# Patient Record
Sex: FEMALE | Race: WHITE | NOT HISPANIC OR LATINO | Employment: OTHER | URBAN - METROPOLITAN AREA
[De-identification: names, ages, dates, MRNs, and addresses within clinical notes are randomized per-mention and may not be internally consistent; named-entity substitution may affect disease eponyms.]

---

## 2020-03-18 ENCOUNTER — TELEPHONE (OUTPATIENT)
Dept: GASTROENTEROLOGY | Facility: CLINIC | Age: 70
End: 2020-03-18

## 2020-03-18 NOTE — TELEPHONE ENCOUNTER
Spoke with pt  She has decided to postpone her colonoscopy  She asks for a call to reschedule when we are able to do so

## 2020-04-29 ENCOUNTER — TELEPHONE (OUTPATIENT)
Dept: GASTROENTEROLOGY | Facility: CLINIC | Age: 70
End: 2020-04-29

## 2020-05-06 ENCOUNTER — TELEPHONE (OUTPATIENT)
Dept: GASTROENTEROLOGY | Facility: CLINIC | Age: 70
End: 2020-05-06

## 2020-05-06 ENCOUNTER — OFFICE VISIT (OUTPATIENT)
Dept: GASTROENTEROLOGY | Facility: CLINIC | Age: 70
End: 2020-05-06
Payer: COMMERCIAL

## 2020-05-06 VITALS
HEART RATE: 92 BPM | SYSTOLIC BLOOD PRESSURE: 120 MMHG | DIASTOLIC BLOOD PRESSURE: 82 MMHG | HEIGHT: 64 IN | WEIGHT: 151 LBS | BODY MASS INDEX: 25.78 KG/M2

## 2020-05-06 DIAGNOSIS — K21.9 GASTROESOPHAGEAL REFLUX DISEASE WITHOUT ESOPHAGITIS: ICD-10-CM

## 2020-05-06 DIAGNOSIS — R19.4 CHANGE IN BOWEL HABITS: Primary | ICD-10-CM

## 2020-05-06 DIAGNOSIS — Z12.11 COLON CANCER SCREENING: ICD-10-CM

## 2020-05-06 PROCEDURE — 99204 OFFICE O/P NEW MOD 45 MIN: CPT | Performed by: INTERNAL MEDICINE

## 2020-05-06 RX ORDER — ASCORBIC ACID 500 MG
500 TABLET ORAL DAILY
COMMUNITY

## 2020-05-06 RX ORDER — ALBUTEROL SULFATE 90 UG/1
2 AEROSOL, METERED RESPIRATORY (INHALATION) 4 TIMES DAILY
COMMUNITY

## 2020-05-06 RX ORDER — LOSARTAN POTASSIUM 50 MG/1
25 TABLET ORAL DAILY
COMMUNITY
Start: 2020-04-15 | End: 2022-03-09

## 2020-05-06 RX ORDER — IPRATROPIUM BROMIDE AND ALBUTEROL SULFATE .5; 3 MG/3ML; MG/3ML
3 SOLUTION RESPIRATORY (INHALATION) AS NEEDED
COMMUNITY

## 2020-05-06 RX ORDER — BUDESONIDE 0.5 MG/2ML
0.5 INHALANT ORAL DAILY PRN
COMMUNITY

## 2020-05-06 RX ORDER — MELATONIN
1000 DAILY
COMMUNITY

## 2020-05-06 RX ORDER — HYDROCHLOROTHIAZIDE 25 MG/1
25 TABLET ORAL DAILY
COMMUNITY
End: 2022-04-21 | Stop reason: SDUPTHER

## 2020-05-06 RX ORDER — VENLAFAXINE HYDROCHLORIDE 75 MG/1
75 CAPSULE, EXTENDED RELEASE ORAL DAILY
COMMUNITY
Start: 2020-04-07

## 2020-05-06 RX ORDER — LEVOTHYROXINE SODIUM 75 MCG
75 TABLET ORAL
COMMUNITY
Start: 2020-04-30

## 2020-05-06 RX ORDER — ROSUVASTATIN CALCIUM 20 MG/1
20 TABLET, COATED ORAL DAILY
COMMUNITY
Start: 2020-03-22

## 2020-05-06 RX ORDER — ALPRAZOLAM 0.25 MG/1
TABLET ORAL 4 TIMES DAILY PRN
COMMUNITY
End: 2022-03-09

## 2020-05-06 RX ORDER — SEMAGLUTIDE 1.34 MG/ML
INJECTION, SOLUTION SUBCUTANEOUS
COMMUNITY
Start: 2020-02-20

## 2020-05-06 RX ORDER — PHENOL 1.4 %
600 AEROSOL, SPRAY (ML) MUCOUS MEMBRANE 2 TIMES DAILY WITH MEALS
COMMUNITY

## 2020-05-06 RX ORDER — FLUTICASONE PROPIONATE 50 MCG
1 SPRAY, SUSPENSION (ML) NASAL DAILY
COMMUNITY
End: 2021-03-04

## 2020-05-06 RX ORDER — POTASSIUM CHLORIDE 20 MEQ/1
20 TABLET, EXTENDED RELEASE ORAL DAILY
COMMUNITY
Start: 2020-05-01

## 2020-05-06 RX ORDER — ASPIRIN 81 MG/1
81 TABLET ORAL DAILY
COMMUNITY
End: 2021-03-04 | Stop reason: ALTCHOICE

## 2020-05-06 RX ORDER — METFORMIN HYDROCHLORIDE 500 MG/1
1000 TABLET, EXTENDED RELEASE ORAL DAILY
COMMUNITY
Start: 2020-04-19

## 2020-05-06 RX ORDER — BENZONATATE 100 MG/1
100 CAPSULE ORAL 3 TIMES DAILY PRN
COMMUNITY

## 2020-05-06 RX ORDER — NITROGLYCERIN 0.4 MG/1
TABLET SUBLINGUAL
COMMUNITY
Start: 2020-03-29

## 2020-05-06 RX ORDER — AMLODIPINE BESYLATE 5 MG/1
5 TABLET ORAL DAILY
COMMUNITY
Start: 2020-04-07

## 2020-05-13 LAB
C DIFF TOX GENS STL QL NAA+PROBE: NEGATIVE
CALPROTECTIN STL-MCNT: 103 UG/G (ref 0–120)
FAT STL QL: NORMAL
G LAMBLIA AG STL QL IA: NEGATIVE
Lab: NORMAL
NEUTRAL FAT STL QL: NORMAL
WBC SPEC QL GRAM STN: NORMAL

## 2020-05-15 DIAGNOSIS — Z20.822 ENCOUNTER FOR LABORATORY TESTING FOR COVID-19 VIRUS: ICD-10-CM

## 2020-05-15 PROCEDURE — U0003 INFECTIOUS AGENT DETECTION BY NUCLEIC ACID (DNA OR RNA); SEVERE ACUTE RESPIRATORY SYNDROME CORONAVIRUS 2 (SARS-COV-2) (CORONAVIRUS DISEASE [COVID-19]), AMPLIFIED PROBE TECHNIQUE, MAKING USE OF HIGH THROUGHPUT TECHNOLOGIES AS DESCRIBED BY CMS-2020-01-R: HCPCS

## 2020-05-16 LAB — SARS-COV-2 RNA SPEC QL NAA+PROBE: NOT DETECTED

## 2020-05-21 ENCOUNTER — OFFICE VISIT (OUTPATIENT)
Dept: URGENT CARE | Facility: CLINIC | Age: 70
End: 2020-05-21
Payer: COMMERCIAL

## 2020-05-21 VITALS
RESPIRATION RATE: 16 BRPM | DIASTOLIC BLOOD PRESSURE: 80 MMHG | TEMPERATURE: 98.2 F | BODY MASS INDEX: 26.87 KG/M2 | HEIGHT: 64 IN | SYSTOLIC BLOOD PRESSURE: 138 MMHG | WEIGHT: 157.4 LBS | OXYGEN SATURATION: 95 % | HEART RATE: 93 BPM

## 2020-05-21 DIAGNOSIS — H66.002 NON-RECURRENT ACUTE SUPPURATIVE OTITIS MEDIA OF LEFT EAR WITHOUT SPONTANEOUS RUPTURE OF TYMPANIC MEMBRANE: Primary | ICD-10-CM

## 2020-05-21 PROCEDURE — 99213 OFFICE O/P EST LOW 20 MIN: CPT | Performed by: FAMILY MEDICINE

## 2020-05-21 RX ORDER — AMOXICILLIN 500 MG/1
500 CAPSULE ORAL EVERY 8 HOURS SCHEDULED
Qty: 30 CAPSULE | Refills: 0 | Status: SHIPPED | OUTPATIENT
Start: 2020-05-21 | End: 2020-05-31

## 2020-05-22 ENCOUNTER — ANESTHESIA EVENT (OUTPATIENT)
Dept: GASTROENTEROLOGY | Facility: AMBULATORY SURGERY CENTER | Age: 70
End: 2020-05-22

## 2020-05-22 ENCOUNTER — ANESTHESIA (OUTPATIENT)
Dept: GASTROENTEROLOGY | Facility: AMBULATORY SURGERY CENTER | Age: 70
End: 2020-05-22

## 2020-05-22 ENCOUNTER — TELEPHONE (OUTPATIENT)
Dept: GASTROENTEROLOGY | Facility: CLINIC | Age: 70
End: 2020-05-22

## 2020-05-22 ENCOUNTER — HOSPITAL ENCOUNTER (OUTPATIENT)
Dept: GASTROENTEROLOGY | Facility: AMBULATORY SURGERY CENTER | Age: 70
Discharge: HOME | End: 2020-05-22
Payer: COMMERCIAL

## 2020-05-22 VITALS
SYSTOLIC BLOOD PRESSURE: 151 MMHG | TEMPERATURE: 97.7 F | RESPIRATION RATE: 14 BRPM | HEART RATE: 75 BPM | OXYGEN SATURATION: 97 % | DIASTOLIC BLOOD PRESSURE: 76 MMHG | HEIGHT: 64 IN | BODY MASS INDEX: 25.95 KG/M2 | WEIGHT: 152 LBS

## 2020-05-22 DIAGNOSIS — K21.9 GASTROESOPHAGEAL REFLUX DISEASE WITHOUT ESOPHAGITIS: Primary | ICD-10-CM

## 2020-05-22 DIAGNOSIS — K21.9 GASTROESOPHAGEAL REFLUX DISEASE WITHOUT ESOPHAGITIS: ICD-10-CM

## 2020-05-22 PROCEDURE — 43450 DILATE ESOPHAGUS 1/MULT PASS: CPT | Performed by: INTERNAL MEDICINE

## 2020-05-22 PROCEDURE — 43239 EGD BIOPSY SINGLE/MULTIPLE: CPT | Performed by: INTERNAL MEDICINE

## 2020-05-22 RX ORDER — SODIUM CHLORIDE 9 MG/ML
50 INJECTION, SOLUTION INTRAVENOUS CONTINUOUS
Status: DISCONTINUED | OUTPATIENT
Start: 2020-05-22 | End: 2020-05-26 | Stop reason: HOSPADM

## 2020-05-22 RX ORDER — PROPOFOL 10 MG/ML
INJECTION, EMULSION INTRAVENOUS AS NEEDED
Status: DISCONTINUED | OUTPATIENT
Start: 2020-05-22 | End: 2020-05-22 | Stop reason: SURG

## 2020-05-22 RX ADMIN — SODIUM CHLORIDE: 9 INJECTION, SOLUTION INTRAVENOUS at 09:40

## 2020-05-22 RX ADMIN — SODIUM CHLORIDE 50 ML/HR: 9 INJECTION, SOLUTION INTRAVENOUS at 09:31

## 2020-05-22 RX ADMIN — PROPOFOL 300 MG: 10 INJECTION, EMULSION INTRAVENOUS at 09:54

## 2020-05-26 ENCOUNTER — TELEPHONE (OUTPATIENT)
Dept: GASTROENTEROLOGY | Facility: CLINIC | Age: 70
End: 2020-05-26

## 2020-07-01 ENCOUNTER — OFFICE VISIT (OUTPATIENT)
Dept: GASTROENTEROLOGY | Facility: CLINIC | Age: 70
End: 2020-07-01
Payer: COMMERCIAL

## 2020-07-01 VITALS
BODY MASS INDEX: 26.12 KG/M2 | TEMPERATURE: 98.1 F | SYSTOLIC BLOOD PRESSURE: 122 MMHG | WEIGHT: 153 LBS | HEIGHT: 64 IN | DIASTOLIC BLOOD PRESSURE: 88 MMHG

## 2020-07-01 DIAGNOSIS — Z12.11 COLON CANCER SCREENING: ICD-10-CM

## 2020-07-01 DIAGNOSIS — R19.4 CHANGE IN BOWEL HABITS: Primary | ICD-10-CM

## 2020-07-01 DIAGNOSIS — K62.5 RECTAL BLEEDING: ICD-10-CM

## 2020-07-01 DIAGNOSIS — K21.9 GASTROESOPHAGEAL REFLUX DISEASE WITHOUT ESOPHAGITIS: ICD-10-CM

## 2020-07-01 PROCEDURE — 99214 OFFICE O/P EST MOD 30 MIN: CPT | Performed by: INTERNAL MEDICINE

## 2020-07-01 NOTE — LETTER
July 1, 2020     Sohail Araya MD  [address]  [address]  [address] [place] [address]    Patient: Yola Rasheed   YOB: 1950   Date of Visit: 7/1/2020       Dear Dr Massey Scales:    Thank you for referring Yola Rasheed to me for evaluation  Below are my notes for this consultation  If you have questions, please do not hesitate to call me  I look forward to following your patient along with you  Sincerely,        Jerry Ponce MD        CC: No Recipients  Jerry Ponce MD  7/1/2020  2:11 PM  Sign at close encounter  [facility] - Outpatient Follow-up Note  Yola Rasheed 78 y o  female MRN: [id number]  Encounter: [id number]    ASSESSMENT AND PLAN:      1  Change in bowel habits  2  Rectal bleeding  Suspect irritable bowel syndrome exacerbated by change in diet with bleeding related to hemorrhoids  Stool studies negative  No improvement with fiber  Having 3 loose/soft bowel movements in the morning   - continue fiber supplementation  -  Colonoscopy at [facility] [place]  - T/C trial of cholestyramine    3  Gastroesophageal reflux disease without esophagitis  Much improved with Dexilant after EGD and Sharp dilatation  - continue Dexilant    4  Colon cancer screening  Last colonoscopy September 2011      Followup Appointment:  After colonoscopy  ______________________________________________________________________    Chief Complaint   Patient presents with    Follow-up     HPI:  The patient returns today for follow-up  She was evaluated in the spring related to a change in bowel habit which was probably related to alterations in her diet when she moved in with her son and daughter in law  She was started on fiber supplementation  Stool studies were negative  She reports she still moves her bowels about 2 or 3 times a day in the morning  They are loose    She is seeing significant amount of rectal bleeding about once every other month which she attributes to her hemorrhoids  She denies any abdominal pain  She denies any anal pain or burning  Her weight is stable  From a GERD standpoint she continues take Dexilant once a day  She had an EGD with biopsies and a Sharp dilatation which improved her symptoms  Historical Information   Past Medical History:   Diagnosis Date    Colon cancer screening     Last colonoscopy September 2011    Due for follow-up 2021    Diabetes mellitus ([address] )     Disease of thyroid gland     Fatty liver disease, nonalcoholic     GERD (gastroesophageal reflux disease)     Last EGD 2016    History of pulmonary aspergillosis 2012    Hx of migraine headaches     Hypertension     Reactive airway disease     Restless leg syndrome      Past Surgical History:   Procedure Laterality Date    CORNEA LACERATION REPAIR      TONSILLECTOMY      WISDOM TOOTH EXTRACTION       Social History     Substance and Sexual Activity   Alcohol Use Not on file     Social History     Substance and Sexual Activity   Drug Use Not Currently     Social History     Tobacco Use   Smoking Status Former Smoker   Smokeless Tobacco Never Used     Family History   Problem Relation Age of Onset    Heart disease Mother     Heart disease Father     Colon polyps Neg Hx     Colon cancer Neg Hx          Current Outpatient Medications:     albuterol (Proventil HFA) 90 mcg/act inhaler    ALPRAZolam (XANAX) 0 25 mg tablet    amLODIPine (NORVASC) 5 mg tablet    ascorbic acid (VITAMIN C) 500 mg tablet    aspirin (ECOTRIN LOW STRENGTH) 81 mg EC tablet    beclomethasone (QVAR) 40 MCG/ACT inhaler    benzonatate (TESSALON PERLES) 100 mg capsule    budesonide (PULMICORT) 0 5 mg/2 mL nebulizer solution    calcium carbonate (OS-BRITTA) 600 MG tablet    cholecalciferol (VITAMIN D3) 1,000 units tablet    DEXILANT 60 MG capsule    fluticasone (FLONASE) 50 mcg/act nasal spray    GUAIFENESIN 1200 PO    hydrochlorothiazide (HYDRODIURIL) 25 mg tablet   ipratropium-albuterol, FOR EMS ONLY, (DUO-NEB) 0 5-2 5 mg/3 mL nebulizer solution    losartan (COZAAR) 50 mg tablet    metFORMIN (GLUCOPHAGE-XR) 500 mg 24 hr tablet    nitroglycerin (NITROSTAT) 0 4 mg SL tablet    OZEMPIC, 0 25 OR 0 5 MG/DOSE, 2 MG/1 5ML SOPN    potassium chloride (K-DUR,KLOR-CON) 20 mEq tablet    rosuvastatin (CRESTOR) 20 MG tablet    SYNTHROID 75 MCG tablet    venlafaxine (EFFEXOR-XR) 75 mg 24 hr capsule  Allergies   Allergen Reactions    Epinephrine Shortness Of Breath and Palpitations    Irbesartan-Hydrochlorothiazide      Possible lichen planus    Lisinopril      Lichen planus    Tizanidine      Altered mental status     Reviewed medications and allergies and updated as indicated    PHYSICAL EXAM:    Blood pressure 122/88, temperature 98 1 °F (36 7 °C), height 5' 4" (1 626 m), weight 69 4 kg (153 lb)  Body mass index is 26 26 kg/m²  General Appearance: NAD, cooperative, alert  Eyes: Anicteric, PERRLA, EOMI  ENT:  Normocephalic, atraumatic, normal mucosa  Neck:  Supple, symmetrical, trachea midline  Resp:  Clear to auscultation bilaterally; no rales, rhonchi or wheezing; respirations unlabored   CV:  S1 S2, Regular rate and rhythm; no murmur, rub, or gallop  GI:  Soft, non-tender, non-distended; normal bowel sounds; no masses, no organomegaly   Rectal: Deferred  Musculoskeletal: No cyanosis, clubbing or edema  Normal ROM  Skin:  No jaundice, rashes, or lesions   Heme/Lymph: No palpable cervical lymphadenopathy  Psych: Normal affect, good eye contact  Neuro: No gross deficits, AAOx3    Lab Results:   None recently    Radiology Results:   No results found

## 2020-07-01 NOTE — PROGRESS NOTES
[address] [facility] - Outpatient Follow-up Note  Hanna Me 78 y o  female MRN: [id number]  Encounter: [id number]    ASSESSMENT AND PLAN:      1  Change in bowel habits  2  Rectal bleeding  Suspect irritable bowel syndrome exacerbated by change in diet with bleeding related to hemorrhoids  Stool studies negative  No improvement with fiber  Having 3 loose/soft bowel movements in the morning   - continue fiber supplementation  -  Colonoscopy at [facility]  - T/C trial of cholestyramine    3  Gastroesophageal reflux disease without esophagitis  Much improved with Dexilant after EGD and Sharp dilatation  - continue Dexilant    4  Colon cancer screening  Last colonoscopy September 2011      Followup Appointment:  After colonoscopy  ______________________________________________________________________    Chief Complaint   Patient presents with    Follow-up     HPI:  The patient returns today for follow-up  She was evaluated in the spring related to a change in bowel habit which was probably related to alterations in her diet when she moved in with her son and daughter in law  She was started on fiber supplementation  Stool studies were negative  She reports she still moves her bowels about 2 or 3 times a day in the morning  They are loose  She is seeing significant amount of rectal bleeding about once every other month which she attributes to her hemorrhoids  She denies any abdominal pain  She denies any anal pain or burning  Her weight is stable  From a GERD standpoint she continues take Dexilant once a day  She had an EGD with biopsies and a Sharp dilatation which improved her symptoms  Historical Information   Past Medical History:   Diagnosis Date    Colon cancer screening     Last colonoscopy September 2011    Due for follow-up 2021    Diabetes mellitus ([address] )     Disease of thyroid gland     Fatty liver disease, nonalcoholic     GERD (gastroesophageal reflux disease)     Last EGD 2016    History of pulmonary aspergillosis 2012    Hx of migraine headaches     Hypertension     Reactive airway disease     Restless leg syndrome      Past Surgical History:   Procedure Laterality Date    CORNEA LACERATION REPAIR      TONSILLECTOMY      WISDOM TOOTH EXTRACTION       Social History     Substance and Sexual Activity   Alcohol Use Not on file     Social History     Substance and Sexual Activity   Drug Use Not Currently     Social History     Tobacco Use   Smoking Status Former Smoker   Smokeless Tobacco Never Used     Family History   Problem Relation Age of Onset    Heart disease Mother     Heart disease Father     Colon polyps Neg Hx     Colon cancer Neg Hx          Current Outpatient Medications:     albuterol (Proventil HFA) 90 mcg/act inhaler    ALPRAZolam (XANAX) 0 25 mg tablet    amLODIPine (NORVASC) 5 mg tablet    ascorbic acid (VITAMIN C) 500 mg tablet    aspirin (ECOTRIN LOW STRENGTH) 81 mg EC tablet    beclomethasone (QVAR) 40 MCG/ACT inhaler    benzonatate (TESSALON PERLES) 100 mg capsule    budesonide (PULMICORT) 0 5 mg/2 mL nebulizer solution    calcium carbonate (OS-BRITTA) 600 MG tablet    cholecalciferol (VITAMIN D3) 1,000 units tablet    DEXILANT 60 MG capsule    fluticasone (FLONASE) 50 mcg/act nasal spray    GUAIFENESIN 1200 PO    hydrochlorothiazide (HYDRODIURIL) 25 mg tablet    ipratropium-albuterol, FOR EMS ONLY, (DUO-NEB) 0 5-2 5 mg/3 mL nebulizer solution    losartan (COZAAR) 50 mg tablet    metFORMIN (GLUCOPHAGE-XR) 500 mg 24 hr tablet    nitroglycerin (NITROSTAT) 0 4 mg SL tablet    OZEMPIC, 0 25 OR 0 5 MG/DOSE, 2 MG/1 5ML SOPN    potassium chloride (K-DUR,KLOR-CON) 20 mEq tablet    rosuvastatin (CRESTOR) 20 MG tablet    SYNTHROID 75 MCG tablet    venlafaxine (EFFEXOR-XR) 75 mg 24 hr capsule  Allergies   Allergen Reactions    Epinephrine Shortness Of Breath and Palpitations    Irbesartan-Hydrochlorothiazide Possible lichen planus    Lisinopril      Lichen planus    Tizanidine      Altered mental status     Reviewed medications and allergies and updated as indicated    PHYSICAL EXAM:    Blood pressure 122/88, temperature 98 1 °F (36 7 °C), height 5' 4" (1 626 m), weight 69 4 kg (153 lb)  Body mass index is 26 26 kg/m²  General Appearance: NAD, cooperative, alert  Eyes: Anicteric, PERRLA, EOMI  ENT:  Normocephalic, atraumatic, normal mucosa  Neck:  Supple, symmetrical, trachea midline  Resp:  Clear to auscultation bilaterally; no rales, rhonchi or wheezing; respirations unlabored   CV:  S1 S2, Regular rate and rhythm; no murmur, rub, or gallop  GI:  Soft, non-tender, non-distended; normal bowel sounds; no masses, no organomegaly   Rectal: Deferred  Musculoskeletal: No cyanosis, clubbing or edema  Normal ROM  Skin:  No jaundice, rashes, or lesions   Heme/Lymph: No palpable cervical lymphadenopathy  Psych: Normal affect, good eye contact  Neuro: No gross deficits, AAOx3    Lab Results:   None recently    Radiology Results:   No results found

## 2020-07-21 DIAGNOSIS — Z11.59 SCREENING FOR VIRAL DISEASE: ICD-10-CM

## 2020-07-21 DIAGNOSIS — Z12.11 SCREENING FOR COLON CANCER: ICD-10-CM

## 2020-07-21 PROCEDURE — U0003 INFECTIOUS AGENT DETECTION BY NUCLEIC ACID (DNA OR RNA); SEVERE ACUTE RESPIRATORY SYNDROME CORONAVIRUS 2 (SARS-COV-2) (CORONAVIRUS DISEASE [COVID-19]), AMPLIFIED PROBE TECHNIQUE, MAKING USE OF HIGH THROUGHPUT TECHNOLOGIES AS DESCRIBED BY CMS-2020-01-R: HCPCS

## 2020-07-22 LAB — SARS-COV-2 RNA SPEC QL NAA+PROBE: NOT DETECTED

## 2020-07-24 ENCOUNTER — CLINICAL SUPPORT (OUTPATIENT)
Dept: GASTROENTEROLOGY | Facility: CLINIC | Age: 70
End: 2020-07-24

## 2020-07-24 VITALS — WEIGHT: 153 LBS | BODY MASS INDEX: 26.12 KG/M2 | HEIGHT: 64 IN

## 2020-07-24 DIAGNOSIS — R19.4 CHANGE IN BOWEL HABIT: Primary | ICD-10-CM

## 2020-07-24 NOTE — PROGRESS NOTES
Colon phone prep completed; meds/allergires reviewed; suprep instructions reviewed and emailed to pt; rx forwarded to provider for approval; covid neg; c diff neg

## 2020-07-31 ENCOUNTER — ANESTHESIA EVENT (OUTPATIENT)
Dept: GASTROENTEROLOGY | Facility: AMBULATORY SURGERY CENTER | Age: 70
End: 2020-07-31

## 2020-07-31 ENCOUNTER — ANESTHESIA (OUTPATIENT)
Dept: GASTROENTEROLOGY | Facility: AMBULATORY SURGERY CENTER | Age: 70
End: 2020-07-31

## 2020-07-31 ENCOUNTER — HOSPITAL ENCOUNTER (OUTPATIENT)
Dept: GASTROENTEROLOGY | Facility: AMBULATORY SURGERY CENTER | Age: 70
Discharge: HOME | End: 2020-07-31
Payer: COMMERCIAL

## 2020-07-31 VITALS
RESPIRATION RATE: 21 BRPM | DIASTOLIC BLOOD PRESSURE: 63 MMHG | HEART RATE: 75 BPM | TEMPERATURE: 97.1 F | SYSTOLIC BLOOD PRESSURE: 145 MMHG | OXYGEN SATURATION: 97 %

## 2020-07-31 DIAGNOSIS — R19.4 CHANGE IN BOWEL HABITS: Primary | ICD-10-CM

## 2020-07-31 DIAGNOSIS — Z12.11 SCREENING FOR COLON CANCER: ICD-10-CM

## 2020-07-31 PROCEDURE — 88305 TISSUE EXAM BY PATHOLOGIST: CPT | Performed by: PATHOLOGY

## 2020-07-31 PROCEDURE — 45380 COLONOSCOPY AND BIOPSY: CPT | Performed by: INTERNAL MEDICINE

## 2020-07-31 RX ORDER — SODIUM CHLORIDE 9 MG/ML
75 INJECTION, SOLUTION INTRAVENOUS CONTINUOUS
Status: DISCONTINUED | OUTPATIENT
Start: 2020-07-31 | End: 2020-08-04 | Stop reason: HOSPADM

## 2020-07-31 RX ORDER — PROPOFOL 10 MG/ML
INJECTION, EMULSION INTRAVENOUS AS NEEDED
Status: DISCONTINUED | OUTPATIENT
Start: 2020-07-31 | End: 2020-07-31 | Stop reason: SURG

## 2020-07-31 RX ADMIN — SODIUM CHLORIDE 75 ML/HR: 9 INJECTION, SOLUTION INTRAVENOUS at 09:52

## 2020-07-31 RX ADMIN — PROPOFOL 60 MG: 10 INJECTION, EMULSION INTRAVENOUS at 10:05

## 2020-07-31 RX ADMIN — PROPOFOL 50 MG: 10 INJECTION, EMULSION INTRAVENOUS at 10:08

## 2020-07-31 RX ADMIN — PROPOFOL 20 MG: 10 INJECTION, EMULSION INTRAVENOUS at 10:11

## 2020-07-31 RX ADMIN — PROPOFOL 70 MG: 10 INJECTION, EMULSION INTRAVENOUS at 10:01

## 2020-07-31 NOTE — ANESTHESIA PREPROCEDURE EVALUATION
Review of Systems/Medical History  Patient summary reviewed  Chart reviewed      Cardiovascular  Hypertension ,    Pulmonary       GI/Hepatic    GERD , Liver disease ,             Endo/Other  Diabetes , History of thyroid disease ,      GYN       Hematology   Musculoskeletal       Neurology   Psychology           Physical Exam    Airway    Mallampati score: II         Dental   No notable dental hx     Cardiovascular  Rhythm: regular, Rate: normal,     Pulmonary      Other Findings        Anesthesia Plan  ASA Score- 3     Anesthesia Type- IV sedation with anesthesia with ASA Monitors  Additional Monitors:   Airway Plan:         Plan Factors-    Induction- intravenous  Postoperative Plan-     Informed Consent- Anesthetic plan and risks discussed with patient

## 2020-07-31 NOTE — ANESTHESIA POSTPROCEDURE EVALUATION
Post-Op Assessment Note    CV Status:  Stable  Pain Score: 0    Pain management: adequate     Mental Status:  Alert and awake   Hydration Status:  Euvolemic and stable   PONV Controlled:  None   Airway Patency:  Patent   Post Op Vitals Reviewed: Yes      Staff: Anesthesiologist           BP      Temp     Pulse     Resp      SpO2

## 2020-07-31 NOTE — H&P
History and Physical - [facility]  Lidia Freeman 69 y o  female MRN: [id number]    HPI: Lidia Freeman is a 69y o  year old female who presents for change in bowel habits    REVIEW OF SYSTEMS: Per the HPI, and otherwise unremarkable  Historical Information   Past Medical History:   Diagnosis Date    Asthma     Colon cancer screening     Last colonoscopy September 2011    Due for follow-up 2021    Diabetes mellitus ([address] )     Disease of thyroid gland     Fatty liver disease, nonalcoholic     GERD (gastroesophageal reflux disease)     Last EGD 2016    History of pulmonary aspergillosis 2012    Hx of migraine headaches     pt does not have    Hyperlipidemia     Hypertension     Reactive airway disease     Restless leg syndrome      Past Surgical History:   Procedure Laterality Date    COLONOSCOPY      CORNEA LACERATION REPAIR      TONSILLECTOMY      WISDOM TOOTH EXTRACTION       Social History   Social History     Substance and Sexual Activity   Alcohol Use Yes    Frequency: 4 or more times a week    Drinks per session: 1 or 2     Social History     Substance and Sexual Activity   Drug Use Not Currently     Social History     Tobacco Use   Smoking Status Former Smoker   Smokeless Tobacco Never Used     Family History   Problem Relation Age of Onset    Heart disease Mother     Heart disease Father     Colon polyps Neg Hx     Colon cancer Neg Hx        Meds/Allergies       Current Outpatient Medications:     albuterol (Proventil HFA) 90 mcg/act inhaler    amLODIPine (NORVASC) 5 mg tablet    ascorbic acid (VITAMIN C) 500 mg tablet    beclomethasone (QVAR) 40 MCG/ACT inhaler    calcium carbonate (OS-BRITTA) 600 MG tablet    cholecalciferol (VITAMIN D3) 1,000 units tablet    DEXILANT 60 MG capsule    fluticasone (FLONASE) 50 mcg/act nasal spray    GUAIFENESIN 1200 PO    losartan (COZAAR) 50 mg tablet    metFORMIN (GLUCOPHAGE-XR) 500 mg 24 hr tablet    Na Sulfate-K Sulfate-Mg Sulf 17 5-3 13-1 6 GM/177ML SOLN    OZEMPIC, 0 25 OR 0 5 MG/DOSE, 2 MG/1 5ML SOPN    potassium chloride (K-DUR,KLOR-CON) 20 mEq tablet    rosuvastatin (CRESTOR) 20 MG tablet    SYNTHROID 75 MCG tablet    venlafaxine (EFFEXOR-XR) 75 mg 24 hr capsule    ALPRAZolam (XANAX) 0 25 mg tablet    aspirin (ECOTRIN LOW STRENGTH) 81 mg EC tablet    benzonatate (TESSALON PERLES) 100 mg capsule    budesonide (PULMICORT) 0 5 mg/2 mL nebulizer solution    hydrochlorothiazide (HYDRODIURIL) 25 mg tablet    ipratropium-albuterol, FOR EMS ONLY, (DUO-NEB) 0 5-2 5 mg/3 mL nebulizer solution    nitroglycerin (NITROSTAT) 0 4 mg SL tablet    Current Facility-Administered Medications:     sodium chloride 0 9 % infusion, 75 mL/hr, Intravenous, Continuous    Allergies   Allergen Reactions    Epinephrine Shortness Of Breath and Palpitations    Irbesartan-Hydrochlorothiazide      Possible lichen planus    Lisinopril      Lichen planus    Tizanidine      Altered mental status       Objective     /78   Pulse 81   Temp (!) 97 1 °F (36 2 °C) (Temporal)   Resp 18   LMP  (LMP Unknown)   SpO2 98%     PHYSICAL EXAM    Gen: NAD AAOx3  CV: S1S2 RRR no m/r/g  CHEST: Clear b/l no c/r/w  ABD: soft, +BS NT/ND  EXT: no edema    ASSESSMENT/PLAN:  This is a 69y o  year old female here for colonoscopy, and she is stable and optimized for her procedure

## 2020-07-31 NOTE — DISCHARGE INSTRUCTIONS
Colorectal Polyps   WHAT YOU NEED TO KNOW:   What are colorectal polyps? Colorectal polyps are small growths of tissue in the lining of the colon and rectum  Most polyps are hyperplastic polyps and are usually benign (noncancerous)  Certain types of polyps, called adenomatous polyps, may turn into cancer  What increases my risk of colorectal polyps? The exact cause of colorectal polyps is unknown  The following may increase your risk:  · Older age    · A diet of foods high in fat and low in fiber     · Family history of polyps    · Intestinal diseases, such as Crohn's disease or ulcerative colitis    · An unhealthy lifestyle, such as physical inactivity, smoking, or drinking alcohol    · Obesity  What are the signs and symptoms of colorectal polyps? · Blood in your bowel movement or bleeding from the rectum    · Change in bowel movement habits, such as diarrhea and constipation    · Abdominal pain  How are colorectal polyps diagnosed? You should have fecal blood screening once a year for colorectal disease if you are over 48years old  You should be screened earlier if you have an intestinal disease or a family history of polyps or colorectal cancer  During this screening, a sample of your bowel movement is checked for blood, which may be an early sign of colorectal polyps or cancer  You may also need any of the following tests:  · Digital rectal exam:  Your healthcare provider will examine your anus and use a finger to check your rectum for polyps  · Barium enema: A barium enema is an x-ray of the colon  A tube is put into your anus, and a liquid called barium is put through the tube  Barium is used so that caregivers can see your colon better on the x-ray film  · Virtual colonoscopy: This is a CT scan that takes pictures of the inside of your colon and rectum  A small, flexible tube is put into your rectum and air or carbon dioxide (gas) is used to expand your colon   This lets healthcare providers clearly see your colon and any polyps on a monitor  · Colonoscopy or sigmoidoscopy: These procedures help your healthcare provider see the inside of your colon using a flexible tube with a small light and camera on the end  During a sigmoidoscopy, your healthcare provider will only look at rectum and lower colon  During a colonoscopy, healthcare providers will look at the full length of your colon  Healthcare providers may remove a small amount of tissue from the colon for a biopsy  How are colorectal polyps treated? · Polyp removal:  Polyps may be removed during your sigmoidoscopy or colonoscopy  · Polypectomy: This is surgery to remove your polyps  You may need laparoscopic or open surgery, depending on the type, size, and number of polyps that you have  Laparoscopy is done by inserting a small, flexible scope into incisions made on your abdomen  Open surgery is done by making a larger incision on your abdomen   What are the risks of colorectal polyps? You may bleed during a colonoscopy procedure  Your bowel may be perforated (torn) when polyps are removed  This may lead to an open abdominal surgery  During surgery, you may bleed too much or get an infection  Adenomatous polyps that are not removed may turn into cancer and become more difficult to treat  Where can I find support and more information? · Lesly Snyder ([facility])  [address], [place] [address]  Phone: [phone]  Web Address: Star Nelson  [facility] gov  When should I contact my healthcare provider? · You have a fever  · You have chills, a cough, or feel weak and achy  · You have abdominal pain that does not go away or gets worse after you take medicine  · Your abdomen is swollen  · You are losing weight without trying  · You have questions or concerns about your condition or care  When should I seek immediate care or call 911? · You have sudden shortness of breath  · You have a fast heart rate, fast breathing, or are too dizzy to stand up  · You have severe abdominal pain  · You see blood in your bowel movement  CARE AGREEMENT:   You have the right to help plan your care  Learn about your health condition and how it may be treated  Discuss treatment options with your caregivers to decide what care you want to receive  You always have the right to refuse treatment  The above information is an  only  It is not intended as medical advice for individual conditions or treatments  Talk to your doctor, nurse or pharmacist before following any medical regimen to see if it is safe and effective for you  © 2017 [facility] [facility] Information is for End User's use only and may not be sold, redistributed or otherwise used for commercial purposes  All illustrations and images included in CareNotes® are the copyrighted property of [employer]  or Qaamr Wheeler   WHAT YOU NEED TO KNOW:   What is diverticulosis? Diverticulosis is a condition that causes small pockets called diverticula to form in your intestine  These pockets make it difficult for bowel movements to pass through your digestive system  What causes diverticulosis? Diverticula form when muscles have to work hard to move bowel movements through the intestine  The force causes bulges to form at weak areas in the intestine  This may happen if you eat foods that are low in fiber  Fiber helps give your bowel movements more bulk so they are larger and easier to move through your colon  The following may increase your risk of diverticulosis:  · A history of constipation    · Age 36 or older    · Obesity    · Lack of exercise  What are the signs and symptoms of diverticulosis? Diverticulosis usually does not cause any signs or symptoms   It may cause any of the following in some people:  · Pain or discomfort in your lower abdomen    · Abdominal bloating    · Constipation or diarrhea  How is diverticulosis diagnosed? Your healthcare provider will examine you and ask about your bowel movements, diet, and symptoms  He or she will also ask about any medical conditions you have or medicines you take  You may need any of the following:  · Blood tests  may be done to check for signs of inflammation  · A barium enema  is an x-ray of your colon that may show diverticula  A tube is put into your anus, and a liquid called barium is put through the tube  Barium is used so that healthcare providers can see your colon more clearly  · Flexible sigmoidoscopy  is a test to look for any changes in your lower intestines and rectum  It may also show the cause of any bleeding or pain  A soft, bendable tube with a light on the end will be put into your anus  It will then be moved forward into your intestine  · A colonoscopy  is used to look at your whole colon  A scope (long bendable tube with a light on the end) is used to take pictures  This test may show diverticula  · A CT scan , or CAT scan, may show diverticula  You may be given contrast liquid before the scan  Tell the healthcare provider if you have ever had an allergic reaction to contrast liquid  How is diverticulosis managed? The goal of treatment is to manage any symptoms you have and prevent other problems such as diverticulitis  Diverticulitis is swelling or infection of the diverticula  Your healthcare provider may recommend any of the following:  · Eat a variety of high-fiber foods  High-fiber foods help you have regular bowel movements  High-fiber foods include cooked beans, fruits, vegetables, and some cereals  Most adults need 25 to 35 grams of fiber each day  Your healthcare provider may recommend that you have more  Ask your healthcare provider how much fiber you need  Increase fiber slowly   You may have abdominal discomfort, bloating, and gas if you add fiber to your diet too quickly  You may need to take a fiber supplement if you are not getting enough fiber from food  · Medicines  to soften your bowel movements may be given  You may also need medicines to treat symptoms such as bloating and pain  · Drink liquids as directed  You may need to drink 2 to 3 liters (8 to 12 cups) of liquids every day  Ask your healthcare provider how much liquid to drink each day and which liquids are best for you  · Apply heat  on your abdomen for 20 to 30 minutes every 2 hours for as many days as directed  Heat helps decrease pain and muscle spasms  How can I help prevent diverticulitis or other symptoms? The following may help decrease your risk for diverticulitis or symptoms, such as bleeding  Talk to your provider about these or other things you can do to prevent problems that may occur with diverticulosis  · Exercise regularly  Ask your healthcare provider about the best exercise plan for you  Exercise can help you have regular bowel movements  Get 30 minutes of exercise on most days of the week  · Maintain a healthy weight  Ask your healthcare provider how much you should weigh  Ask him or her to help you create a weight loss plan if you are overweight  · Do not smoke  Nicotine and other chemicals in cigarettes increase your risk for diverticulitis  Ask your healthcare provider for information if you currently smoke and need help to quit  E-cigarettes or smokeless tobacco still contain nicotine  Talk to your healthcare provider before you use these products  · Ask your healthcare provider if it is safe to take NSAIDs  NSAIDs may increase your risk of diverticulitis  When should I seek immediate care? · You have severe pain on the left side of your lower abdomen  · Your bowel movements are bright or dark red  When should I contact my healthcare provider? · You have a fever and chills  · You feel dizzy or lightheaded       · You have nausea, or you are vomiting  · You have a change in your bowel movements  · You have questions or concerns about your condition or care  CARE AGREEMENT:   You have the right to help plan your care  Learn about your health condition and how it may be treated  Discuss treatment options with your caregivers to decide what care you want to receive  You always have the right to refuse treatment  The above information is an  only  It is not intended as medical advice for individual conditions or treatments  Talk to your doctor, nurse or pharmacist before following any medical regimen to see if it is safe and effective for you  © 2017 [employer]  Information is for End User's use only and may not be sold, redistributed or otherwise used for commercial purposes  All illustrations and images included in CareNotes® are the copyrighted property of A D A M , Inc  or Qamar Gould  Hemorrhoids   WHAT YOU NEED TO KNOW:   What are hemorrhoids? Hemorrhoids are swollen blood vessels inside your rectum (internal hemorrhoids) or on your anus (external hemorrhoids)  Sometimes a hemorrhoid may prolapse  This means it extends out of your anus  What increases my risk for hemorrhoids? · Pregnancy or obesity    · Straining or sitting for a long time during bowel movements    · Liver disease    · Weak muscles around the anus caused by older age, rectal surgery, or anal intercourse    · A lack of physical activity    · Chronic diarrhea or constipation    · A low-fiber diet  What are the signs and symptoms of hemorrhoids? · Pain or itching around your anus or inside your rectum    · Swelling or bumps around your anus    · Bright red blood in your bowel movement, on the toilet paper, or in the toilet bowl    · Tissue bulging out of your anus (prolapsed hemorrhoids)    · Incontinence (poor control over urine or bowel movements)  How are hemorrhoids diagnosed?   Your healthcare provider will ask about your symptoms, the foods you eat, and your bowel movements  He will examine your anus for external hemorrhoids  You may need the following:  · A digital rectal exam  is a test to check for hemorrhoids  Your healthcare provider will put a gloved finger inside your anus to feel for the hemorrhoids  · An anoscopy  is a test that uses a scope (small tube with a light and camera on the end) to look at your hemorrhoids  How are hemorrhoids treated? Treatment will depend on your symptoms  You may need any of the following:  · Medicines  can help decrease pain and swelling, and soften your bowel movement  The medicine may be a pill, pad, cream, or ointment  · Procedures  may be used to shrink or remove your hemorrhoid  Examples include rubber-band ligation, sclerotherapy, and photocoagulation  These procedures may be done in your healthcare provider's office  Ask your healthcare provider for more information about these procedures  · Surgery  may be needed to shrink or remove your hemorrhoids  How can I manage my symptoms? · Apply ice on your anus for 15 to 20 minutes every hour or as directed  Use an ice pack, or put crushed ice in a plastic bag  Cover it with a towel before you apply it to your anus  Ice helps prevent tissue damage and decreases swelling and pain  · Take a sitz bath  Fill a bathtub with 4 to 6 inches of warm water  You may also use a sitz bath pan that fits inside a toilet bowl  Sit in the sitz bath for 15 minutes  Do this 3 times a day, and after each bowel movement  The warm water can help decrease pain and swelling  · Keep your anal area clean  Gently wash the area with warm water daily  Soap may irritate the area  After a bowel movement, wipe with moist towelettes or wet toilet paper  Dry toilet paper can irritate the area  How can I help prevent hemorrhoids? · Do not strain to have a bowel movement  Do not sit on the toilet too long   These actions can increase pressure on the tissues in your rectum and anus  · Drink plenty of liquids  Liquids can help prevent constipation  Ask how much liquid to drink each day and which liquids are best for you  · Eat a variety of high-fiber foods  Examples include fruits, vegetables, and whole grains  Ask your healthcare provider how much fiber you need each day  You may need to take a fiber supplement  · Exercise as directed  Exercise, such as walking, may make it easier to have a bowel movement  Ask your healthcare provider to help you create an exercise plan  · Do not have anal sex  Anal sex can weaken the skin around your rectum and anus  · Avoid heavy lifting  This can cause straining and increase your risk for another hemorrhoid  When should I seek immediate care? · You have severe pain in your rectum or around your anus  · You have severe pain in your abdomen and you are vomiting  · You have bleeding from your anus that soaks through your underwear  When should I contact my healthcare provider? · You have frequent and painful bowel movements  · Your hemorrhoid looks or feels more swollen than usual      · You do not have a bowel movement for 2 days or more  · You see or feel tissue coming through your anus  · You have questions or concerns about your condition or care  CARE AGREEMENT:   You have the right to help plan your care  Learn about your health condition and how it may be treated  Discuss treatment options with your caregivers to decide what care you want to receive  You always have the right to refuse treatment  The above information is an  only  It is not intended as medical advice for individual conditions or treatments  Talk to your doctor, nurse or pharmacist before following any medical regimen to see if it is safe and effective for you    © 2017 [employer] Information is for End User's use only and may not be sold, redistributed or otherwise used for commercial purposes  All illustrations and images included in CareNotes® are the copyrighted property of A D A M , Inc  or Qamar Gould

## 2020-08-04 RX ORDER — CHOLESTYRAMINE 4 G/9G
1 POWDER, FOR SUSPENSION ORAL
Qty: 30 PACKET | Refills: 2 | Status: SHIPPED | OUTPATIENT
Start: 2020-08-04 | End: 2020-09-22 | Stop reason: ALTCHOICE

## 2020-08-04 NOTE — RESULT ENCOUNTER NOTE
Discussed with patient, 5 year colonoscopy recall, electronically prescribed Questran, keep upcoming office visit

## 2020-09-22 ENCOUNTER — OFFICE VISIT (OUTPATIENT)
Dept: GASTROENTEROLOGY | Facility: CLINIC | Age: 70
End: 2020-09-22
Payer: COMMERCIAL

## 2020-09-22 VITALS
DIASTOLIC BLOOD PRESSURE: 86 MMHG | HEART RATE: 95 BPM | BODY MASS INDEX: 26.46 KG/M2 | HEIGHT: 64 IN | WEIGHT: 155 LBS | SYSTOLIC BLOOD PRESSURE: 138 MMHG | TEMPERATURE: 96.8 F

## 2020-09-22 DIAGNOSIS — R19.4 CHANGE IN BOWEL HABITS: Primary | ICD-10-CM

## 2020-09-22 DIAGNOSIS — K21.9 GASTROESOPHAGEAL REFLUX DISEASE WITHOUT ESOPHAGITIS: ICD-10-CM

## 2020-09-22 DIAGNOSIS — Z86.010 PERSONAL HISTORY OF COLONIC POLYPS: ICD-10-CM

## 2020-09-22 PROCEDURE — 99213 OFFICE O/P EST LOW 20 MIN: CPT | Performed by: INTERNAL MEDICINE

## 2020-09-22 NOTE — PROGRESS NOTES
[id number] [facility] - Outpatient Follow-up Note  Charmaine Myers 69 y o  female MRN: [id number]  Encounter: [id number]    ASSESSMENT AND PLAN:      1  Change in bowel habits  Present for least 6 months  No etiology on stool studies or colonoscopy including biopsies for microscopic colitis  Symptoms persist despite Questran  Remaining differential diagnoses include medication induced (ppi or metformin), thyroid disease or IBS-D  I recommended decreasing the Dexilant 30 mg daily  Will obtain a thyroid panel  If labs normal and symptoms persist, will treat with Bentyl and if she remains symptomatic will discuss an alternate to metformin    - TSH, 3rd generation with Free T4 reflex  - C-reactive protein    2  Gastroesophageal reflux disease without esophagitis  Well controlled since EGD/dilatation, decreased Dexilant 30 mg daily and taper further as tolerated  - dexlansoprazole (Dexilant) 30 MG capsule; Take 1 capsule (30 mg total) by mouth daily  Dispense: 30 capsule; Refill: 0    3  Personal history of colonic polyps  Adenoma July 2020, 5 year recall  Followup Appointment:  2 months  ______________________________________________________________________    Chief Complaint   Patient presents with    Follow-up     Post Colonoscopy    Diarrhea     Sx continue     HPI:  The patient returns for follow-up on diarrhea  She was last seen at the time of a colonoscopy July 31st   This was negative for inflammatory bowel disease and biopsies were negative for microscopic colitis  I prescribed cholestyramine but she remains symptomatic with multiple loose, somewhat urgent stools each morning  She denies any postprandial or nocturnal symptoms  There is no rectal bleeding or other alarm symptoms  She becomes more symptomatic with dairy but symptoms occur even without dairy intake      Historical Information   Past Medical History:   Diagnosis Date    Asthma     Colon cancer screening Last colonoscopy September 2011    Due for follow-up 2021    Diabetes mellitus ([address] )     Disease of thyroid gland     Fatty liver disease, nonalcoholic     GERD (gastroesophageal reflux disease)     Last EGD 2016    History of pulmonary aspergillosis 2012    Hx of migraine headaches     pt does not have    Hyperlipidemia     Hypertension     Reactive airway disease     Restless leg syndrome      Past Surgical History:   Procedure Laterality Date    COLONOSCOPY      CORNEA LACERATION REPAIR      TONSILLECTOMY      WISDOM TOOTH EXTRACTION       Social History     Substance and Sexual Activity   Alcohol Use Yes    Frequency: 4 or more times a week    Drinks per session: 1 or 2     Social History     Substance and Sexual Activity   Drug Use Not Currently     Social History     Tobacco Use   Smoking Status Former Smoker   Smokeless Tobacco Never Used     Family History   Problem Relation Age of Onset    Heart disease Mother     Heart disease Father     Colon polyps Neg Hx     Colon cancer Neg Hx          Current Outpatient Medications:     albuterol (Proventil HFA) 90 mcg/act inhaler    ALPRAZolam (XANAX) 0 25 mg tablet    amLODIPine (NORVASC) 5 mg tablet    ascorbic acid (VITAMIN C) 500 mg tablet    beclomethasone (QVAR) 40 MCG/ACT inhaler    benzonatate (TESSALON PERLES) 100 mg capsule    budesonide (PULMICORT) 0 5 mg/2 mL nebulizer solution    calcium carbonate (OS-BRITTA) 600 MG tablet    cholecalciferol (VITAMIN D3) 1,000 units tablet    fluticasone (FLONASE) 50 mcg/act nasal spray    GUAIFENESIN 1200 PO    hydrochlorothiazide (HYDRODIURIL) 25 mg tablet    ipratropium-albuterol, FOR EMS ONLY, (DUO-NEB) 0 5-2 5 mg/3 mL nebulizer solution    losartan (COZAAR) 50 mg tablet    metFORMIN (GLUCOPHAGE-XR) 500 mg 24 hr tablet    nitroglycerin (NITROSTAT) 0 4 mg SL tablet    OZEMPIC, 0 25 OR 0 5 MG/DOSE, 2 MG/1 5ML SOPN    potassium chloride (K-DUR,KLOR-CON) 20 mEq tablet    rosuvastatin (CRESTOR) 20 MG tablet    SYNTHROID 75 MCG tablet    venlafaxine (EFFEXOR-XR) 75 mg 24 hr capsule    aspirin (ECOTRIN LOW STRENGTH) 81 mg EC tablet    dexlansoprazole (Dexilant) 30 MG capsule  Allergies   Allergen Reactions    Epinephrine Shortness Of Breath and Palpitations    Irbesartan-Hydrochlorothiazide      Possible lichen planus    Lisinopril      Lichen planus    Tizanidine      Altered mental status     Reviewed medications and allergies and updated as indicated    PHYSICAL EXAM:    Blood pressure 138/86, pulse 95, temperature (!) 96 8 °F (36 °C), height 5' 4" (1 626 m), weight 70 3 kg (155 lb)  Body mass index is 26 61 kg/m²  General Appearance: NAD, cooperative, alert  Eyes: Anicteric, PERRLA, EOMI  ENT:  Normocephalic, atraumatic, normal mucosa  Neck:  Supple, symmetrical, trachea midline  Resp:  Clear to auscultation bilaterally; no rales, rhonchi or wheezing; respirations unlabored   CV:  S1 S2, Regular rate and rhythm; no murmur, rub, or gallop  GI:  Soft, non-tender, non-distended; normal bowel sounds; no masses, no organomegaly   Rectal: Deferred  Musculoskeletal: No cyanosis, clubbing or edema  Normal ROM  Skin:  No jaundice, rashes, or lesions   Heme/Lymph: No palpable cervical lymphadenopathy  Psych: Normal affect, good eye contact  Neuro: No gross deficits, AAOx3    Lab Results:   No results found for: WBC, HGB, HCT, MCV, PLT  No results found for: NA, K, CL, CO2, ANIONGAP, BUN, CREATININE, GLUCOSE, GLUF, CALCIUM, CORRECTEDCA, AST, ALT, ALKPHOS, PROT, BILITOT, EGFR  No results found for: IRON, TIBC, FERRITIN  No results found for: LIPASE    Radiology Results:   No results found

## 2020-10-14 DIAGNOSIS — K21.9 GASTROESOPHAGEAL REFLUX DISEASE WITHOUT ESOPHAGITIS: ICD-10-CM

## 2020-10-15 ENCOUNTER — APPOINTMENT (OUTPATIENT)
Dept: RADIOLOGY | Facility: CLINIC | Age: 70
End: 2020-10-15
Payer: COMMERCIAL

## 2020-10-15 ENCOUNTER — OFFICE VISIT (OUTPATIENT)
Dept: URGENT CARE | Facility: CLINIC | Age: 70
End: 2020-10-15
Payer: COMMERCIAL

## 2020-10-15 VITALS — OXYGEN SATURATION: 94 % | TEMPERATURE: 98 F | HEART RATE: 73 BPM | RESPIRATION RATE: 20 BRPM

## 2020-10-15 DIAGNOSIS — Z11.59 SCREENING EXAMINATION FOR ARTHROPOD-BORNE VIRAL DISEASE: ICD-10-CM

## 2020-10-15 DIAGNOSIS — R05.9 COUGH: ICD-10-CM

## 2020-10-15 DIAGNOSIS — R05.9 COUGH: Primary | ICD-10-CM

## 2020-10-15 PROCEDURE — 71046 X-RAY EXAM CHEST 2 VIEWS: CPT

## 2020-10-15 PROCEDURE — 99213 OFFICE O/P EST LOW 20 MIN: CPT | Performed by: FAMILY MEDICINE

## 2020-10-15 PROCEDURE — 87635 SARS-COV-2 COVID-19 AMP PRB: CPT | Performed by: FAMILY MEDICINE

## 2020-10-15 RX ORDER — AZITHROMYCIN 250 MG/1
TABLET, FILM COATED ORAL
Qty: 6 TABLET | Refills: 0 | Status: SHIPPED | OUTPATIENT
Start: 2020-10-15 | End: 2020-10-19

## 2020-10-16 LAB — SARS-COV-2 RNA RESP QL NAA+PROBE: NEGATIVE

## 2020-11-25 LAB
CRP SERPL-MCNC: <1 MG/L (ref 0–10)
TSH SERPL DL<=0.005 MIU/L-ACNC: 0.78 UIU/ML (ref 0.45–4.5)

## 2021-01-21 LAB — HBA1C MFR BLD HPLC: 7.2 %

## 2021-02-13 ENCOUNTER — OFFICE VISIT (OUTPATIENT)
Dept: URGENT CARE | Facility: CLINIC | Age: 71
End: 2021-02-13
Payer: COMMERCIAL

## 2021-02-13 VITALS
TEMPERATURE: 99.8 F | SYSTOLIC BLOOD PRESSURE: 118 MMHG | WEIGHT: 118 LBS | HEART RATE: 93 BPM | OXYGEN SATURATION: 97 % | DIASTOLIC BLOOD PRESSURE: 73 MMHG | HEIGHT: 64 IN | BODY MASS INDEX: 20.14 KG/M2 | RESPIRATION RATE: 18 BRPM

## 2021-02-13 DIAGNOSIS — N30.01 ACUTE CYSTITIS WITH HEMATURIA: Primary | ICD-10-CM

## 2021-02-13 LAB
SL AMB  POCT GLUCOSE, UA: NEGATIVE
SL AMB LEUKOCYTE ESTERASE,UA: ABNORMAL
SL AMB POCT BILIRUBIN,UA: ABNORMAL
SL AMB POCT BLOOD,UA: ABNORMAL
SL AMB POCT CLARITY,UA: ABNORMAL
SL AMB POCT COLOR,UA: YELLOW
SL AMB POCT KETONES,UA: 80
SL AMB POCT NITRITE,UA: NEGATIVE
SL AMB POCT PH,UA: 5
SL AMB POCT SPECIFIC GRAVITY,UA: 1.02
SL AMB POCT URINE PROTEIN: 30
SL AMB POCT UROBILINOGEN: 0.2

## 2021-02-13 PROCEDURE — 81002 URINALYSIS NONAUTO W/O SCOPE: CPT | Performed by: PHYSICIAN ASSISTANT

## 2021-02-13 PROCEDURE — 99213 OFFICE O/P EST LOW 20 MIN: CPT | Performed by: PHYSICIAN ASSISTANT

## 2021-02-13 RX ORDER — SULFAMETHOXAZOLE AND TRIMETHOPRIM 800; 160 MG/1; MG/1
1 TABLET ORAL EVERY 12 HOURS SCHEDULED
Qty: 6 TABLET | Refills: 0 | Status: SHIPPED | OUTPATIENT
Start: 2021-02-13 | End: 2021-02-16

## 2021-02-13 NOTE — PROGRESS NOTES
[facility]        NAME: Manju Jain is a 69 y o  female  : 1950    MRN: [id number]  DATE: 2021  TIME: 5:25 PM    Assessment and Plan   Acute cystitis with hematuria [N30 01]  1  Acute cystitis with hematuria  POCT urine dip    sulfamethoxazole-trimethoprim (BACTRIM DS) 800-160 mg per tablet         Patient Instructions     Urine dip positive for RBCs & leuks  Take antibiotic as directed with food  Recommend probiotics for side effects  Continue with over-the-counter symptom relief  Monitor for worsening symptoms - go to ER if worse  Follow up with PCP in 3-5 days  Proceed to  ER if symptoms worsen  Chief Complaint     Chief Complaint   Patient presents with    Urinary Tract Infection     Pt  here complaining of poss UTI  Reports lower back pain and burning when urinating  Symptoms started yesterday  History of Present Illness       Patient presents with complaint of dysuria since yesterday  She reports associated lower back pain, decreased appetite, mild nausea, and feeling weak  She denies gross hematuria and reports only mildly increased urinary urgency and frequency  Pt reports having a Tmax of 100 7 at home but denies trying any palliative measures  She denies having a UTI for many years  Pt denies known antibiotic allergies and recent antibiotic use  Review of Systems   Review of Systems   Constitutional: Positive for appetite change  Negative for chills and fever  HENT: Negative for ear pain and sore throat  Eyes: Negative for pain and visual disturbance  Respiratory: Negative for cough and shortness of breath  Cardiovascular: Negative for chest pain and palpitations  Gastrointestinal: Negative for abdominal pain and vomiting  Genitourinary: Positive for dysuria  Negative for flank pain, hematuria and urgency  Musculoskeletal: Positive for back pain  Negative for arthralgias  Skin: Negative for color change and rash     Neurological: Negative for seizures and syncope  All other systems reviewed and are negative          Current Medications       Current Outpatient Medications:     albuterol (Proventil HFA) 90 mcg/act inhaler, Inhale 2 puffs 4 (four) times a day, Disp: , Rfl:     ALPRAZolam (XANAX) 0 25 mg tablet, Take by mouth 4 (four) times a day as needed for anxiety, Disp: , Rfl:     amLODIPine (NORVASC) 5 mg tablet, , Disp: , Rfl:     ascorbic acid (VITAMIN C) 500 mg tablet, Take 500 mg by mouth daily, Disp: , Rfl:     aspirin (ECOTRIN LOW STRENGTH) 81 mg EC tablet, Take 81 mg by mouth daily, Disp: , Rfl:     beclomethasone (QVAR) 40 MCG/ACT inhaler, Inhale 2 puffs 2 (two) times a day Rinse mouth after use , Disp: , Rfl:     benzonatate (TESSALON PERLES) 100 mg capsule, Take 100 mg by mouth 3 (three) times a day as needed for cough, Disp: , Rfl:     budesonide (PULMICORT) 0 5 mg/2 mL nebulizer solution, Take 0 5 mg by nebulization 2 (two) times a day Rinse mouth after use , Disp: , Rfl:     calcium carbonate (OS-BRITTA) 600 MG tablet, Take 600 mg by mouth 2 (two) times a day with meals, Disp: , Rfl:     cholecalciferol (VITAMIN D3) 1,000 units tablet, Take 1,000 Units by mouth daily, Disp: , Rfl:     dexlansoprazole (Dexilant) 30 MG capsule, Take 1 capsule (30 mg total) by mouth daily, Disp: 30 capsule, Rfl: 6    fluticasone (FLONASE) 50 mcg/act nasal spray, 1 spray into each nostril daily, Disp: , Rfl:     GUAIFENESIN 1200 PO, Take 1,500 mg by mouth 2 (two) times a day, Disp: , Rfl:     hydrochlorothiazide (HYDRODIURIL) 25 mg tablet, Take 25 mg by mouth daily, Disp: , Rfl:     ipratropium-albuterol, FOR EMS ONLY, (DUO-NEB) 0 5-2 5 mg/3 mL nebulizer solution, 3 mL by Does not apply route 4 (four) times a day, Disp: , Rfl:     losartan (COZAAR) 50 mg tablet, Take 25 mg by mouth daily, Disp: , Rfl:     metFORMIN (GLUCOPHAGE-XR) 500 mg 24 hr tablet, Take 1,000 mg by mouth daily, Disp: , Rfl:     nitroglycerin (NITROSTAT) 0 4 mg SL tablet, , Disp: , Rfl:     OZEMPIC, 0 25 OR 0 5 MG/DOSE, 2 MG/1 5ML SOPN, , Disp: , Rfl:     potassium chloride (K-DUR,KLOR-CON) 20 mEq tablet, , Disp: , Rfl:     rosuvastatin (CRESTOR) 20 MG tablet, , Disp: , Rfl:     sulfamethoxazole-trimethoprim (BACTRIM DS) 800-160 mg per tablet, Take 1 tablet by mouth every 12 (twelve) hours for 3 days, Disp: 6 tablet, Rfl: 0    SYNTHROID 75 MCG tablet, , Disp: , Rfl:     venlafaxine (EFFEXOR-XR) 75 mg 24 hr capsule, , Disp: , Rfl:     Current Allergies     Allergies as of 02/13/2021 - Reviewed 10/15/2020   Allergen Reaction Noted    Epinephrine Shortness Of Breath and Palpitations 05/06/2020    Irbesartan-hydrochlorothiazide  05/06/2020    Lisinopril  05/06/2020    Tizanidine  05/06/2020            The following portions of the patient's history were reviewed and updated as appropriate: allergies, current medications, past family history, past medical history, past social history, past surgical history and problem list      Past Medical History:   Diagnosis Date    Asthma     Colon cancer screening     Last colonoscopy September 2011  Due for follow-up 2021    Diabetes mellitus ([address] )     Disease of thyroid gland     Fatty liver disease, nonalcoholic     GERD (gastroesophageal reflux disease)     Last EGD 2016    History of pulmonary aspergillosis 2012    Hx of migraine headaches     pt does not have    Hyperlipidemia     Hypertension     Reactive airway disease     Restless leg syndrome        Past Surgical History:   Procedure Laterality Date    COLONOSCOPY      CORNEA LACERATION REPAIR      TONSILLECTOMY      WISDOM TOOTH EXTRACTION         Family History   Problem Relation Age of Onset    Heart disease Mother     Heart disease Father     Colon polyps Neg Hx     Colon cancer Neg Hx          Medications have been verified          Objective   /73 (BP Location: Right arm, Patient Position: Sitting, Cuff Size: Standard)   Pulse 93   Temp 99 8 °F (37 7 °C)   Resp 18   Ht 5' 4" (1 626 m)   Wt 53 5 kg (118 lb)   LMP  (LMP Unknown)   SpO2 97%   BMI 20 25 kg/m²   No LMP recorded (lmp unknown)  Patient is postmenopausal        Physical Exam     Physical Exam  Vitals signs and nursing note reviewed  Constitutional:       General: She is not in acute distress  Appearance: Normal appearance  She is well-developed  She is not ill-appearing or diaphoretic  HENT:      Head: Normocephalic and atraumatic  Eyes:      Conjunctiva/sclera: Conjunctivae normal       Pupils: Pupils are equal, round, and reactive to light  Neck:      Musculoskeletal: Normal range of motion and neck supple  Cardiovascular:      Rate and Rhythm: Normal rate and regular rhythm  Heart sounds: Normal heart sounds  Pulmonary:      Effort: Pulmonary effort is normal  No respiratory distress  Breath sounds: Normal breath sounds  No stridor  No wheezing, rhonchi or rales  Abdominal:      Tenderness: There is no right CVA tenderness or left CVA tenderness  Lymphadenopathy:      Cervical: No cervical adenopathy  Skin:     General: Skin is warm and dry  Capillary Refill: Capillary refill takes less than 2 seconds  Findings: No rash  Neurological:      Mental Status: She is alert and oriented to person, place, and time  Cranial Nerves: No cranial nerve deficit  Sensory: No sensory deficit  Psychiatric:         Behavior: Behavior normal          Thought Content:  Thought content normal

## 2021-03-04 ENCOUNTER — OFFICE VISIT (OUTPATIENT)
Dept: GASTROENTEROLOGY | Facility: CLINIC | Age: 71
End: 2021-03-04
Payer: COMMERCIAL

## 2021-03-04 VITALS
SYSTOLIC BLOOD PRESSURE: 108 MMHG | HEIGHT: 64 IN | HEART RATE: 82 BPM | WEIGHT: 155 LBS | BODY MASS INDEX: 26.46 KG/M2 | DIASTOLIC BLOOD PRESSURE: 70 MMHG

## 2021-03-04 DIAGNOSIS — K59.1 FUNCTIONAL DIARRHEA: Primary | ICD-10-CM

## 2021-03-04 PROCEDURE — 99213 OFFICE O/P EST LOW 20 MIN: CPT | Performed by: INTERNAL MEDICINE

## 2021-07-18 ENCOUNTER — OFFICE VISIT (OUTPATIENT)
Dept: URGENT CARE | Facility: CLINIC | Age: 71
End: 2021-07-18
Payer: COMMERCIAL

## 2021-07-18 VITALS
WEIGHT: 156.2 LBS | HEIGHT: 64 IN | SYSTOLIC BLOOD PRESSURE: 148 MMHG | OXYGEN SATURATION: 95 % | BODY MASS INDEX: 26.67 KG/M2 | DIASTOLIC BLOOD PRESSURE: 73 MMHG | HEART RATE: 77 BPM | RESPIRATION RATE: 16 BRPM

## 2021-07-18 DIAGNOSIS — L08.9 WOUND INFECTION: Primary | ICD-10-CM

## 2021-07-18 DIAGNOSIS — T14.8XXA WOUND INFECTION: Primary | ICD-10-CM

## 2021-07-18 PROCEDURE — 99213 OFFICE O/P EST LOW 20 MIN: CPT | Performed by: PHYSICIAN ASSISTANT

## 2021-07-18 RX ORDER — SULFAMETHOXAZOLE AND TRIMETHOPRIM 800; 160 MG/1; MG/1
1 TABLET ORAL EVERY 12 HOURS SCHEDULED
Qty: 10 TABLET | Refills: 0 | Status: SHIPPED | OUTPATIENT
Start: 2021-07-18 | End: 2021-07-23

## 2021-07-18 NOTE — PATIENT INSTRUCTIONS
Bactrim as directed   Soap and water to the area along with antibiotic ointment  Follow-up with PCP for wound check in 2 days   Anything changes or worsens go the ER

## 2021-07-18 NOTE — PROGRESS NOTES
NAME: Sarthak Read is a 69 y o  female  : 1950    MRN: [id number]      Assessment and Plan   Wound infection [T14  8XXA, L08 9]  1  Wound infection  sulfamethoxazole-trimethoprim (BACTRIM DS) 800-160 mg per tablet         Will cover with Bactrim but discussed that she needs to follow up with her family doctor for a wound check in 1-2 days  If symptoms worsen before then or developed any fevers or chills go to the ER  She acknowledges    Patient Instructions     Patient Instructions    Bactrim as directed   Soap and water to the area along with antibiotic ointment  Follow-up with PCP for wound check in 2 days   Anything changes or worsens go the ER    Proceed to ER if symptoms worsen  Chief Complaint     Chief Complaint   Patient presents with    Wound Check     Tuesday pt dropped a kitchen knife, point first, onto dorsal R foot  Seen at [address] [place] [address], sutures applied  Wound:  sutures intact, riley-wound red, swollen  No systemic s/s per pt at this time  History of Present Illness    Patient with history of diabetes and hypertension presents complaining of possible wound infection  Reports on Tuesday dropped a knife on the top of her right foot and had it sutured at [facility]  Reports was not given any antibiotics and started to notice the last 24 hours that it has become red with a small amount of purulent drainage  Denies any fevers, chills, numbness or tingling to the toes  Has not tried anything for it  Review of Systems   Review of Systems   Constitutional: Negative for chills and fever  Cardiovascular: Negative for chest pain and palpitations  Gastrointestinal: Negative for diarrhea, nausea and vomiting  Skin: Positive for wound  Neurological: Negative for weakness and numbness           Current Medications       Current Outpatient Medications:     albuterol (Proventil HFA) 90 mcg/act inhaler, Inhale 2 puffs 4 (four) times a day, Disp: , Rfl:     ALPRAZolam (XANAX) 0 25 mg tablet, Take by mouth 4 (four) times a day as needed for anxiety, Disp: , Rfl:     amLODIPine (NORVASC) 5 mg tablet, 10 mg , Disp: , Rfl:     ascorbic acid (VITAMIN C) 500 mg tablet, Take 500 mg by mouth daily, Disp: , Rfl:     beclomethasone (QVAR) 40 MCG/ACT inhaler, Inhale 2 puffs 2 (two) times a day Rinse mouth after use , Disp: , Rfl:     benzonatate (TESSALON PERLES) 100 mg capsule, Take 100 mg by mouth 3 (three) times a day as needed for cough, Disp: , Rfl:     budesonide (PULMICORT) 0 5 mg/2 mL nebulizer solution, Take 0 5 mg by nebulization 2 (two) times a day Rinse mouth after use , Disp: , Rfl:     calcium carbonate (OS-BRITTA) 600 MG tablet, Take 600 mg by mouth 2 (two) times a day with meals, Disp: , Rfl:     cholecalciferol (VITAMIN D3) 1,000 units tablet, Take 1,000 Units by mouth daily, Disp: , Rfl:     dexlansoprazole (Dexilant) 30 MG capsule, Take 1 capsule (30 mg total) by mouth daily, Disp: 30 capsule, Rfl: 6    GUAIFENESIN 1200 PO, Take 1,500 mg by mouth 2 (two) times a day, Disp: , Rfl:     hydrochlorothiazide (HYDRODIURIL) 25 mg tablet, Take 25 mg by mouth daily, Disp: , Rfl:     ipratropium-albuterol, FOR EMS ONLY, (DUO-NEB) 0 5-2 5 mg/3 mL nebulizer solution, 3 mL by Does not apply route 4 (four) times a day, Disp: , Rfl:     losartan (COZAAR) 50 mg tablet, Take 25 mg by mouth daily, Disp: , Rfl:     metFORMIN (GLUCOPHAGE-XR) 500 mg 24 hr tablet, Take 1,000 mg by mouth daily, Disp: , Rfl:     nitroglycerin (NITROSTAT) 0 4 mg SL tablet, , Disp: , Rfl:     OZEMPIC, 0 25 OR 0 5 MG/DOSE, 2 MG/1 5ML SOPN, , Disp: , Rfl:     potassium chloride (K-DUR,KLOR-CON) 20 mEq tablet, , Disp: , Rfl:     rosuvastatin (CRESTOR) 20 MG tablet, , Disp: , Rfl:     sulfamethoxazole-trimethoprim (BACTRIM DS) 800-160 mg per tablet, Take 1 tablet by mouth every 12 (twelve) hours for 5 days, Disp: 10 tablet, Rfl: 0    SYNTHROID 75 MCG tablet, , Disp: , Rfl:     venlafaxine (EFFEXOR-XR) 75 mg 24 hr capsule, , Disp: , Rfl:     Current Allergies     Allergies as of 07/18/2021 - Reviewed 07/18/2021   Allergen Reaction Noted    Epinephrine Shortness Of Breath and Palpitations 05/06/2020    Irbesartan-hydrochlorothiazide  05/06/2020    Lisinopril  05/06/2020    Tizanidine  05/06/2020              Past Medical History:   Diagnosis Date    Asthma     Colon cancer screening     Last colonoscopy September 2011  Due for follow-up 2021    Diabetes mellitus ([address] )     Disease of thyroid gland     Fatty liver disease, nonalcoholic     GERD (gastroesophageal reflux disease)     Last EGD 2016    History of pulmonary aspergillosis 2012    Hx of migraine headaches     pt does not have    Hyperlipidemia     Hypertension     Reactive airway disease     Restless leg syndrome        Past Surgical History:   Procedure Laterality Date    COLONOSCOPY      CORNEA LACERATION REPAIR      TONSILLECTOMY      WISDOM TOOTH EXTRACTION         Family History   Problem Relation Age of Onset    Heart disease Mother     Heart disease Father     Colon polyps Neg Hx     Colon cancer Neg Hx          Medications have been verified  The following portions of the patient's history were reviewed and updated as appropriate: allergies, current medications, past family history, past medical history, past social history, past surgical history and problem list     Objective   /73   Pulse 77   Resp 16   Ht 5' 4" (1 626 m)   Wt 70 9 kg (156 lb 3 2 oz)   LMP  (LMP Unknown)   SpO2 95%   BMI 26 81 kg/m²      Physical Exam     Physical Exam  Vitals and nursing note reviewed  Constitutional:       General: She is not in acute distress  Appearance: Normal appearance  She is not ill-appearing, toxic-appearing or diaphoretic  Cardiovascular:      Rate and Rhythm: Normal rate and regular rhythm  Pulmonary:      Effort: Pulmonary effort is normal  No respiratory distress     Skin:     Capillary Refill: Capillary refill takes less than 2 seconds  Comments: 2 cm wound to the dorsal aspect of the right foot with surrounding erythema and small amount of purulent drainage  No fluctuance  Area is indurated  Tender to palpation  No lymphangitis  Sutures are intact  Cap refill less than 2 seconds  Neurological:      Mental Status: She is alert and oriented to person, place, and time

## 2022-03-07 PROBLEM — R06.00 DYSPNEA: Status: ACTIVE | Noted: 2021-03-29

## 2022-03-07 PROBLEM — I70.91 GENERALIZED ATHEROSCLEROSIS: Status: ACTIVE | Noted: 2021-03-29

## 2022-03-07 PROBLEM — E78.5 DYSLIPIDEMIA: Status: ACTIVE | Noted: 2021-03-29

## 2022-03-07 PROBLEM — N95.0 POST-MENOPAUSAL BLEEDING: Status: ACTIVE | Noted: 2021-03-29

## 2022-03-07 PROBLEM — K76.0 NAFLD (NONALCOHOLIC FATTY LIVER DISEASE): Status: ACTIVE | Noted: 2021-03-29

## 2022-03-07 PROBLEM — E55.9 VITAMIN D DEFICIENCY: Status: ACTIVE | Noted: 2021-03-29

## 2022-03-07 PROBLEM — E11.29 TYPE 2 DIABETES MELLITUS WITH OTHER DIABETIC KIDNEY COMPLICATION (HCC): Status: ACTIVE | Noted: 2021-03-29

## 2022-03-07 PROBLEM — M21.961 DEFORMITY OF RIGHT FOOT: Status: ACTIVE | Noted: 2021-03-29

## 2022-03-07 PROBLEM — J45.909 ASTHMA: Status: ACTIVE | Noted: 2021-03-29

## 2022-03-07 PROBLEM — R91.1 SOLITARY PULMONARY NODULE: Status: ACTIVE | Noted: 2021-03-29

## 2022-03-07 PROBLEM — H26.9 CATARACT: Status: ACTIVE | Noted: 2021-03-29

## 2022-03-07 PROBLEM — R74.8 ABNORMAL LEVELS OF OTHER SERUM ENZYMES: Status: ACTIVE | Noted: 2021-03-29

## 2022-03-07 PROBLEM — I10 ESSENTIAL HYPERTENSION: Status: ACTIVE | Noted: 2021-03-29

## 2022-03-07 PROBLEM — E78.5 HYPERLIPIDEMIA: Status: ACTIVE | Noted: 2021-03-29

## 2022-03-07 PROBLEM — N18.9 CHRONIC KIDNEY DISEASE: Status: ACTIVE | Noted: 2021-03-29

## 2022-03-07 PROBLEM — D86.9 SARCOIDOSIS: Status: ACTIVE | Noted: 2021-03-29

## 2022-03-07 PROBLEM — Z12.11 COLON CANCER SCREENING: Status: RESOLVED | Noted: 2020-05-06 | Resolved: 2022-03-07

## 2022-03-07 PROBLEM — F33.9 MAJOR DEPRESSION, RECURRENT (HCC): Status: ACTIVE | Noted: 2021-03-29

## 2022-03-07 PROBLEM — E66.9 OBESITY: Status: ACTIVE | Noted: 2021-03-29

## 2022-03-07 PROBLEM — F41.9 ANXIETY: Status: ACTIVE | Noted: 2021-03-29

## 2022-03-07 PROBLEM — E03.9 HYPOTHYROID: Status: ACTIVE | Noted: 2021-03-29

## 2022-03-07 PROBLEM — Z79.899 CURRENT USE OF PROTON PUMP INHIBITOR: Status: ACTIVE | Noted: 2021-03-29

## 2022-03-07 PROBLEM — Z11.59 SCREENING EXAMINATION FOR ARTHROPOD-BORNE VIRAL DISEASE: Status: RESOLVED | Noted: 2020-10-15 | Resolved: 2022-03-07

## 2022-03-07 PROBLEM — L43.9 LICHEN PLANUS: Status: ACTIVE | Noted: 2021-03-29

## 2022-03-07 PROBLEM — M85.80 OSTEOPENIA: Status: ACTIVE | Noted: 2021-03-29

## 2022-03-07 NOTE — PROGRESS NOTES
Diabetic Foot Exam    Patient's shoes and socks removed  Right Foot/Ankle   Right Foot Inspection  Skin Exam: skin normal, skin intact, dry skin, callus and callus  No warmth, no erythema, no maceration, no abnormal color, no pre-ulcer and no ulcer  Toe Exam: right toe deformity (bunion deformity )  No swelling, no tenderness and erythema    Sensory   Vibration: intact  Proprioception: intact  Monofilament testing: diminished    Vascular  The right DP pulse is 2+  The right PT pulse is 2+  Right Toe  - Comprehensive Exam  Ecchymosis: none  Swelling: none         Left Foot/Ankle  Left Foot Inspection  Skin Exam: skin normal, skin intact, dry skin and callus  No warmth, no erythema, no maceration, normal color, no pre-ulcer and no ulcer  Toe Exam: ROM and strength within normal limits  Sensory   Vibration: intact  Proprioception: intact  Monofilament testing: intact    Vascular  The left DP pulse is 2+  The left PT pulse is 2+  Left Toe  - Comprehensive Exam  Ecchymosis: none  Swelling: none           Assign Risk Category  Deformity present  No loss of protective sensation  No weak pulses  Risk: 0      Assessment/Plan:    No problem-specific Assessment & Plan notes found for this encounter  Diagnoses and all orders for this visit:        Other specified hypothyroidism  -     TSH, 3rd generation with Free T4 reflex; Future  -     TSH, 3rd generation with Free T4 reflex  Her last TSH was in November of 2020  Lab Results   Component Value Date    TSH 0 776 11/24/2020     Clinically euthyroid  Will check tsh with her labs  Essential hypertension  -     Comprehensive metabolic panel; Future  -     Lipid Panel with Direct LDL reflex;  Future  -     Comprehensive metabolic panel  -     Lipid Panel with Direct LDL reflex  bp well controlled on current medications   She is taking losartan 100 mg, amlodipine 5 mg and hctz 25 mg daily  Mixed hyperlipidemia  -     Comprehensive metabolic panel; Future  -     Lipid Panel with Direct LDL reflex; Future  -     Comprehensive metabolic panel  -     Lipid Panel with Direct LDL reflex  No results found for: CHOLESTEROL  No results found for: HDL  No results found for: Jose tolerates crestor 20 mg daily with no issues  Last lipid panel was done 7/16/21  Total cholesterol was 188  LDL was 99  HDL was 50 and   Will repeat lipid panel  Encouraged her to watch diet/exercise  Type 2 diabetes mellitus without complication, without long-term current use of insulin (HCC)  -     Comprehensive metabolic panel; Future  -     Hemoglobin A1C; Future  -     Comprehensive metabolic panel  -     Hemoglobin A1C  Her last A1c was 7/16/21 and was 7 2  On metformin and ozempic  She watches diet  Has never been to diabetes education  Overdue for eye exam and plans to schedule  She was advised to have eye doctor send report to our office  Foot exam done today  Gastroesophageal reflux disease, unspecified whether esophagitis present  Stable on dexilant  Sees GI and had EGD in may of 2020  Generalized atherosclerosis    Osteopenia, unspecified location  Patient not aware of this diagnosis  She thinks last dexa was done around 2 years ago  Will obtain records  Will order DEXA  Cough  Lungs clear  Likely viral/exacerbation of her asthma  She declines rapid covid antigen test in office today  No other sx to suggest influenza  She will hold qvar and use her pulmicort for next week  Call if cough worsens, does not improve or if she develops fever, shortness of breath or wheeze  Abnormal levels of other serum enzymes  Last cmp in March of 2021showed slight elevation of her ALT at 40  AST was normal    She declines hep c screen  There is diagnosis of NAFLD on chart  She is not aware of this diagnosis  There is no imaging on chart to support this diagnosis  Will obtain her records  Repeat CMP  Advised to cut down on alcohol consumption     NAFLD (nonalcoholic fatty liver disease)    Recurrent major depressive disorder, remission status unspecified ([address] )  She is doing well on effexor XR  Is currently alternating 75 and 150 mg as she is not sure what dose she should be on  She wishes to decrease to 75 mg dose which I am in agreement with   Will call if any problems  Vitamin D deficiency  -     Vitamin D 25 hydroxy; Future  -     Vitamin D 25 hydroxy    Rectal bleeding  -     CBC and differential; Future  -     CBC and differential  Ongoing issue  Had colonoscopy in may of 2020  She plans to call gi for appt to address this  Check cbc  Sarcoidosis  Follows with pulmonology for this  Will obtain records  Encounter for screening for malignant neoplasm of breast, unspecified screening modality  -     Mammo screening bilateral w 3d & cad; Future    Post-menopausal  -     DXA bone density spine hip and pelvis; Future    Chronic right shoulder pain  -     XR shoulder 2+ vw right; Future  -     Ambulatory Referral to Physical Therapy; Future  Refer PT  Check xray  Right elbow pain  -     XR elbow 2 vw right; Future  Check xray  Immunity status testing  -     SARS CoV-2 ANTIBODY,IgG; Future  -     SARS CoV-2 ANTIBODY,IgG  -     Varicella zoster antibody, IgG; Future  -     Rubeola antibody IgG; Future  -     Rubella antibody, IgG; Future  -     Mumps antibody, IgG; Future  -     Varicella zoster antibody, IgG  -     Rubeola antibody IgG  -     Rubella antibody, IgG  -     Mumps antibody, IgG  Advised patient that insurance most likely will not cover and she still would like to have immunity testing for all of the above  Moderate persistent asthma with acute exacerbation  Presenting as tight cough  Start pulmicort and hold qvar while using pulmicort  Call if worsens or does not improve  Other orders  -     losartan (COZAAR) 100 MG tablet; Take 100 mg by mouth daily  -     Discontinue: venlafaxine (EFFEXOR-XR) 150 mg 24 hr capsule;  Take 150 mg by mouth daily  - Biotin 5000 MCG CAPS; Take 5,000 mcg by mouth in the morning  -     dexlansoprazole (DEXILANT) 60 MG capsule; Take 60 mg by mouth daily          Subjective:      Patient ID: González Morrissey is a 66 y o  female here today as a new patient to establish care  Moved to [place] from [place] 2 years ago  Previous PCP was Dr Kinza Doe at [facility] in  [facility]  Last AMWV 5/12/21  Mammogram was over 2 years ago  DEXA --not sure when this was done  According to problem list, she has osteopenia  She does not recall this diagnosis  Colonoscopy done 7/31/20 by Dr Attila Shahid at [address] for change in bowel habits  Polyp removed (tubular adenoma)  Recall 5 years  Last labs were done in July of last year  Last A1c was 7 2  she eats healthy diet  Chronic problems as listed below  Patient Active Problem List   Diagnosis    GERD (gastroesophageal reflux disease)--on dexilant  Doing well on this  Tells me she used ranitidine in past and this was ineffective  Had EGD in march of 2020    Change in bowel habits    Rectal bleeding--has some bleeding due to what she thinks is a hemorrhoid  Has had since prior to her colonoscopy in 2020  She plans to make appt with GI      Cough    Abnormal levels of other serum enzymes    Anxiety    Asthma--sees pulmonology (Dr Jessica Ortega); overdue for appt with him    Cataract--resolved   Chronic kidney disease    Current use of proton pump inhibitor    Deformity of right foot    Dyslipidemia    Dyspnea    Essential hypertension    Generalized atherosclerosis    Hyperlipidemia    Hypothyroid--on synthroid 75 mcg  Last labs were November of 2020  She denies any weight loss/gain, heat or cold intolerance, diarrhea or constipation, dry skin, palpitations   Lichen planus    Major depression, recurrent (HCC)    NAFLD (nonalcoholic fatty liver disease)--patient has no knowledge of this diagnosis   No imaging studies on her chart to back this up  Will obtain previous records       Osteopenia    Post-menopausal bleeding--patient denies this  Will remove   Sarcoidosis    Solitary pulmonary nodule--patient not aware of this diagnosis  No imaging noting this that I could find  Will obtain records   Type 2 diabetes mellitus with other diabetic kidney complication (HCC)--she is on metformin and ozempic  Watches diet  She has never been to diabetic education  Last eye exam was over a year ago  She was reminded that she needs to go  Lorenzo Mccallum)    Vitamin D deficiency     PHQ-2/9 Depression Screening    Little interest or pleasure in doing things: 0 - not at all  Feeling down, depressed, or hopeless: 1 - several days  Trouble falling or staying asleep, or sleeping too much: 0 - not at all  Feeling tired or having little energy: 0 - not at all  Poor appetite or overeatin - not at all  Feeling bad about yourself - or that you are a failure or have let yourself or your family down: 0 - not at all  Trouble concentrating on things, such as reading the newspaper or watching television: 0 - not at all  Moving or speaking so slowly that other people could have noticed  Or the opposite - being so fidgety or restless that you have been moving around a lot more than usual: 0 - not at all  Thoughts that you would be better off dead, or of hurting yourself in some way: 0 - not at all  PHQ-9 Score: 1   PHQ-9 Interpretation: No or Minimal depression      She has been taking effexor xr 75 mg alternating with 150 mg as she does not know which dose she is on  She is doing well on this and would like to decrease to the lower dose  She has multiple concerns today  1  Right shoulder and elbow pain for 2 years  Thinks arthritis  Is right hand dominant  Able to move shoulder  No injury  No neck pain at all  2  Recheck foot  Had sutures when she dropped knife on foot in July of last year   It became infected and she was treated with oral antibiotic (bactrim)  Was referred to [facility] foot and ankle at her visit 7/20/21  MRI 7/26/21 at [address] and some sugar Drive "complete tear of the hallucis longus tendon subjacent to a laceration with about 1 cm porximal retration to the level of the navicular  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  "  according to report, her results were discussed with Dr Dang Sanchez  She opted against having surgery  Still obviously has trouble moving the big toe  Does not affect her ambulation  3  Dry ears    4   cough  States that it is new and started only 4-5 days ago  No fever or chills  No shortness of breath or wheeze  Using her qvar but not her nebulized budesonide or albuterol/ipratropium  CXR 10/15/20 showed no acute cardiopulmonary disease  5  Wants covid antibody done and she wants to have MMR antibodies and varicella antibodies  Aware that insurance may not cover  HPI    The following portions of the patient's history were reviewed and updated as appropriate:   She  has a past medical history of Anxiety (3/29/2021), Asthma, Cataract (3/29/2021), COVID-19 (08/06/2021), Diabetes mellitus ([address] ), Disease of thyroid gland, Diverticulosis, Fatty liver disease, nonalcoholic, GERD (gastroesophageal reflux disease), History of pulmonary aspergillosis (2012), migraine headaches, Hyperlipidemia, Hypertension, Reactive airway disease, Restless leg syndrome, Sepsis ([address] ), Streptococcal pneumonia ([facility] [address] ), and Tubular adenoma (07/31/2020)  She  has a past surgical history that includes Anna tooth extraction; Cornea laceration repair (Right); Tonsillectomy; Colonoscopy; Refractive surgery; Cataract extraction, bilateral; and Cardiac catheterization  She  reports that she quit smoking about 36 years ago  Her smoking use included cigarettes  She started smoking about 61 years ago  She has a 6 25 pack-year smoking history   She has never used smokeless tobacco  She reports current alcohol use of about 14 0 standard drinks of alcohol per week  She reports previous drug use  Current Outpatient Medications on File Prior to Visit   Medication Sig    albuterol (Proventil HFA) 90 mcg/act inhaler Inhale 2 puffs 4 (four) times a day    amLODIPine (NORVASC) 5 mg tablet Take 5 mg by mouth daily      ascorbic acid (VITAMIN C) 500 mg tablet Take 500 mg by mouth daily    beclomethasone (QVAR) 40 MCG/ACT inhaler Inhale 2 puffs 2 (two) times a day Rinse mouth after use   benzonatate (TESSALON PERLES) 100 mg capsule Take 100 mg by mouth 3 (three) times a day as needed for cough    Biotin 5000 MCG CAPS Take 5,000 mcg by mouth in the morning    budesonide (PULMICORT) 0 5 mg/2 mL nebulizer solution Take 0 5 mg by nebulization 2 (two) times a day Rinse mouth after use      calcium carbonate (OS-BRITTA) 600 MG tablet Take 600 mg by mouth 2 (two) times a day with meals    cholecalciferol (VITAMIN D3) 1,000 units tablet Take 1,000 Units by mouth daily    dexlansoprazole (DEXILANT) 60 MG capsule Take 60 mg by mouth daily    GUAIFENESIN 1200 PO Take 1,500 mg by mouth 2 (two) times a day    hydrochlorothiazide (HYDRODIURIL) 25 mg tablet Take 25 mg by mouth daily    ipratropium-albuterol, FOR EMS ONLY, (DUO-NEB) 0 5-2 5 mg/3 mL nebulizer solution Use 3 mL as needed      losartan (COZAAR) 100 MG tablet Take 100 mg by mouth daily    metFORMIN (GLUCOPHAGE-XR) 500 mg 24 hr tablet Take 1,000 mg by mouth daily    nitroglycerin (NITROSTAT) 0 4 mg SL tablet     OZEMPIC, 0 25 OR 0 5 MG/DOSE, 2 MG/1 5ML SOPN     potassium chloride (K-DUR,KLOR-CON) 20 mEq tablet Take 20 mEq by mouth daily      rosuvastatin (CRESTOR) 20 MG tablet Take 20 mg by mouth daily      SYNTHROID 75 MCG tablet Take 75 mcg by mouth daily in the early morning      venlafaxine (EFFEXOR-XR) 75 mg 24 hr capsule Take 75 mg by mouth daily      [DISCONTINUED] venlafaxine (EFFEXOR-XR) 150 mg 24 hr capsule Take 150 mg by mouth daily    [DISCONTINUED] ALPRAZolam Hayley Carlton) 0 25 mg tablet Take by mouth 4 (four) times a day as needed for anxiety (Patient not taking: Reported on 3/9/2022 )    [DISCONTINUED] dexlansoprazole (Dexilant) 30 MG capsule Take 1 capsule (30 mg total) by mouth daily (Patient not taking: Reported on 3/9/2022 )    [DISCONTINUED] losartan (COZAAR) 50 mg tablet Take 25 mg by mouth daily (Patient not taking: Reported on 3/9/2022 )     No current facility-administered medications on file prior to visit  She is allergic to epinephrine, carisoprodol, irbesartan-hydrochlorothiazide, lisinopril, other, and tizanidine       Review of Systems      Objective:      /84 (BP Location: Left arm, Patient Position: Sitting, Cuff Size: Adult)   Pulse 92   Temp 98 9 °F (37 2 °C) (Tympanic)   Ht 5' 3 5" (1 613 m)   Wt 70 8 kg (156 lb)   LMP  (LMP Unknown)   SpO2 95%   BMI 27 20 kg/m²          Physical Exam  Vitals and nursing note reviewed  Constitutional:       General: She is not in acute distress  Appearance: Normal appearance  She is not ill-appearing, toxic-appearing or diaphoretic  HENT:      Head: Normocephalic and atraumatic  Right Ear: Tympanic membrane normal       Left Ear: Tympanic membrane normal       Nose: Nose normal       Mouth/Throat:      Mouth: Mucous membranes are moist       Pharynx: No oropharyngeal exudate or posterior oropharyngeal erythema  Eyes:      Extraocular Movements: Extraocular movements intact  Conjunctiva/sclera: Conjunctivae normal       Pupils: Pupils are equal, round, and reactive to light  Cardiovascular:      Rate and Rhythm: Normal rate and regular rhythm  Pulses: no weak pulses          Dorsalis pedis pulses are 2+ on the right side and 2+ on the left side  Posterior tibial pulses are 2+ on the right side and 2+ on the left side  Heart sounds: No murmur heard  Pulmonary:      Effort: Pulmonary effort is normal  No respiratory distress        Breath sounds: Normal breath sounds  No wheezing or rales  Abdominal:      General: Abdomen is flat  Bowel sounds are normal       Palpations: Abdomen is soft  There is no mass  Tenderness: There is no abdominal tenderness  There is no guarding  Musculoskeletal:      Cervical back: Normal range of motion and neck supple  Feet:       Comments: Right shoulder with full range of motion  Has no tenderness on palpation and no deformity  +empty can  Right elbow with some tenderness lateral epicondyle  Full flexion/extenion  No deformity or swelling   Feet:      Right foot:      Skin integrity: Callus and dry skin present  No ulcer, skin breakdown, erythema or warmth  Left foot:      Skin integrity: Callus and dry skin present  No ulcer, skin breakdown, erythema or warmth  Lymphadenopathy:      Cervical: No cervical adenopathy  Skin:     General: Skin is warm and dry  Findings: No rash  Neurological:      General: No focal deficit present  Mental Status: She is alert     Psychiatric:         Mood and Affect: Mood normal

## 2022-03-08 ENCOUNTER — TELEPHONE (OUTPATIENT)
Dept: ADMINISTRATIVE | Facility: OTHER | Age: 72
End: 2022-03-08

## 2022-03-08 NOTE — TELEPHONE ENCOUNTER
----- Message from Jori Aguayo DO sent at 3/7/2022  3:23 PM EST -----  Regarding: care gap request  11/18/20 3:57 PM    Hello, our patient attached above has had CRC: Colonoscopy completed/performed  Please assist in updating the patient chart by pulling the Care Everywhere (CE) document  The date of service is 7/31/20       Thank you,  Jori Aguayo DO  [facility]

## 2022-03-08 NOTE — TELEPHONE ENCOUNTER
----- Message from Terell Heredia DO sent at 3/7/2022  3:14 PM EST -----  Regarding: care gap request  11/18/20 3:57 PM    Hello, our patient attached above has had Medicare AWV completed/performed  Please assist in updating the patient chart by pulling the Care Everywhere (CE) document  The date of service is 5/21/21       Thank you,  Terell Heredia DO  [facility]

## 2022-03-08 NOTE — TELEPHONE ENCOUNTER
Colonoscopy:    Upon review of the In Basket request we were able to identify that the patient had the requested item(s) completed internally  Internal labs/procedures/tests are not able to be linked to [facility]  The item(s) requested can be found within the Chart Review tabs and a hyperlink is available in  in the Colorectal Cancer Screening section  Any additional questions or concerns should be emailed to the Practice Liaisons via [email or web address] email, please do not reply via In Basket      Thank you  Bill Molina MA

## 2022-03-08 NOTE — TELEPHONE ENCOUNTER
Upon review of the In Basket request we were able to locate, review, and update the patient chart as requested for Medicare AW  Any additional questions or concerns should be emailed to the Practice Liaisons via [email or web address] email, please do not reply via In Basket      Thank you  Inez Rivas MA

## 2022-03-09 ENCOUNTER — OFFICE VISIT (OUTPATIENT)
Dept: FAMILY MEDICINE CLINIC | Facility: CLINIC | Age: 72
End: 2022-03-09
Payer: COMMERCIAL

## 2022-03-09 VITALS
OXYGEN SATURATION: 95 % | TEMPERATURE: 98.9 F | WEIGHT: 156 LBS | BODY MASS INDEX: 26.63 KG/M2 | SYSTOLIC BLOOD PRESSURE: 134 MMHG | HEART RATE: 92 BPM | HEIGHT: 64 IN | DIASTOLIC BLOOD PRESSURE: 84 MMHG

## 2022-03-09 DIAGNOSIS — E55.9 VITAMIN D DEFICIENCY: ICD-10-CM

## 2022-03-09 DIAGNOSIS — M25.521 RIGHT ELBOW PAIN: ICD-10-CM

## 2022-03-09 DIAGNOSIS — I10 ESSENTIAL HYPERTENSION: ICD-10-CM

## 2022-03-09 DIAGNOSIS — R74.8 ABNORMAL LEVELS OF OTHER SERUM ENZYMES: ICD-10-CM

## 2022-03-09 DIAGNOSIS — Z12.39 ENCOUNTER FOR SCREENING FOR MALIGNANT NEOPLASM OF BREAST, UNSPECIFIED SCREENING MODALITY: ICD-10-CM

## 2022-03-09 DIAGNOSIS — E11.9 TYPE 2 DIABETES MELLITUS WITHOUT COMPLICATION, WITHOUT LONG-TERM CURRENT USE OF INSULIN (HCC): ICD-10-CM

## 2022-03-09 DIAGNOSIS — K62.5 RECTAL BLEEDING: ICD-10-CM

## 2022-03-09 DIAGNOSIS — M25.511 CHRONIC RIGHT SHOULDER PAIN: ICD-10-CM

## 2022-03-09 DIAGNOSIS — J45.41 MODERATE PERSISTENT ASTHMA WITH ACUTE EXACERBATION: ICD-10-CM

## 2022-03-09 DIAGNOSIS — Z78.0 POST-MENOPAUSAL: ICD-10-CM

## 2022-03-09 DIAGNOSIS — M85.80 OSTEOPENIA, UNSPECIFIED LOCATION: ICD-10-CM

## 2022-03-09 DIAGNOSIS — E03.8 OTHER SPECIFIED HYPOTHYROIDISM: Primary | ICD-10-CM

## 2022-03-09 DIAGNOSIS — R05.9 COUGH: ICD-10-CM

## 2022-03-09 DIAGNOSIS — G89.29 CHRONIC RIGHT SHOULDER PAIN: ICD-10-CM

## 2022-03-09 DIAGNOSIS — Z01.84 IMMUNITY STATUS TESTING: ICD-10-CM

## 2022-03-09 DIAGNOSIS — I70.91 GENERALIZED ATHEROSCLEROSIS: ICD-10-CM

## 2022-03-09 DIAGNOSIS — K21.9 GASTROESOPHAGEAL REFLUX DISEASE, UNSPECIFIED WHETHER ESOPHAGITIS PRESENT: ICD-10-CM

## 2022-03-09 DIAGNOSIS — E78.2 MIXED HYPERLIPIDEMIA: ICD-10-CM

## 2022-03-09 DIAGNOSIS — K76.0 NAFLD (NONALCOHOLIC FATTY LIVER DISEASE): ICD-10-CM

## 2022-03-09 DIAGNOSIS — E78.5 DYSLIPIDEMIA: ICD-10-CM

## 2022-03-09 DIAGNOSIS — D86.9 SARCOIDOSIS: ICD-10-CM

## 2022-03-09 DIAGNOSIS — F33.9 RECURRENT MAJOR DEPRESSIVE DISORDER, REMISSION STATUS UNSPECIFIED (HCC): ICD-10-CM

## 2022-03-09 PROBLEM — N18.9 CHRONIC KIDNEY DISEASE: Status: RESOLVED | Noted: 2021-03-29 | Resolved: 2022-03-09

## 2022-03-09 PROBLEM — R19.4 CHANGE IN BOWEL HABITS: Status: RESOLVED | Noted: 2020-07-01 | Resolved: 2022-03-09

## 2022-03-09 PROBLEM — H26.9 CATARACT: Status: RESOLVED | Noted: 2021-03-29 | Resolved: 2022-03-09

## 2022-03-09 PROBLEM — F41.9 ANXIETY: Status: RESOLVED | Noted: 2021-03-29 | Resolved: 2022-03-09

## 2022-03-09 PROBLEM — E66.9 OBESITY: Status: RESOLVED | Noted: 2021-03-29 | Resolved: 2022-03-09

## 2022-03-09 PROBLEM — N95.0 POST-MENOPAUSAL BLEEDING: Status: RESOLVED | Noted: 2021-03-29 | Resolved: 2022-03-09

## 2022-03-09 PROCEDURE — 3725F SCREEN DEPRESSION PERFORMED: CPT | Performed by: FAMILY MEDICINE

## 2022-03-09 PROCEDURE — 1160F RVW MEDS BY RX/DR IN RCRD: CPT | Performed by: FAMILY MEDICINE

## 2022-03-09 PROCEDURE — 1036F TOBACCO NON-USER: CPT | Performed by: FAMILY MEDICINE

## 2022-03-09 PROCEDURE — 99205 OFFICE O/P NEW HI 60 MIN: CPT | Performed by: FAMILY MEDICINE

## 2022-03-09 RX ORDER — DEXLANSOPRAZOLE 60 MG/1
60 CAPSULE, DELAYED RELEASE ORAL DAILY
COMMUNITY

## 2022-03-09 RX ORDER — VENLAFAXINE HYDROCHLORIDE 150 MG/1
150 CAPSULE, EXTENDED RELEASE ORAL DAILY
COMMUNITY
End: 2022-03-09

## 2022-03-09 RX ORDER — LOSARTAN POTASSIUM 100 MG/1
100 TABLET ORAL DAILY
COMMUNITY
Start: 2022-02-15 | End: 2022-04-21 | Stop reason: SDUPTHER

## 2022-03-11 PROBLEM — D80.1 HYPOGAMMAGLOBULINEMIA (HCC): Status: ACTIVE | Noted: 2022-03-11

## 2022-03-11 PROBLEM — J45.991 COUGH VARIANT ASTHMA: Status: ACTIVE | Noted: 2021-03-29

## 2022-03-25 ENCOUNTER — TELEPHONE (OUTPATIENT)
Dept: FAMILY MEDICINE CLINIC | Facility: CLINIC | Age: 72
End: 2022-03-25

## 2022-03-25 LAB
25(OH)D3+25(OH)D2 SERPL-MCNC: 43.4 NG/ML (ref 30–100)
ALBUMIN SERPL-MCNC: 4.7 G/DL (ref 3.7–4.7)
ALBUMIN/GLOB SERPL: 2 {RATIO} (ref 1.2–2.2)
ALP SERPL-CCNC: 126 IU/L (ref 44–121)
ALT SERPL-CCNC: 31 IU/L (ref 0–32)
AST SERPL-CCNC: 33 IU/L (ref 0–40)
BASOPHILS # BLD AUTO: 0.1 X10E3/UL (ref 0–0.2)
BASOPHILS NFR BLD AUTO: 1 %
BILIRUB SERPL-MCNC: 1.1 MG/DL (ref 0–1.2)
BUN SERPL-MCNC: 16 MG/DL (ref 8–27)
BUN/CREAT SERPL: 20 (ref 12–28)
CALCIUM SERPL-MCNC: 9.8 MG/DL (ref 8.7–10.3)
CHLORIDE SERPL-SCNC: 92 MMOL/L (ref 96–106)
CHOLEST SERPL-MCNC: 149 MG/DL (ref 100–199)
CO2 SERPL-SCNC: 25 MMOL/L (ref 20–29)
CREAT SERPL-MCNC: 0.81 MG/DL (ref 0.57–1)
EGFR: 77 ML/MIN/1.73
EOSINOPHIL # BLD AUTO: 0.2 X10E3/UL (ref 0–0.4)
EOSINOPHIL NFR BLD AUTO: 2 %
ERYTHROCYTE [DISTWIDTH] IN BLOOD BY AUTOMATED COUNT: 11.7 % (ref 11.7–15.4)
EST. AVERAGE GLUCOSE BLD GHB EST-MCNC: 160 MG/DL
GLOBULIN SER-MCNC: 2.3 G/DL (ref 1.5–4.5)
GLUCOSE SERPL-MCNC: 172 MG/DL (ref 65–99)
HBA1C MFR BLD: 7.2 % (ref 4.8–5.6)
HCT VFR BLD AUTO: 40.7 % (ref 34–46.6)
HDLC SERPL-MCNC: 51 MG/DL
HGB BLD-MCNC: 14.5 G/DL (ref 11.1–15.9)
IMM GRANULOCYTES # BLD: 0 X10E3/UL (ref 0–0.1)
IMM GRANULOCYTES NFR BLD: 0 %
LDLC SERPL CALC-MCNC: 81 MG/DL (ref 0–99)
LDLC/HDLC SERPL: 1.6 RATIO (ref 0–3.2)
LYMPHOCYTES # BLD AUTO: 1.5 X10E3/UL (ref 0.7–3.1)
LYMPHOCYTES NFR BLD AUTO: 15 %
MCH RBC QN AUTO: 34.7 PG (ref 26.6–33)
MCHC RBC AUTO-ENTMCNC: 35.6 G/DL (ref 31.5–35.7)
MCV RBC AUTO: 97 FL (ref 79–97)
MEV IGG SER IA-ACNC: >300 AU/ML
MONOCYTES # BLD AUTO: 0.8 X10E3/UL (ref 0.1–0.9)
MONOCYTES NFR BLD AUTO: 8 %
MUV IGG SER IA-ACNC: >300 AU/ML
NEUTROPHILS # BLD AUTO: 7.5 X10E3/UL (ref 1.4–7)
NEUTROPHILS NFR BLD AUTO: 74 %
PLATELET # BLD AUTO: 283 X10E3/UL (ref 150–450)
POTASSIUM SERPL-SCNC: 3.9 MMOL/L (ref 3.5–5.2)
PROT SERPL-MCNC: 7 G/DL (ref 6–8.5)
RBC # BLD AUTO: 4.18 X10E6/UL (ref 3.77–5.28)
RUBV IGG SERPL IA-ACNC: 28.9 INDEX
SARS-COV-2 SEMI-QUANT IGG AB: 107 AU/ML
SARS-COV-2 SPIKE AB INTERP CONTAINING ATTACHED ABBREV COVDSN: POSITIVE
SL AMB VLDL CHOLESTEROL CALC: 17 MG/DL (ref 5–40)
SODIUM SERPL-SCNC: 135 MMOL/L (ref 134–144)
TRIGL SERPL-MCNC: 90 MG/DL (ref 0–149)
TSH SERPL DL<=0.005 MIU/L-ACNC: 1.14 UIU/ML (ref 0.45–4.5)
VZV IGG SER IA-ACNC: 435 INDEX
WBC # BLD AUTO: 10.1 X10E3/UL (ref 3.4–10.8)

## 2022-03-25 PROCEDURE — 3051F HG A1C>EQUAL 7.0%<8.0%: CPT | Performed by: FAMILY MEDICINE

## 2022-03-25 NOTE — TELEPHONE ENCOUNTER
----- Message from Waqas Rocha DO sent at 3/25/2022  8:19 AM EDT -----  Immune to rubella and measles  Mumps titer still pending  Let her know this when you call with other labs  Please let patient know that I received and reviewed her lab test results  Her fasting sugar was elevated at 172  Her overall sugar (hemoglobin A1c) was 7 2 which is stable from previous labs last summer  While goal is <7, I would not recommend making any medication changes today  She should however really try to be active, and watch diet closely  Labs show that she is immune to covid meaning she has either had infection or vaccine in past  We do not know much about the protection this provides from future infection particularly as new strains arise  Her cbc showed no anemia, thyroid was normal and cholesterol levels were very good  Continue same dose of synthroid   Lastly, her vitamin d level was good at 43 4  Repeat labs in 6 months followed by appt  Order is in chart

## 2022-03-29 ENCOUNTER — TELEPHONE (OUTPATIENT)
Dept: FAMILY MEDICINE CLINIC | Facility: CLINIC | Age: 72
End: 2022-03-29

## 2022-03-29 ENCOUNTER — OFFICE VISIT (OUTPATIENT)
Dept: FAMILY MEDICINE CLINIC | Facility: CLINIC | Age: 72
End: 2022-03-29
Payer: COMMERCIAL

## 2022-03-29 VITALS
HEART RATE: 91 BPM | WEIGHT: 151 LBS | OXYGEN SATURATION: 96 % | SYSTOLIC BLOOD PRESSURE: 124 MMHG | HEIGHT: 64 IN | DIASTOLIC BLOOD PRESSURE: 76 MMHG | BODY MASS INDEX: 25.78 KG/M2 | TEMPERATURE: 98.5 F

## 2022-03-29 DIAGNOSIS — M21.611 BUNION OF RIGHT FOOT: ICD-10-CM

## 2022-03-29 DIAGNOSIS — U07.1 COVID-19: Primary | ICD-10-CM

## 2022-03-29 DIAGNOSIS — J45.901 EXACERBATION OF ASTHMA, UNSPECIFIED ASTHMA SEVERITY, UNSPECIFIED WHETHER PERSISTENT: ICD-10-CM

## 2022-03-29 DIAGNOSIS — D80.1 HYPOGAMMAGLOBULINEMIA (HCC): ICD-10-CM

## 2022-03-29 DIAGNOSIS — R50.9 FEVER, UNSPECIFIED FEVER CAUSE: ICD-10-CM

## 2022-03-29 LAB
SARS-COV-2 AG UPPER RESP QL IA: POSITIVE
VALID CONTROL: ABNORMAL

## 2022-03-29 PROCEDURE — 3008F BODY MASS INDEX DOCD: CPT | Performed by: FAMILY MEDICINE

## 2022-03-29 PROCEDURE — 87811 SARS-COV-2 COVID19 W/OPTIC: CPT | Performed by: FAMILY MEDICINE

## 2022-03-29 PROCEDURE — 99214 OFFICE O/P EST MOD 30 MIN: CPT | Performed by: FAMILY MEDICINE

## 2022-03-29 RX ORDER — DOXYCYCLINE HYCLATE 100 MG/1
100 CAPSULE ORAL EVERY 12 HOURS SCHEDULED
Qty: 14 CAPSULE | Refills: 0 | Status: CANCELLED | OUTPATIENT
Start: 2022-03-29 | End: 2022-04-05

## 2022-03-29 RX ORDER — PREDNISONE 10 MG/1
TABLET ORAL
Qty: 30 TABLET | Refills: 0 | Status: CANCELLED | OUTPATIENT
Start: 2022-03-29

## 2022-03-29 NOTE — PATIENT INSTRUCTIONS
The monoclonal antibody I recommend is called bebtelovimab   It is infused over 30 seconds and is administered at our infusion center in [place]

## 2022-03-29 NOTE — TELEPHONE ENCOUNTER
Pt calls in to report that she was confuesed on the days, she went home after her visit and looked on her calender where she writes everything down, pt states that her sx started on Friday March 25 and that is when she took a covid test as well

## 2022-03-29 NOTE — PROGRESS NOTES
Assessment/Plan:    No problem-specific Assessment & Plan notes found for this encounter  Diagnoses and all orders for this visit:    COVID-19    Fever, unspecified fever cause  -     POCT Rapid Covid Ag    Exacerbation of asthma, unspecified asthma severity, unspecified whether persistent  -     POCT Rapid Covid Ag    Hypogammaglobulinemia ([facility] [address] )      Rapid covid test in office today was positive  Today is day # 6 of her illness  We discussed various treatment options available to her  I recommended the bebtelovimab monoclonal antibody infusion and she is still within the timeframe but she does not want to drive to [facility] which is the only location that [facility]'s offers the infusion  She is not candidate for molnupiravir or paxlovid due to today being Day #6 of illness  We briefly discussed use of fluvoxamine  Given her depression history, will avoid this  She will continue her pulmicort nebulizer bid as she is already doing  Will check pulse ox at home and call her pulmonologist for his recommendations  He is affiliated with [address] [place] [address] and there is possibility they offer infusion therapy there which patient would agree to do  Take vitamin D, C as she is doing and add zinc          Subjective:      Patient ID: Kenzie Collier is a 66 y o  female with hypertension, hyperlipidemia, DM2, hypothyroidism, GERD, hypogammaglobulinemia, asthma, chronic cough and sarcoidosis and depression here today for complaint of fever, chills, chest congestion, sore throat and cough  Cough is productive of grayish-green sputum  Some shortness of breath and some wheeze  Cloteal Patti is the first she has had a fever  tmax 100  2  no ill contacts  No travel  She is not able to sleep due to her cough  Symptoms began 5 days ago  She states that she did an "at home" covid antigen test on 3/26/22 which resulted as negative       She started using her pulmicort and atrovent nebulizers as soon as she fell ill      Her pulmonologist is Dr Carson Opitz in [place]  She did have covid illness but did not have vaccine nor does she want it  Did not have flu vaccine this season  HPI    The following portions of the patient's history were reviewed and updated as appropriate:   She  has a past medical history of Anxiety (03/29/2021), Cataract (03/29/2021), cough variant asthma, COVID-19 (08/06/2021), Diabetes mellitus ([facility] [address] ), Disease of thyroid gland, Diverticulosis, Fatty liver disease, nonalcoholic, GERD (gastroesophageal reflux disease), History of pulmonary aspergillosis (2012), migraine headaches, Hyperlipidemia, Hypertension, Hypogammaglobulinemia ([facility] [address] ), Pulmonary embolism ([facility] [address] ), Reactive airway disease, Restless leg syndrome, Sepsis ([facility] [address] ), Streptococcal pneumonia ([facility] [address] ), and Tubular adenoma (07/31/2020)  She  has a past surgical history that includes [place] tooth extraction; Cornea laceration repair (Right); Tonsillectomy; Colonoscopy; Refractive surgery; Cataract extraction, bilateral; and Cardiac catheterization  She  reports that she quit smoking about 36 years ago  Her smoking use included cigarettes  She started smoking about 61 years ago  She has a 6 25 pack-year smoking history  She has never used smokeless tobacco  She reports current alcohol use of about 14 0 standard drinks of alcohol per week  She reports previous drug use  Current Outpatient Medications on File Prior to Visit   Medication Sig    albuterol (Proventil HFA) 90 mcg/act inhaler Inhale 2 puffs 4 (four) times a day    amLODIPine (NORVASC) 5 mg tablet Take 5 mg by mouth daily      ascorbic acid (VITAMIN C) 500 mg tablet Take 500 mg by mouth daily    beclomethasone (QVAR) 40 MCG/ACT inhaler Inhale 2 puffs 2 (two) times a day Rinse mouth after use      benzonatate (TESSALON PERLES) 100 mg capsule Take 100 mg by mouth 3 (three) times a day as needed for cough    Biotin 5000 MCG CAPS Take 5,000 mcg by mouth in the morning  budesonide (PULMICORT) 0 5 mg/2 mL nebulizer solution Take 0 5 mg by nebulization 2 (two) times a day Rinse mouth after use   calcium carbonate (OS-BRITTA) 600 MG tablet Take 600 mg by mouth 2 (two) times a day with meals    cholecalciferol (VITAMIN D3) 1,000 units tablet Take 1,000 Units by mouth daily    dexlansoprazole (DEXILANT) 60 MG capsule Take 60 mg by mouth daily    GUAIFENESIN 1200 PO Take 1,500 mg by mouth 2 (two) times a day    hydrochlorothiazide (HYDRODIURIL) 25 mg tablet Take 25 mg by mouth daily    ipratropium-albuterol, FOR EMS ONLY, (DUO-NEB) 0 5-2 5 mg/3 mL nebulizer solution Use 3 mL as needed      losartan (COZAAR) 100 MG tablet Take 100 mg by mouth daily    metFORMIN (GLUCOPHAGE-XR) 500 mg 24 hr tablet Take 1,000 mg by mouth daily    nitroglycerin (NITROSTAT) 0 4 mg SL tablet     OZEMPIC, 0 25 OR 0 5 MG/DOSE, 2 MG/1 5ML SOPN     potassium chloride (K-DUR,KLOR-CON) 20 mEq tablet Take 20 mEq by mouth daily      rosuvastatin (CRESTOR) 20 MG tablet Take 20 mg by mouth daily      SYNTHROID 75 MCG tablet Take 75 mcg by mouth daily in the early morning      venlafaxine (EFFEXOR-XR) 75 mg 24 hr capsule Take 75 mg by mouth daily       No current facility-administered medications on file prior to visit  She is allergic to epinephrine, carisoprodol, irbesartan-hydrochlorothiazide, lisinopril, other, and tizanidine       Review of Systems      Objective:      /76 (BP Location: Left arm, Patient Position: Sitting, Cuff Size: Adult)   Pulse 91   Temp 98 5 °F (36 9 °C) (Tympanic)   Ht 5' 3 5" (1 613 m)   Wt 68 5 kg (151 lb)   LMP  (LMP Unknown)   SpO2 96%   BMI 26 33 kg/m²          Physical Exam  Vitals and nursing note reviewed  Constitutional:       General: She is not in acute distress  Appearance: Normal appearance  She is obese  She is not ill-appearing, toxic-appearing or diaphoretic        Comments: Patient with harsh cough throughout visit   HENT:      Head: Normocephalic and atraumatic  Right Ear: Tympanic membrane normal       Left Ear: Tympanic membrane normal       Nose: Nose normal  No congestion or rhinorrhea  Mouth/Throat:      Mouth: Mucous membranes are moist       Pharynx: Posterior oropharyngeal erythema present  No oropharyngeal exudate  Eyes:      Extraocular Movements: Extraocular movements intact  Conjunctiva/sclera: Conjunctivae normal       Pupils: Pupils are equal, round, and reactive to light  Cardiovascular:      Rate and Rhythm: Normal rate and regular rhythm  Heart sounds: No murmur heard  Pulmonary:      Effort: Pulmonary effort is normal  No respiratory distress  Breath sounds: Wheezing present  No rales  Comments: Faint wheezes throughout bilateral lungs on forced expiration  Musculoskeletal:      Cervical back: Normal range of motion  Right lower leg: No edema  Left lower leg: No edema  Neurological:      Mental Status: She is alert     Psychiatric:         Mood and Affect: Mood normal

## 2022-03-29 NOTE — TELEPHONE ENCOUNTER
Noted  That changes treatment options that are available to her  She was interested in receiving the oral antiviral when she was here  If indeed she is within 5 days of symptom onset, this could be of benefit to her so I will send rx to her pharmacy  [facility] has [facility] listed as her pharmacy of choice  I am not sure whether [facility] carries this  Let me know if not and I can send it elsewhere

## 2022-03-30 ENCOUNTER — TELEPHONE (OUTPATIENT)
Dept: ADMINISTRATIVE | Facility: OTHER | Age: 72
End: 2022-03-30

## 2022-03-30 NOTE — TELEPHONE ENCOUNTER
----- Message from Baudilio Ralph DO sent at 3/29/2022  1:11 PM EDT -----  Regarding: care gap request  11/18/20 3:57 PM    Hello, our patient attached above has had Diabetic Eye Exam completed/performed  Please assist in updating the patient chart by making an External outreach to Dr Tam Olson facility located in [address] [phone]  The date of service is 6/9/20    THERE IS A COPY OF THE EYE EXAM ON HER CHART (SEE UNDER ENCOUNTERS, SCANNED DOCUMENTS)      Thank you,  Baudilio Ralph DO  [facility]

## 2022-03-30 NOTE — TELEPHONE ENCOUNTER
Upon review of the In Basket request we have noted that the document is from 2019,we outreached that is the last visit, see contact info also we would be missing key verbiage, ( diabetic retinopathy) in order to meet Insurance measures  because of this we are requesting that you forward this request/concern to the [employer] email  The Quality team members assigned to this email will be more than happy to assist you  Any additional questions or concerns should be emailed to the Practice Liaisons via [email or web address] email, please do not reply via In Basket      Thank you  Para Staff

## 2022-04-20 NOTE — PROGRESS NOTES
Assessment/Plan:    No problem-specific Assessment & Plan notes found for this encounter  Diagnoses and all orders for this visit:    Essential hypertension  -     hydrochlorothiazide (HYDRODIURIL) 25 mg tablet; Take 1 tablet (25 mg total) by mouth daily  -     losartan (COZAAR) 100 MG tablet; Take 1 tablet (100 mg total) by mouth daily  bp not at goal  rx refilled today  Moderate persistent asthma with exacerbation  -     predniSONE 10 mg tablet; 5 tablets QD x 3 days, 4 tablets QD x 2 days, 3 tablets QD x 1 days, 2 tablets QD x 1 days then once daily x1 days  -     azithromycin (ZITHROMAX) 250 mg tablet; Take 2 tablets today then 1 tablet daily x 4 days  Patient fully recovered from covid 19 infection one month ago  Started with cough, shortness of breath and fever 3 days ago  She did not get flu vaccine this season and advised that this could be influenza  Unfortunately we do not have any rapid flu tests here in our office and unable to send out swab as they are covid/flu combination swabs and she just had covid in march so will not let me order  Given her hypgammaglobulinemia, will cover for bacterial etiology with zpack  rx for prednisone taper  She will call if sx worsen or do not improve  Hypogammaglobulinemia (HCC)  -     azithromycin (ZITHROMAX) 250 mg tablet; Take 2 tablets today then 1 tablet daily x 4 days          Subjective:      Patient ID: Andree Lora is a 66 y o  female with hypertension, hyperlipidemia, DM2, hypothyroidism, GERD, depression,  hypogammaglobulinemia, asthma, chronic cough and sarcoidosis who was seen on 3/29/22 for complaint of fever, chills, chest congestion, sore throat and cough  Rapid covid antigen test in office was positive  She was treated with oral antiviral (molnupiravir)  She notes that she recovered within a couple of days and was back to her baseline  Is here today for complaint of cough, chest congestion and shortness of breath   Symptoms began 3 days ago  Some associated bodyaches and fever  tmax 101  She has had some wheezing  Using her nebulized albuterol which does help  Does not like using at hs as it makes her jittery  Has not been using her qvar on regular basis  States that she may have confused this with her rescue inhaler    She states that the only thing that works when she gets this way is prednisone  Follows with pulmonology at [facility]  Has appt in mid may    Did not get flu vaccine this season  HPI    The following portions of the patient's history were reviewed and updated as appropriate:   She  has a past medical history of Anxiety (03/29/2021), Cataract (03/29/2021), cough variant asthma, COVID-19 (08/06/2021), Diabetes mellitus ([address] ), Disease of thyroid gland, Diverticulosis, Fatty liver disease, nonalcoholic, GERD (gastroesophageal reflux disease), History of pulmonary aspergillosis (2012), migraine headaches, Hyperlipidemia, Hypertension, Hypogammaglobulinemia ([facility] [address] ), Pulmonary embolism ([address] ), Reactive airway disease, Restless leg syndrome, Sepsis ([address] ), Streptococcal pneumonia ([address] ), and Tubular adenoma (07/31/2020)  She  has a past surgical history that includes [place] tooth extraction; Cornea laceration repair (Right); Tonsillectomy; Colonoscopy; Refractive surgery; Cataract extraction, bilateral; and Cardiac catheterization  She  reports that she quit smoking about 36 years ago  Her smoking use included cigarettes  She started smoking about 61 years ago  She has a 6 25 pack-year smoking history  She has never used smokeless tobacco  She reports current alcohol use of about 14 0 standard drinks of alcohol per week  She reports previous drug use    Current Outpatient Medications on File Prior to Visit   Medication Sig    albuterol (Proventil HFA) 90 mcg/act inhaler Inhale 2 puffs 4 (four) times a day    amLODIPine (NORVASC) 5 mg tablet Take 5 mg by mouth daily      ascorbic acid (VITAMIN C) 500 mg tablet Take 500 mg by mouth daily    beclomethasone (QVAR) 40 MCG/ACT inhaler Inhale 2 puffs 2 (two) times a day Rinse mouth after use   benzonatate (TESSALON PERLES) 100 mg capsule Take 100 mg by mouth 3 (three) times a day as needed for cough    Biotin 5000 MCG CAPS Take 5,000 mcg by mouth in the morning    budesonide (PULMICORT) 0 5 mg/2 mL nebulizer solution Take 0 5 mg by nebulization 2 (two) times a day Rinse mouth after use   calcium carbonate (OS-BRITTA) 600 MG tablet Take 600 mg by mouth 2 (two) times a day with meals    cholecalciferol (VITAMIN D3) 1,000 units tablet Take 1,000 Units by mouth daily    dexlansoprazole (DEXILANT) 60 MG capsule Take 60 mg by mouth daily    GUAIFENESIN 1200 PO Take 1,500 mg by mouth 2 (two) times a day    ipratropium-albuterol, FOR EMS ONLY, (DUO-NEB) 0 5-2 5 mg/3 mL nebulizer solution Use 3 mL as needed      metFORMIN (GLUCOPHAGE-XR) 500 mg 24 hr tablet Take 1,000 mg by mouth daily    nitroglycerin (NITROSTAT) 0 4 mg SL tablet     OZEMPIC, 0 25 OR 0 5 MG/DOSE, 2 MG/1 5ML SOPN     potassium chloride (K-DUR,KLOR-CON) 20 mEq tablet Take 20 mEq by mouth daily      rosuvastatin (CRESTOR) 20 MG tablet Take 20 mg by mouth daily      SYNTHROID 75 MCG tablet Take 75 mcg by mouth daily in the early morning      venlafaxine (EFFEXOR-XR) 75 mg 24 hr capsule Take 75 mg by mouth daily      [DISCONTINUED] hydrochlorothiazide (HYDRODIURIL) 25 mg tablet Take 25 mg by mouth daily    [DISCONTINUED] losartan (COZAAR) 100 MG tablet Take 100 mg by mouth daily     No current facility-administered medications on file prior to visit  She is allergic to epinephrine, carisoprodol, irbesartan-hydrochlorothiazide, lisinopril, other, and tizanidine       Review of Systems      Objective:      /78 (BP Location: Left arm, Patient Position: Sitting, Cuff Size: Adult)   Pulse 76   Temp 98 6 °F (37 °C) (Tympanic)   Ht 5' 3 5" (1 613 m)   Wt 70 3 kg (155 lb)   LMP  (LMP Unknown)   SpO2 94% BMI 27 03 kg/m²          Physical Exam  Vitals and nursing note reviewed  Constitutional:       General: She is not in acute distress  Appearance: Normal appearance  She is not ill-appearing, toxic-appearing or diaphoretic  HENT:      Head: Normocephalic and atraumatic  Right Ear: Tympanic membrane normal       Left Ear: Tympanic membrane normal       Nose: Nose normal       Mouth/Throat:      Mouth: Mucous membranes are moist       Pharynx: No oropharyngeal exudate or posterior oropharyngeal erythema  Eyes:      Extraocular Movements: Extraocular movements intact  Conjunctiva/sclera: Conjunctivae normal       Pupils: Pupils are equal, round, and reactive to light  Cardiovascular:      Rate and Rhythm: Normal rate and regular rhythm  Heart sounds: No murmur heard  Pulmonary:      Effort: Pulmonary effort is normal  No respiratory distress  Breath sounds: Normal breath sounds  No stridor  No wheezing or rhonchi  Comments: Tight barky cough during visit  Musculoskeletal:      Cervical back: Normal range of motion and neck supple  Right lower leg: No edema  Left lower leg: No edema  Lymphadenopathy:      Cervical: No cervical adenopathy  Neurological:      Mental Status: She is alert

## 2022-04-21 ENCOUNTER — OFFICE VISIT (OUTPATIENT)
Dept: FAMILY MEDICINE CLINIC | Facility: CLINIC | Age: 72
End: 2022-04-21
Payer: COMMERCIAL

## 2022-04-21 VITALS
SYSTOLIC BLOOD PRESSURE: 144 MMHG | OXYGEN SATURATION: 94 % | DIASTOLIC BLOOD PRESSURE: 78 MMHG | TEMPERATURE: 98.6 F | HEIGHT: 64 IN | HEART RATE: 76 BPM | BODY MASS INDEX: 26.46 KG/M2 | WEIGHT: 155 LBS

## 2022-04-21 DIAGNOSIS — I10 ESSENTIAL HYPERTENSION: Primary | ICD-10-CM

## 2022-04-21 DIAGNOSIS — J45.41 MODERATE PERSISTENT ASTHMA WITH EXACERBATION: ICD-10-CM

## 2022-04-21 DIAGNOSIS — D80.1 HYPOGAMMAGLOBULINEMIA (HCC): ICD-10-CM

## 2022-04-21 PROCEDURE — 1036F TOBACCO NON-USER: CPT | Performed by: FAMILY MEDICINE

## 2022-04-21 PROCEDURE — 99214 OFFICE O/P EST MOD 30 MIN: CPT | Performed by: FAMILY MEDICINE

## 2022-04-21 PROCEDURE — 1160F RVW MEDS BY RX/DR IN RCRD: CPT | Performed by: FAMILY MEDICINE

## 2022-04-21 PROCEDURE — 3078F DIAST BP <80 MM HG: CPT | Performed by: FAMILY MEDICINE

## 2022-04-21 PROCEDURE — 3008F BODY MASS INDEX DOCD: CPT | Performed by: FAMILY MEDICINE

## 2022-04-21 PROCEDURE — 4010F ACE/ARB THERAPY RXD/TAKEN: CPT | Performed by: FAMILY MEDICINE

## 2022-04-21 PROCEDURE — 3077F SYST BP >= 140 MM HG: CPT | Performed by: FAMILY MEDICINE

## 2022-04-21 RX ORDER — HYDROCHLOROTHIAZIDE 25 MG/1
25 TABLET ORAL DAILY
Qty: 90 TABLET | Refills: 1 | Status: SHIPPED | OUTPATIENT
Start: 2022-04-21

## 2022-04-21 RX ORDER — PREDNISONE 10 MG/1
TABLET ORAL
Qty: 33 TABLET | Refills: 0 | Status: SHIPPED | OUTPATIENT
Start: 2022-04-21 | End: 2022-05-16

## 2022-04-21 RX ORDER — LOSARTAN POTASSIUM 100 MG/1
100 TABLET ORAL DAILY
Qty: 90 TABLET | Refills: 1 | Status: SHIPPED | OUTPATIENT
Start: 2022-04-21

## 2022-04-21 RX ORDER — AZITHROMYCIN 250 MG/1
TABLET, FILM COATED ORAL
Qty: 6 TABLET | Refills: 0 | Status: SHIPPED | OUTPATIENT
Start: 2022-04-21 | End: 2022-04-25

## 2022-04-21 NOTE — PATIENT INSTRUCTIONS
Take the prednisone taper as prescribed  Take the antibiotic as prescribed  Start using your qvar twice daily on regular basis     Call if the cough persists, fever persists or your symptoms worsen in severity

## 2022-05-02 ENCOUNTER — TELEPHONE (OUTPATIENT)
Dept: FAMILY MEDICINE CLINIC | Facility: CLINIC | Age: 72
End: 2022-05-02

## 2022-05-10 ENCOUNTER — OFFICE VISIT (OUTPATIENT)
Dept: PODIATRY | Facility: CLINIC | Age: 72
End: 2022-05-10
Payer: COMMERCIAL

## 2022-05-10 VITALS
HEIGHT: 64 IN | WEIGHT: 155 LBS | HEART RATE: 79 BPM | DIASTOLIC BLOOD PRESSURE: 83 MMHG | SYSTOLIC BLOOD PRESSURE: 144 MMHG | BODY MASS INDEX: 26.46 KG/M2

## 2022-05-10 DIAGNOSIS — M19.071 PRIMARY OSTEOARTHRITIS OF RIGHT FOOT: ICD-10-CM

## 2022-05-10 DIAGNOSIS — M21.611 BUNION OF RIGHT FOOT: ICD-10-CM

## 2022-05-10 DIAGNOSIS — E11.9 TYPE 2 DIABETES MELLITUS WITHOUT COMPLICATION, WITHOUT LONG-TERM CURRENT USE OF INSULIN (HCC): ICD-10-CM

## 2022-05-10 DIAGNOSIS — S96.819A RUPTURE OF EXTENSOR TENDON OF FOOT: Primary | Chronic | ICD-10-CM

## 2022-05-10 PROCEDURE — 99204 OFFICE O/P NEW MOD 45 MIN: CPT | Performed by: PODIATRIST

## 2022-05-10 PROCEDURE — 3077F SYST BP >= 140 MM HG: CPT | Performed by: PODIATRIST

## 2022-05-10 PROCEDURE — 3079F DIAST BP 80-89 MM HG: CPT | Performed by: PODIATRIST

## 2022-05-10 NOTE — PATIENT INSTRUCTIONS
Foot Care for People with Diabetes   WHAT YOU NEED TO KNOW:   · Foot care helps protect your feet and prevent foot ulcers or sores  Long-term high blood sugar levels can damage the blood vessels and nerves in your legs and feet  This damage makes it hard to feel pressure, pain, temperature, and touch  You may not be able to feel a cut or sore, or shoes that are too tight  Foot care is needed to prevent serious problems, such as an infection or amputation  · Diabetes may cause your toes to become crooked or curved under  These changes may affect the way you walk and can lead to increased pressure on your foot  The pressure can decrease blood flow to your feet  Lack of blood flow increases your risk for a foot ulcer  Do not ignore small problems, such as dry skin or small wounds  These can become life-threatening over time without proper care  DISCHARGE INSTRUCTIONS:   Call your care team provider if:   · Your feet become numb, weak, or hard to move  · You have pus draining from a sore on your foot  · You have a wound on your foot that gets bigger, deeper, or does not heal      · You see blisters, cuts, scratches, calluses, or sores on your foot  · You have a fever, and your feet become red, warm, and swollen  · Your toenails become thick, curled, or yellow  · You find it hard to check your feet because your vision is poor  · You have questions or concerns about your condition or care  Foot care:   · Check your feet each day  Look at your whole foot, including the bottom, and between and under your toes  Check for wounds, corns, and calluses  Use a mirror to see the bottom of your feet  The skin on your feet may be shiny, tight, or darker than normal  Your feet may also be cold and pale  Feel your feet by running your hands along the tops, bottoms, sides, and between your toes  Redness, swelling, and warmth are signs of blood flow problems that can lead to a foot ulcer   Do not try to remove corns or calluses yourself  · Wash your feet each day with soap and warm water  Do not use hot water, because this can injure your foot  Dry your feet gently with a towel after you wash them  Dry between and under your toes  · Apply lotion or a moisturizer on your dry feet  Ask your care team provider what lotions are best to use  Do not put lotion or moisturizer between your toes  Moisture between your toes could lead to skin breakdown  · Cut your toenails correctly  File or cut your toenails straight across  Use a soft brush to clean around your toenails  If your toenails are very thick, you may need to have a care team provider or specialist cut them  · Protect your feet  Do not walk barefoot or wear your shoes without socks  Check your shoes for rocks or other objects that can hurt your feet  Wear cotton socks to help keep your feet dry  Wear socks without toe seams, or wear them with the seams inside out  Change your socks each day  Do not wear socks that are dirty or damp  · Wear shoes that fit well  Wear shoes that do not rub against any area of your feet  Your shoes should be ½ to ¾ inch (1 to 2 centimeters) longer than your feet  Your shoes should also have extra space around the widest part of your feet  Walking or athletic shoes with laces or straps that adjust are best  Ask your care team provider for help to choose shoes that fit you best  Ask him or her if you need to wear an insert, orthotic, or bandage on your feet  · Do not smoke  Smoking can damage your blood vessels and put you at increased risk for foot ulcers  Ask your care team provider for information if you currently smoke and need help to quit  E-cigarettes or smokeless tobacco still contain nicotine  Talk to your care team provider before you use these products  Follow up with your diabetes care team provider or foot specialist as directed:   You will need to have your feet checked at least once a anali Cooper may need a foot exam more often if you have nerve damage, foot deformities, or ulcers  Write down your questions so you remember to ask them during your visits  © Copyright [employer] 2022 Information is for End User's use only and may not be sold, redistributed or otherwise used for commercial purposes  All illustrations and images included in CareNotes® are the copyrighted property of A D A M [employer]  or Hipolito Sarkar  The above information is an  only  It is not intended as medical advice for individual conditions or treatments  Talk to your doctor, nurse or pharmacist before following any medical regimen to see if it is safe and effective for you

## 2022-05-10 NOTE — PROGRESS NOTES
Assessment/Plan:         Diagnoses and all orders for this visit:    Rupture of extensor tendon of foot  Comments:  old injury, no acute care at this time, proper shoes    Bunion of right foot  Comments:  asymtpomatic  Orders:  -     Ambulatory Referral to Podiatry    Type 2 diabetes mellitus without complication, without long-term current use of insulin ([facility] [address] )    Primary osteoarthritis of right foot      Diagnosis and options discussed with patient  Patient agreeable to the plan as stated below    -Educated on DM risk to lower extremities, proper shoe gear, and daily monitoring of feet    -Educated on A1C and the risks of poorly controlled Diabetes  Reviewed recent A1C, 7 2  -Discussed weight loss and suitable exercise regiment  Reviewed most recent PCP visit on 4/21/2022  -patient has good neurovascular status to her feet in her blood sugars well controlled  Annual diabetic foot exam unless new concerns arise    Regarding her previous laceration to her keep the extensor tendon on her right great toe  At this point although the tendon is nonfunctional she does not have any physical handicap from it  Given the injury occurred over a year ago both she and I felt reconstruction was unnecessary  Discussed proper shoe gear  I explained the toe may drift over time but we will handle these complications as they arrive  Patient is very agreeable to avoid any surgery and happy with her current treatment plan  Subjective:      Patient ID: Franchesca Castillo is a 66 y o  female  Patient had an accident a year ago when a knife fell on her right foot  She severed a tendon  A podiatrist recommend she get the tendon repaired but she opted out of the surgery  Now the big toe is rubbing against the second toe  It is more annoying than painful  There is a bony bump on top of the right foot which hurts         The following portions of the patient's history were reviewed and updated as appropriate:   She  has a past medical history of Anxiety (03/29/2021), Cataract (03/29/2021), cough variant asthma, COVID-19 (08/06/2021), Diabetes mellitus ([facility] ), Disease of thyroid gland, Diverticulosis, Fatty liver disease, nonalcoholic, GERD (gastroesophageal reflux disease), History of pulmonary aspergillosis (2012), migraine headaches, Hyperlipidemia, Hypertension, Hypogammaglobulinemia ([facility] ), Pulmonary embolism ([place] [address] ), Reactive airway disease, Restless leg syndrome, Sepsis ([facility] ), Streptococcal pneumonia ([place] [address] ), and Tubular adenoma (07/31/2020)  She   Patient Active Problem List    Diagnosis Date Noted    Rupture of extensor tendon of foot 05/10/2022    Hypogammaglobulinemia ([place] [address] ) 03/11/2022    Abnormal levels of other serum enzymes 03/29/2021    Cough variant asthma 03/29/2021    Current use of proton pump inhibitor 03/29/2021    Deformity of right foot 03/29/2021    Dyspnea 03/29/2021    Essential hypertension 03/29/2021    Generalized atherosclerosis 03/29/2021    Hyperlipidemia 03/29/2021    Hypothyroid 35/35/5386    Lichen planus 81/46/5116    Major depression, recurrent ([place] [address] ) 03/29/2021    NAFLD (nonalcoholic fatty liver disease) 03/29/2021    Osteopenia 03/29/2021    Sarcoidosis 03/29/2021    Solitary pulmonary nodule 03/29/2021    Type 2 diabetes mellitus without complication ([place] [address] ) 03/08/7435    Vitamin D deficiency 03/29/2021    Cough 10/15/2020    Rectal bleeding 07/01/2020    GERD (gastroesophageal reflux disease)      She  has a past surgical history that includes [place] tooth extraction; Cornea laceration repair (Right); Tonsillectomy; Colonoscopy; Refractive surgery; Cataract extraction, bilateral; and Cardiac catheterization  Her family history includes Heart disease in her father and mother  She  reports that she quit smoking about 36 years ago  Her smoking use included cigarettes  She started smoking about 61 years ago  She has a 6 25 pack-year smoking history   She has never used smokeless tobacco  She reports current alcohol use of about 14 0 standard drinks of alcohol per week  She reports previous drug use  Current Outpatient Medications   Medication Sig Dispense Refill    albuterol (Proventil HFA) 90 mcg/act inhaler Inhale 2 puffs 4 (four) times a day      amLODIPine (NORVASC) 5 mg tablet Take 5 mg by mouth daily        ascorbic acid (VITAMIN C) 500 mg tablet Take 500 mg by mouth daily      beclomethasone (QVAR) 40 MCG/ACT inhaler Inhale 2 puffs 2 (two) times a day Rinse mouth after use   benzonatate (TESSALON PERLES) 100 mg capsule Take 100 mg by mouth 3 (three) times a day as needed for cough      Biotin 5000 MCG CAPS Take 5,000 mcg by mouth in the morning      budesonide (PULMICORT) 0 5 mg/2 mL nebulizer solution Take 0 5 mg by nebulization 2 (two) times a day Rinse mouth after use        calcium carbonate (OS-BRITTA) 600 MG tablet Take 600 mg by mouth 2 (two) times a day with meals      cholecalciferol (VITAMIN D3) 1,000 units tablet Take 1,000 Units by mouth daily      dexlansoprazole (DEXILANT) 60 MG capsule Take 60 mg by mouth daily      GUAIFENESIN 1200 PO Take 1,500 mg by mouth 2 (two) times a day      hydrochlorothiazide (HYDRODIURIL) 25 mg tablet Take 1 tablet (25 mg total) by mouth daily 90 tablet 1    ipratropium-albuterol, FOR EMS ONLY, (DUO-NEB) 0 5-2 5 mg/3 mL nebulizer solution Use 3 mL as needed        losartan (COZAAR) 100 MG tablet Take 1 tablet (100 mg total) by mouth daily 90 tablet 1    metFORMIN (GLUCOPHAGE-XR) 500 mg 24 hr tablet Take 1,000 mg by mouth daily      nitroglycerin (NITROSTAT) 0 4 mg SL tablet       OZEMPIC, 0 25 OR 0 5 MG/DOSE, 2 MG/1 5ML SOPN       potassium chloride (K-DUR,KLOR-CON) 20 mEq tablet Take 20 mEq by mouth daily        predniSONE 10 mg tablet 5 tablets QD x 3 days, 4 tablets QD x 2 days, 3 tablets QD x 1 days, 2 tablets QD x 1 days then once daily x1 days 33 tablet 0    rosuvastatin (CRESTOR) 20 MG tablet Take 20 mg by mouth daily  SYNTHROID 75 MCG tablet Take 75 mcg by mouth daily in the early morning        venlafaxine (EFFEXOR-XR) 75 mg 24 hr capsule Take 75 mg by mouth daily         No current facility-administered medications for this visit  Current Outpatient Medications on File Prior to Visit   Medication Sig    albuterol (Proventil HFA) 90 mcg/act inhaler Inhale 2 puffs 4 (four) times a day    amLODIPine (NORVASC) 5 mg tablet Take 5 mg by mouth daily      ascorbic acid (VITAMIN C) 500 mg tablet Take 500 mg by mouth daily    beclomethasone (QVAR) 40 MCG/ACT inhaler Inhale 2 puffs 2 (two) times a day Rinse mouth after use   benzonatate (TESSALON PERLES) 100 mg capsule Take 100 mg by mouth 3 (three) times a day as needed for cough    Biotin 5000 MCG CAPS Take 5,000 mcg by mouth in the morning    budesonide (PULMICORT) 0 5 mg/2 mL nebulizer solution Take 0 5 mg by nebulization 2 (two) times a day Rinse mouth after use      calcium carbonate (OS-BRITTA) 600 MG tablet Take 600 mg by mouth 2 (two) times a day with meals    cholecalciferol (VITAMIN D3) 1,000 units tablet Take 1,000 Units by mouth daily    dexlansoprazole (DEXILANT) 60 MG capsule Take 60 mg by mouth daily    GUAIFENESIN 1200 PO Take 1,500 mg by mouth 2 (two) times a day    hydrochlorothiazide (HYDRODIURIL) 25 mg tablet Take 1 tablet (25 mg total) by mouth daily    ipratropium-albuterol, FOR EMS ONLY, (DUO-NEB) 0 5-2 5 mg/3 mL nebulizer solution Use 3 mL as needed      losartan (COZAAR) 100 MG tablet Take 1 tablet (100 mg total) by mouth daily    metFORMIN (GLUCOPHAGE-XR) 500 mg 24 hr tablet Take 1,000 mg by mouth daily    nitroglycerin (NITROSTAT) 0 4 mg SL tablet     OZEMPIC, 0 25 OR 0 5 MG/DOSE, 2 MG/1 5ML SOPN     potassium chloride (K-DUR,KLOR-CON) 20 mEq tablet Take 20 mEq by mouth daily      predniSONE 10 mg tablet 5 tablets QD x 3 days, 4 tablets QD x 2 days, 3 tablets QD x 1 days, 2 tablets QD x 1 days then once daily x1 days    rosuvastatin (CRESTOR) 20 MG tablet Take 20 mg by mouth daily      SYNTHROID 75 MCG tablet Take 75 mcg by mouth daily in the early morning      venlafaxine (EFFEXOR-XR) 75 mg 24 hr capsule Take 75 mg by mouth daily       No current facility-administered medications on file prior to visit  She is allergic to epinephrine, carisoprodol, irbesartan-hydrochlorothiazide, lisinopril, other, and tizanidine       Review of Systems   Constitutional: Negative  HENT: Negative  Respiratory: Negative  Cardiovascular: Negative  Gastrointestinal: Negative  Musculoskeletal: Positive for joint swelling and myalgias  Negative for arthralgias and gait problem  Skin: Negative for color change, rash and wound  Neurological: Negative for weakness and numbness  Psychiatric/Behavioral: The patient is not nervous/anxious  Objective:      /83   Pulse 79   Ht 5' 3 5" (1 613 m) Comment: verbal  Wt 70 3 kg (155 lb)   LMP  (LMP Unknown)   BMI 27 03 kg/m²     Contains abnormal data Hemoglobin A1C  Order: [id number]   Status: Final result     Visible to patient: Yes (not seen)     Next appt: 05/16/2022 at 03:20 PM in Family Medicine [facility]     Dx: Type 2 diabetes mellitus without comp        2 Result Notes     1 Follow-up Encounter     1  Topic    Component Ref Range & Units 3/24/22  1:33 PM 1/21/21 12:00 AM   Hemoglobin A1C 4 8 - 5 6 % 7 2 High   7 2 R    Comment:          Prediabetes: 5 7 - 6 4            Diabetes: >6 4            Glycemic control for adults with diabetes: <7 0    Estimated Average Glucose mg/dL 160               Physical Exam  Vitals reviewed  Constitutional:       Appearance: She is not ill-appearing or diaphoretic  HENT:      Nose: No congestion or rhinorrhea  Cardiovascular:      Rate and Rhythm: Normal rate  Pulses: Normal pulses  no weak pulses          Dorsalis pedis pulses are 2+ on the right side and 2+ on the left side          Posterior tibial pulses are 2+ on the right side and 2+ on the left side  Pulmonary:      Effort: Pulmonary effort is normal  No respiratory distress  Musculoskeletal:      Right lower leg: No edema  Left lower leg: No edema  Right foot: Decreased range of motion (Extensor loose is longus tendon is nonfunctional nor is it palpated  ankle strength is normal)  Deformity ( extensive spurring dorsally at the metatarsal tarsometatarsal joint) and bunion present  No Charcot foot, foot drop or prominent metatarsal heads  Left foot: Normal range of motion  Bunion ( asymptomatic hallux abductovalgus bilaterally) present  No Charcot foot, foot drop or prominent metatarsal heads  Feet:      Right foot:      Protective Sensation: 10 sites tested  10 sites sensed  Skin integrity: Skin integrity normal  No ulcer, skin breakdown, erythema, warmth, callus or dry skin  Toenail Condition: Right toenails are normal       Left foot:      Protective Sensation: 10 sites tested  10 sites sensed  Skin integrity: Skin integrity normal  No ulcer, skin breakdown, erythema, warmth, callus or dry skin  Toenail Condition: Left toenails are normal    Skin:     Capillary Refill: Capillary refill takes less than 2 seconds  Findings: No erythema, lesion or rash  Neurological:      Mental Status: She is alert and oriented to person, place, and time  Sensory: No sensory deficit  Motor: Weakness ( little to no dorsiflexor strength to the right great toe due to nonfunctioning extensor hallucis longus) present  Gait: Gait normal    Psychiatric:         Mood and Affect: Mood normal              Diabetic Foot Exam    Patient's shoes and socks removed  Right Foot/Ankle   Right Foot Inspection  Skin Exam: skin normal and skin intact  No dry skin, no warmth, no callus, no erythema, no maceration, no abnormal color, no pre-ulcer, no ulcer and no callus  Toe Exam: right toe deformity   No swelling, no tenderness and erythema    Sensory   Vibration: intact  Proprioception: intact  Monofilament testing: intact    Vascular  Capillary refills: < 3 seconds  The right DP pulse is 2+  The right PT pulse is 2+  Right Toe  - Comprehensive Exam  Ecchymosis: none  Arch: normal  Hallux valgus: yes  Swelling: none   Tenderness: none         Left Foot/Ankle  Left Foot Inspection  Skin Exam: skin normal and skin intact  No dry skin, no warmth, no erythema, no maceration, normal color, no pre-ulcer, no ulcer and no callus  Toe Exam: No swelling, no tenderness, no erythema and no left toe deformity  Sensory   Vibration: intact  Proprioception: intact  Monofilament testing: intact    Vascular  Capillary refills: < 3 seconds  The left DP pulse is 2+  The left PT pulse is 2+       Left Toe  - Comprehensive Exam  Ecchymosis: none  Arch: normal  Hallux valgus: yes ( asymptomatic hallux abductovalgus bilaterally)  Swelling: none   Tenderness: none           Assign Risk Category  Deformity present  No loss of protective sensation  No weak pulses  Risk: 0

## 2022-05-13 NOTE — PROGRESS NOTES
Assessment and Plan:     Problem List Items Addressed This Visit    None     Visit Diagnoses     Medicare annual wellness visit, subsequent    -  Primary           Preventive health issues were discussed with patient, and age appropriate screening tests were ordered as noted in patient's After Visit Summary  Personalized health advice and appropriate referrals for health education or preventive services given if needed, as noted in patient's After Visit Summary       History of Present Illness:     Patient presents for Medicare Annual Wellness visit    Patient Care Team:  Noah Delgadillo DO as PCP - General (Family Medicine)  Julian Quiroz as PCP - [address] [place] [address] (RTE)     Problem List:     Patient Active Problem List   Diagnosis    GERD (gastroesophageal reflux disease)    Rectal bleeding    Cough    Abnormal levels of other serum enzymes    Cough variant asthma    Current use of proton pump inhibitor    Deformity of right foot    Dyspnea    Essential hypertension    Generalized atherosclerosis    Hyperlipidemia    Hypothyroid    Lichen planus    Major depression, recurrent ([facility] [address] [facility]    NAFLD (nonalcoholic fatty liver disease)    Osteopenia    Sarcoidosis    Solitary pulmonary nodule    Type 2 diabetes mellitus without complication ([facility] [address] )    Vitamin D deficiency    Hypogammaglobulinemia ([facility] [address] )    Rupture of extensor tendon of foot      Past Medical and Surgical History:     Past Medical History:   Diagnosis Date    Anxiety 03/29/2021    Cataract 03/29/2021    cough variant asthma     COVID-19 08/06/2021    Diabetes mellitus ([address] )     Disease of thyroid gland     Diverticulosis     colonoscopy 7/31/20    Fatty liver disease, nonalcoholic     GERD (gastroesophageal reflux disease)     Last EGD 2016    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; treated with voriconazole 200 bid x 12 weeks    Hx of migraine headaches     pt does not have    Hyperlipidemia     Hypertension     Hypogammaglobulinemia ([facility] [address] )     Pulmonary embolism (HCC)     Reactive airway disease     Restless leg syndrome     Sepsis (HCC)     Streptococcal pneumonia ([facility] )     Tubular adenoma 2020    Dr Sterling Lopez  recall 5 years     Past Surgical History:   Procedure Laterality Date    CARDIAC CATHETERIZATION      CATARACT EXTRACTION, BILATERAL      COLONOSCOPY      CORNEA LACERATION REPAIR Right     REFRACTIVE SURGERY      TONSILLECTOMY      WISDOM TOOTH EXTRACTION        Family History:     Family History   Problem Relation Age of Onset    Heart disease Mother     Heart disease Father     Colon polyps Neg Hx     Colon cancer Neg Hx       Social History:     Social History     Socioeconomic History    Marital status:      Spouse name: Not on file    Number of children: Not on file    Years of education: Not on file    Highest education level: Not on file   Occupational History    Not on file   Tobacco Use    Smoking status: Former Smoker     Packs/day: 0 25     Years: 25 00     Pack years: 6 25     Types: Cigarettes     Start date:      Quit date:      Years since quittin 3    Smokeless tobacco: Never Used   Vaping Use    Vaping Use: Never used   Substance and Sexual Activity    Alcohol use:  Yes     Alcohol/week: 14 0 standard drinks     Types: 14 Glasses of wine per week    Drug use: Not Currently    Sexual activity: Not on file   Other Topics Concern    Not on file   Social History Narrative        Lives in in-law suite with son and his family    Used to work for [employer] as a     Likes to [place] and read     Social Determinants of Health     Financial Resource Strain: Not on file   Food Insecurity: Not on file   Transportation Needs: Not on file   Physical Activity: Not on file   Stress: Not on file   Social Connections: Not on file   Intimate Partner Violence: Not on file   Housing Stability: Not on file Medications and Allergies:     Current Outpatient Medications   Medication Sig Dispense Refill    albuterol (Proventil HFA) 90 mcg/act inhaler Inhale 2 puffs 4 (four) times a day      amLODIPine (NORVASC) 5 mg tablet Take 5 mg by mouth daily        ascorbic acid (VITAMIN C) 500 mg tablet Take 500 mg by mouth daily      beclomethasone (QVAR) 40 MCG/ACT inhaler Inhale 2 puffs 2 (two) times a day Rinse mouth after use   benzonatate (TESSALON PERLES) 100 mg capsule Take 100 mg by mouth 3 (three) times a day as needed for cough      Biotin 5000 MCG CAPS Take 5,000 mcg by mouth in the morning      budesonide (PULMICORT) 0 5 mg/2 mL nebulizer solution Take 0 5 mg by nebulization 2 (two) times a day Rinse mouth after use        calcium carbonate (OS-BRITTA) 600 MG tablet Take 600 mg by mouth 2 (two) times a day with meals      cholecalciferol (VITAMIN D3) 1,000 units tablet Take 1,000 Units by mouth daily      dexlansoprazole (DEXILANT) 60 MG capsule Take 60 mg by mouth daily      GUAIFENESIN 1200 PO Take 1,500 mg by mouth 2 (two) times a day      hydrochlorothiazide (HYDRODIURIL) 25 mg tablet Take 1 tablet (25 mg total) by mouth daily 90 tablet 1    ipratropium-albuterol, FOR EMS ONLY, (DUO-NEB) 0 5-2 5 mg/3 mL nebulizer solution Use 3 mL as needed        losartan (COZAAR) 100 MG tablet Take 1 tablet (100 mg total) by mouth daily 90 tablet 1    metFORMIN (GLUCOPHAGE-XR) 500 mg 24 hr tablet Take 1,000 mg by mouth daily      nitroglycerin (NITROSTAT) 0 4 mg SL tablet       OZEMPIC, 0 25 OR 0 5 MG/DOSE, 2 MG/1 5ML SOPN       potassium chloride (K-DUR,KLOR-CON) 20 mEq tablet Take 20 mEq by mouth daily        predniSONE 10 mg tablet 5 tablets QD x 3 days, 4 tablets QD x 2 days, 3 tablets QD x 1 days, 2 tablets QD x 1 days then once daily x1 days 33 tablet 0    rosuvastatin (CRESTOR) 20 MG tablet Take 20 mg by mouth daily        SYNTHROID 75 MCG tablet Take 75 mcg by mouth daily in the early morning        venlafaxine (EFFEXOR-XR) 75 mg 24 hr capsule Take 75 mg by mouth daily         No current facility-administered medications for this visit       Allergies   Allergen Reactions    Epinephrine Shortness Of Breath and Palpitations    Carisoprodol Other (See Comments)     Other reaction(s): SOMA (2010)      Irbesartan-Hydrochlorothiazide      Possible lichen planus    Lisinopril      Lichen planus    Other Other (See Comments)     Other reaction(s): Muscle Relaxers (2014)      Tizanidine      Altered mental status      Immunizations:     Immunization History   Administered Date(s) Administered    Hep A, adult 2005    INFLUENZA 2010, 10/14/2011, 2016, 10/11/2017    Influenza Split High Dose Preservative Free IM 2019    Pneumococcal Polysaccharide PPV23 01/10/2006, 2014    Td (adult), adsorbed 2002, 10/13/2003    Tdap 10/06/2010      Health Maintenance:         Topic Date Due    Breast Cancer Screening: Mammogram  Never done    Hepatitis C Screening  2023 (Originally 1950)    Colorectal Cancer Screening  2025         Topic Date Due    COVID-19 Vaccine (1) Never done    DTaP,Tdap,and Td Vaccines (1 - Tdap) 10/06/2010    Pneumococcal Vaccine: 65+ Years (2 - PCV) 2015      Medicare Health Risk Assessment:     LMP  (LMP Unknown)      Annual Wellness Visit    Manjeet Huber DO

## 2022-05-13 NOTE — PATIENT INSTRUCTIONS
Medicare Preventive Visit Patient Instructions  Thank you for completing your Welcome to Medicare Visit or Medicare Annual Wellness Visit today  Your next wellness visit will be due in one year (5/14/2023)  The screening/preventive services that you may require over the next 5-10 years are detailed below  Some tests may not apply to you based off risk factors and/or age  Screening tests ordered at today's visit but not completed yet may show as past due  Also, please note that scanned in results may not display below  Preventive Screenings:  Service Recommendations Previous Testing/Comments   Colorectal Cancer Screening  * Colonoscopy    * Fecal Occult Blood Test (FOBT)/Fecal Immunochemical Test (FIT)  * Fecal DNA/Cologuard Test  * Flexible Sigmoidoscopy Age: 54-65 years old   Colonoscopy: every 10 years (may be performed more frequently if at higher risk)  OR  FOBT/FIT: every 1 year  OR  Cologuard: every 3 years  OR  Sigmoidoscopy: every 5 years  Screening may be recommended earlier than age 48 if at higher risk for colorectal cancer  Also, an individualized decision between you and your healthcare provider will decide whether screening between the ages of 74-80 would be appropriate  Colonoscopy: 07/31/2020  FOBT/FIT: Not on file  Cologuard: Not on file  Sigmoidoscopy: Not on file          Breast Cancer Screening Age: 36 years old  Frequency: every 1-2 years  Not required if history of left and right mastectomy Mammogram: Not on file        Cervical Cancer Screening Between the ages of 21-29, pap smear recommended once every 3 years  Between the ages of 33-67, can perform pap smear with HPV co-testing every 5 years     Recommendations may differ for women with a history of total hysterectomy, cervical cancer, or abnormal pap smears in past  Pap Smear: Not on file        Hepatitis C Screening Once for adults born between [facility]  More frequently in patients at high risk for Hepatitis C Hep C Antibody: Not on file        Diabetes Screening 1-2 times per year if you're at risk for diabetes or have pre-diabetes Fasting glucose: No results in last 5 years   A1C: 7 2 %        Cholesterol Screening Once every 5 years if you don't have a lipid disorder  May order more often based on risk factors  Lipid panel: 03/24/2022          Other Preventive Screenings Covered by Medicare:  1  Abdominal Aortic Aneurysm (AAA) Screening: covered once if your at risk  You're considered to be at risk if you have a family history of AAA  2  Lung Cancer Screening: covers low dose CT scan once per year if you meet all of the following conditions: (1) Age 50-69; (2) No signs or symptoms of lung cancer; (3) Current smoker or have quit smoking within the last 15 years; (4) You have a tobacco smoking history of at least 30 pack years (packs per day multiplied by number of years you smoked); (5) You get a written order from a healthcare provider  3  Glaucoma Screening: covered annually if you're considered high risk: (1) You have diabetes OR (2) Family history of glaucoma OR (3)  aged 48 and older OR (3)  American aged 72 and older  3  Osteoporosis Screening: covered every 2 years if you meet one of the following conditions: (1) You're estrogen deficient and at risk for osteoporosis based off medical history and other findings; (2) Have a vertebral abnormality; (3) On glucocorticoid therapy for more than 3 months; (4) Have primary hyperparathyroidism; (5) On osteoporosis medications and need to assess response to drug therapy  · Last bone density test (DXA Scan): Not on file  5  HIV Screening: covered annually if you're between the age of 12-76  Also covered annually if you are younger than 13 and older than 72 with risk factors for HIV infection  For pregnant patients, it is covered up to 3 times per pregnancy      Immunizations:  Immunization Recommendations   Influenza Vaccine Annual influenza vaccination during flu season is recommended for all persons aged >= 6 months who do not have contraindications   Pneumococcal Vaccine (Prevnar and Pneumovax)  * Prevnar = PCV13  * Pneumovax = PPSV23   Adults 25-60 years old: 1-3 doses may be recommended based on certain risk factors  Adults 72 years old: Prevnar (PCV13) vaccine recommended followed by Pneumovax (PPSV23) vaccine  If already received PPSV23 since turning 65, then PCV13 recommended at least one year after PPSV23 dose  Hepatitis B Vaccine 3 dose series if at intermediate or high risk (ex: diabetes, end stage renal disease, liver disease)   Tetanus (Td) Vaccine - COST NOT COVERED BY MEDICARE PART B Following completion of primary series, a booster dose should be given every 10 years to maintain immunity against tetanus  Td may also be given as tetanus wound prophylaxis  Tdap Vaccine - COST NOT COVERED BY MEDICARE PART B Recommended at least once for all adults  For pregnant patients, recommended with each pregnancy  Shingles Vaccine (Shingrix) - COST NOT COVERED BY MEDICARE PART B  2 shot series recommended in those aged 48 and above     Health Maintenance Due:      Topic Date Due    Breast Cancer Screening: Mammogram  Never done    Hepatitis C Screening  03/09/2023 (Originally 1950)    Colorectal Cancer Screening  07/31/2025     Immunizations Due:      Topic Date Due    COVID-19 Vaccine (1) Never done    DTaP,Tdap,and Td Vaccines (1 - Tdap) 10/06/2010    Pneumococcal Vaccine: 65+ Years (2 - PCV) 01/01/2015     Advance Directives   What are advance directives? Advance directives are legal documents that state your wishes and plans for medical care  These plans are made ahead of time in case you lose your ability to make decisions for yourself  Advance directives can apply to any medical decision, such as the treatments you want, and if you want to donate organs  What are the types of advance directives?   There are many types of advance directives, and each state has rules about how to use them  You may choose a combination of any of the following:  · Living will: This is a written record of the treatment you want  You can also choose which treatments you do not want, which to limit, and which to stop at a certain time  This includes surgery, medicine, IV fluid, and tube feedings  · Durable power of  for healthcare [facility]): This is a written record that states who you want to make healthcare choices for you when you are unable to make them for yourself  This person, called a proxy, is usually a family member or a friend  You may choose more than 1 proxy  · Do not resuscitate (DNR) order:  A DNR order is used in case your heart stops beating or you stop breathing  It is a request not to have certain forms of treatment, such as CPR  A DNR order may be included in other types of advance directives  · Medical directive: This covers the care that you want if you are in a coma, near death, or unable to make decisions for yourself  You can list the treatments you want for each condition  Treatment may include pain medicine, surgery, blood transfusions, dialysis, IV or tube feedings, and a ventilator (breathing machine)  · Values history: This document has questions about your views, beliefs, and how you feel and think about life  This information can help others choose the care that you would choose  Why are advance directives important? An advance directive helps you control your care  Although spoken wishes may be used, it is better to have your wishes written down  Spoken wishes can be misunderstood, or not followed  Treatments may be given even if you do not want them  An advance directive may make it easier for your family to make difficult choices about your care     Weight Management   Why it is important to manage your weight:  Being overweight increases your risk of health conditions such as heart disease, high blood pressure, type 2 diabetes, and certain types of cancer  It can also increase your risk for osteoarthritis, sleep apnea, and other respiratory problems  Aim for a slow, steady weight loss  Even a small amount of weight loss can lower your risk of health problems  How to lose weight safely:  A safe and healthy way to lose weight is to eat fewer calories and get regular exercise  You can lose up about 1 pound a week by decreasing the number of calories you eat by 500 calories each day  Healthy meal plan for weight management:  A healthy meal plan includes a variety of foods, contains fewer calories, and helps you stay healthy  A healthy meal plan includes the following:  · Eat whole-grain foods more often  A healthy meal plan should contain fiber  Fiber is the part of grains, fruits, and vegetables that is not broken down by your body  Whole-grain foods are healthy and provide extra fiber in your diet  Some examples of whole-grain foods are whole-wheat breads and pastas, oatmeal, brown rice, and bulgur  · Eat a variety of vegetables every day  Include dark, leafy greens such as spinach, kale, girish greens, and mustard greens  Eat yellow and orange vegetables such as carrots, sweet potatoes, and winter squash  · Eat a variety of fruits every day  Choose fresh or canned fruit (canned in its own juice or light syrup) instead of juice  Fruit juice has very little or no fiber  · Eat low-fat dairy foods  Drink fat-free (skim) milk or 1% milk  Eat fat-free yogurt and low-fat cottage cheese  Try low-fat cheeses such as mozzarella and other reduced-fat cheeses  · Choose meat and other protein foods that are low in fat  Choose beans or other legumes such as split peas or lentils  Choose fish, skinless poultry (chicken or turkey), or lean cuts of red meat (beef or pork)  Before you cook meat or poultry, cut off any visible fat  · Use less fat and oil  Try baking foods instead of frying them   Add less fat, such as margarine, sour cream, regular salad dressing and mayonnaise to foods  Eat fewer high-fat foods  Some examples of high-fat foods include french fries, doughnuts, ice cream, and cakes  · Eat fewer sweets  Limit foods and drinks that are high in sugar  This includes candy, cookies, regular soda, and sweetened drinks  Exercise:  Exercise at least 30 minutes per day on most days of the week  Some examples of exercise include walking, biking, dancing, and swimming  You can also fit in more physical activity by taking the stairs instead of the elevator or parking farther away from stores  Ask your healthcare provider about the best exercise plan for you  © Copyright [employer] 2018 Information is for End User's use only and may not be sold, redistributed or otherwise used for commercial purposes  All illustrations and images included in CareNotes® are the copyrighted property of A D A M , Inc  or [address]    10% - bad control"> 10% - bad control,Hemoglobin A1c (HbA1c) greater than 10% indicating poor diabetic control,Haemoglobin A1c greater than 10% indicating poor diabetic control">   Diabetes Mellitus Type 2 in Adults, Ambulatory Care   GENERAL INFORMATION:   Diabetes mellitus type 2  is a disease that affects how your body uses glucose (sugar)  Insulin helps move sugar out of the blood so it can be used for energy  Normally, when the blood sugar level increases, the pancreas makes more insulin  Type 2 diabetes develops because either the body cannot make enough insulin, or it cannot use the insulin correctly  After many years, your pancreas may stop making insulin  Common symptoms include the following:   · More hunger or thirst than usual     · Frequent urination     · Weight loss without trying     · Blurred vision  Seek immediate care for the following symptoms:   · Severe abdominal pain, or pain that spreads to your back  You may also be vomiting      · Trouble staying awake or focusing    · Shaking or sweating    · Blurred or double vision    · Breath has a fruity, sweet smell    · Breathing is deep and labored, or rapid and shallow    · Heartbeat is fast and weak  Treatment for diabetes mellitus type 2  includes keeping your blood sugar at a normal level  You must eat the right foods, and exercise regularly  You may also need medicine if you cannot control your blood sugar level with nutrition and exercise  Manage diabetes mellitus type 2:   · Check your blood sugar level  You will be taught how to check a small drop of blood in a glucose monitor  Ask your healthcare provider when and how often to check during the day  Ask your healthcare provider what your blood sugar levels should be when you check them  · Keep track of carbohydrates (sugar and starchy foods)  Your blood sugar level can get too high if you eat too many carbohydrates  Your dietitian will help you plan meals and snacks that have the right amount of carbohydrates  · Eat low-fat foods  Some examples are skinless chicken and low-fat milk  · Eat less sodium (salt)  Some examples of high-sodium foods to limit are soy sauce, potato chips, and soup  Do not add salt to food you cook  Limit your use of table salt  · Eat high-fiber foods  Foods that are a good source of fiber include vegetables, whole grain bread, and beans  · Limit alcohol  Alcohol affects your blood sugar level and can make it harder to manage your diabetes  Women should limit alcohol to 1 drink a day  Men should limit alcohol to 2 drinks a day  A drink of alcohol is 12 ounces of beer, 5 ounces of wine, or 1½ ounces of liquor  · Get regular exercise  Exercise can help keep your blood sugar level steady, decrease your risk of heart disease, and help you lose weight  Exercise for at least 30 minutes, 5 days a week  Include muscle strengthening activities 2 days each week  Work with your healthcare provider to create an exercise plan      · Check your feet each day  for injuries or open sores  Ask your healthcare provider for activities you can do if you have an open sore  · Quit smoking  If you smoke, it is never too late to quit  Smoking can worsen the problems that may occur with diabetes  Ask your healthcare provider for information about how to stop smoking if you are having trouble quitting  · Ask about your weight:  Ask healthcare providers if you need to lose weight, and how much to lose  Ask them to help you with a weight loss program  Even a 10 to 15 pound weight loss can help you manage your blood sugar level  · Carry medical alert identification  Wear medical alert jewelry or carry a card that says you have diabetes  Ask your healthcare provider where to get these items  · Ask about vaccines  Diabetes puts you at risk of serious illness if you get the flu, pneumonia, or hepatitis  Ask your healthcare provider if you should get a flu, pneumonia, or hepatitis B vaccine, and when to get the vaccine  Follow up with your healthcare provider as directed:  Write down your questions so you remember to ask them during your visits  CARE AGREEMENT:   You have the right to help plan your care  Learn about your health condition and how it may be treated  Discuss treatment options with your caregivers to decide what care you want to receive  You always have the right to refuse treatment  The above information is an  only  It is not intended as medical advice for individual conditions or treatments  Talk to your doctor, nurse or pharmacist before following any medical regimen to see if it is safe and effective for you  © 2014 [address] is for End User's use only and may not be sold, redistributed or otherwise used for commercial purposes  All illustrations and images included in CareNotes® are the copyrighted property of A D A M , Inc  or Qamar Gould      Medicare Preventive Visit Patient Instructions  Thank you for completing your Welcome to Medicare Visit or Medicare Annual Wellness Visit today  Your next wellness visit will be due in one year (5/17/2023)  The screening/preventive services that you may require over the next 5-10 years are detailed below  Some tests may not apply to you based off risk factors and/or age  Screening tests ordered at today's visit but not completed yet may show as past due  Also, please note that scanned in results may not display below  Preventive Screenings:  Service Recommendations Previous Testing/Comments   Colorectal Cancer Screening  * Colonoscopy    * Fecal Occult Blood Test (FOBT)/Fecal Immunochemical Test (FIT)  * Fecal DNA/Cologuard Test  * Flexible Sigmoidoscopy Age: 54-65 years old   Colonoscopy: every 10 years (may be performed more frequently if at higher risk)  OR  FOBT/FIT: every 1 year  OR  Cologuard: every 3 years  OR  Sigmoidoscopy: every 5 years  Screening may be recommended earlier than age 48 if at higher risk for colorectal cancer  Also, an individualized decision between you and your healthcare provider will decide whether screening between the ages of 74-80 would be appropriate  Colonoscopy: 07/31/2020  FOBT/FIT: Not on file  Cologuard: Not on file  Sigmoidoscopy: Not on file    Screening Current     Breast Cancer Screening Age: 36 years old  Frequency: every 1-2 years  Not required if history of left and right mastectomy Mammogram: Not on file        Cervical Cancer Screening Between the ages of 21-29, pap smear recommended once every 3 years  Between the ages of 33-67, can perform pap smear with HPV co-testing every 5 years     Recommendations may differ for women with a history of total hysterectomy, cervical cancer, or abnormal pap smears in past  Pap Smear: Not on file    Screening Not Indicated   Hepatitis C Screening Once for adults born between [facility]  More frequently in patients at high risk for Hepatitis C Hep C Antibody: Not on file    Screening Current   Diabetes Screening 1-2 times per year if you're at risk for diabetes or have pre-diabetes Fasting glucose: No results in last 5 years   A1C: 7 2 %    Screening Not Indicated  History Diabetes   Cholesterol Screening Once every 5 years if you don't have a lipid disorder  May order more often based on risk factors  Lipid panel: 03/24/2022    Screening Not Indicated  History Lipid Disorder     Other Preventive Screenings Covered by Medicare:  6  Abdominal Aortic Aneurysm (AAA) Screening: covered once if your at risk  You're considered to be at risk if you have a family history of AAA  7  Lung Cancer Screening: covers low dose CT scan once per year if you meet all of the following conditions: (1) Age 50-69; (2) No signs or symptoms of lung cancer; (3) Current smoker or have quit smoking within the last 15 years; (4) You have a tobacco smoking history of at least 30 pack years (packs per day multiplied by number of years you smoked); (5) You get a written order from a healthcare provider  8  Glaucoma Screening: covered annually if you're considered high risk: (1) You have diabetes OR (2) Family history of glaucoma OR (3)  aged 48 and older OR (3)  American aged 72 and older  5  Osteoporosis Screening: covered every 2 years if you meet one of the following conditions: (1) You're estrogen deficient and at risk for osteoporosis based off medical history and other findings; (2) Have a vertebral abnormality; (3) On glucocorticoid therapy for more than 3 months; (4) Have primary hyperparathyroidism; (5) On osteoporosis medications and need to assess response to drug therapy  · Last bone density test (DXA Scan): Not on file  10  HIV Screening: covered annually if you're between the age of 12-76  Also covered annually if you are younger than 13 and older than 72 with risk factors for HIV infection   For pregnant patients, it is covered up to 3 times per pregnancy  Immunizations:  Immunization Recommendations   Influenza Vaccine Annual influenza vaccination during flu season is recommended for all persons aged >= 6 months who do not have contraindications   Pneumococcal Vaccine (Prevnar and Pneumovax)  * Prevnar = PCV13  * Pneumovax = PPSV23   Adults 25-60 years old: 1-3 doses may be recommended based on certain risk factors  Adults 72 years old: Prevnar (PCV13) vaccine recommended followed by Pneumovax (PPSV23) vaccine  If already received PPSV23 since turning 65, then PCV13 recommended at least one year after PPSV23 dose  Hepatitis B Vaccine 3 dose series if at intermediate or high risk (ex: diabetes, end stage renal disease, liver disease)   Tetanus (Td) Vaccine - COST NOT COVERED BY MEDICARE PART B Following completion of primary series, a booster dose should be given every 10 years to maintain immunity against tetanus  Td may also be given as tetanus wound prophylaxis  Tdap Vaccine - COST NOT COVERED BY MEDICARE PART B Recommended at least once for all adults  For pregnant patients, recommended with each pregnancy  Shingles Vaccine (Shingrix) - COST NOT COVERED BY MEDICARE PART B  2 shot series recommended in those aged 48 and above     Health Maintenance Due:      Topic Date Due    Breast Cancer Screening: Mammogram  Never done    Hepatitis C Screening  03/09/2023 (Originally 1950)    Colorectal Cancer Screening  07/31/2025     Immunizations Due:      Topic Date Due    COVID-19 Vaccine (1) Never done    DTaP,Tdap,and Td Vaccines (1 - Tdap) 10/06/2010    Pneumococcal Vaccine: 65+ Years (2 - PCV) 01/01/2015     Advance Directives   What are advance directives? Advance directives are legal documents that state your wishes and plans for medical care  These plans are made ahead of time in case you lose your ability to make decisions for yourself   Advance directives can apply to any medical decision, such as the treatments you want, and if you want to donate organs  What are the types of advance directives? There are many types of advance directives, and each state has rules about how to use them  You may choose a combination of any of the following:  · Living will: This is a written record of the treatment you want  You can also choose which treatments you do not want, which to limit, and which to stop at a certain time  This includes surgery, medicine, IV fluid, and tube feedings  · Durable power of  for healthcare [facility]): This is a written record that states who you want to make healthcare choices for you when you are unable to make them for yourself  This person, called a proxy, is usually a family member or a friend  You may choose more than 1 proxy  · Do not resuscitate (DNR) order:  A DNR order is used in case your heart stops beating or you stop breathing  It is a request not to have certain forms of treatment, such as CPR  A DNR order may be included in other types of advance directives  · Medical directive: This covers the care that you want if you are in a coma, near death, or unable to make decisions for yourself  You can list the treatments you want for each condition  Treatment may include pain medicine, surgery, blood transfusions, dialysis, IV or tube feedings, and a ventilator (breathing machine)  · Values history: This document has questions about your views, beliefs, and how you feel and think about life  This information can help others choose the care that you would choose  Why are advance directives important? An advance directive helps you control your care  Although spoken wishes may be used, it is better to have your wishes written down  Spoken wishes can be misunderstood, or not followed  Treatments may be given even if you do not want them  An advance directive may make it easier for your family to make difficult choices about your care     Fall Prevention    Fall prevention  includes ways to make your home and other areas safer  It also includes ways you can move more carefully to prevent a fall  Health conditions that cause changes in your blood pressure, vision, or muscle strength and coordination may increase your risk for falls  Medicines may also increase your risk for falls if they make you dizzy, weak, or sleepy  Fall prevention tips:   · Stand or sit up slowly  · Use assistive devices as directed  · Wear shoes that fit well and have soles that   · Wear a personal alarm  · Stay active  · Manage your medical conditions  Home Safety Tips:  · Add items to prevent falls in the bathroom  · Keep paths clear  · Install bright lights in your home  · Keep items you use often on shelves within reach  · Paint or place reflective tape on the edges of your stairs  Urinary Incontinence   Urinary incontinence (UI)  is when you lose control of your bladder  UI develops because your bladder cannot store or empty urine properly  The 3 most common types of UI are stress incontinence, urge incontinence, or both  Medicines:   · May be given to help strengthen your bladder control  Report any side effects of medication to your healthcare provider  Do pelvic muscle exercises often:  Your pelvic muscles help you stop urinating  Squeeze these muscles tight for 5 seconds, then relax for 5 seconds  Gradually work up to squeezing for 10 seconds  Do 3 sets of 15 repetitions a day, or as directed  This will help strengthen your pelvic muscles and improve bladder control  Train your bladder:  Go to the bathroom at set times, such as every 2 hours, even if you do not feel the urge to go  You can also try to hold your urine when you feel the urge to go  For example, hold your urine for 5 minutes when you feel the urge to go  As that becomes easier, hold your urine for 10 minutes  Self-care:   · Keep a UI record  Write down how often you leak urine and how much you leak   Make a note of what you were doing when you leaked urine  · Drink liquids as directed  You may need to limit the amount of liquid you drink to help control your urine leakage  Do not drink any liquid right before you go to bed  Limit or do not have drinks that contain caffeine or alcohol  · Prevent constipation  Eat a variety of high-fiber foods  Good examples are high-fiber cereals, beans, vegetables, and whole-grain breads  Walking is the best way to trigger your intestines to have a bowel movement  · Exercise regularly and maintain a healthy weight  Weight loss and exercise will decrease pressure on your bladder and help you control your leakage  · Use a catheter as directed  to help empty your bladder  A catheter is a tiny, plastic tube that is put into your bladder to drain your urine  · Go to behavior therapy as directed  Behavior therapy may be used to help you learn to control your urge to urinate  Weight Management   Why it is important to manage your weight:  Being overweight increases your risk of health conditions such as heart disease, high blood pressure, type 2 diabetes, and certain types of cancer  It can also increase your risk for osteoarthritis, sleep apnea, and other respiratory problems  Aim for a slow, steady weight loss  Even a small amount of weight loss can lower your risk of health problems  How to lose weight safely:  A safe and healthy way to lose weight is to eat fewer calories and get regular exercise  You can lose up about 1 pound a week by decreasing the number of calories you eat by 500 calories each day  Healthy meal plan for weight management:  A healthy meal plan includes a variety of foods, contains fewer calories, and helps you stay healthy  A healthy meal plan includes the following:  · Eat whole-grain foods more often  A healthy meal plan should contain fiber  Fiber is the part of grains, fruits, and vegetables that is not broken down by your body   Whole-grain foods are healthy and provide extra fiber in your diet  Some examples of whole-grain foods are whole-wheat breads and pastas, oatmeal, brown rice, and bulgur  · Eat a variety of vegetables every day  Include dark, leafy greens such as spinach, kale, girish greens, and mustard greens  Eat yellow and orange vegetables such as carrots, sweet potatoes, and winter squash  · Eat a variety of fruits every day  Choose fresh or canned fruit (canned in its own juice or light syrup) instead of juice  Fruit juice has very little or no fiber  · Eat low-fat dairy foods  Drink fat-free (skim) milk or 1% milk  Eat fat-free yogurt and low-fat cottage cheese  Try low-fat cheeses such as mozzarella and other reduced-fat cheeses  · Choose meat and other protein foods that are low in fat  Choose beans or other legumes such as split peas or lentils  Choose fish, skinless poultry (chicken or turkey), or lean cuts of red meat (beef or pork)  Before you cook meat or poultry, cut off any visible fat  · Use less fat and oil  Try baking foods instead of frying them  Add less fat, such as margarine, sour cream, regular salad dressing and mayonnaise to foods  Eat fewer high-fat foods  Some examples of high-fat foods include french fries, doughnuts, ice cream, and cakes  · Eat fewer sweets  Limit foods and drinks that are high in sugar  This includes candy, cookies, regular soda, and sweetened drinks  Exercise:  Exercise at least 30 minutes per day on most days of the week  Some examples of exercise include walking, biking, dancing, and swimming  You can also fit in more physical activity by taking the stairs instead of the elevator or parking farther away from stores  Ask your healthcare provider about the best exercise plan for you  Alcohol Use and Your Health    Drinking too much can harm your health  Excessive alcohol use leads to about 88,000 death in the [place] each year, and shortens the life of those who diet by almost 30 years  Further, excessive drinking cost the economy $249 billion in 2010  Most excessive drinkers are not alcohol dependent  Excessive alcohol use has immediate effects that increase the risk of many harmful health conditions  These are most often the result of binge drinking  Over time, excessive alcohol use can lead to the development of chronic diseases and other series health problems  What is considered a "drink"? Excessive alcohol use includes:  · Binge Drinking: For women, 4 or more drinks consumed on one occasion  For men, 5 or more drinks consumed on one occasion  · Heavy Drinking: For women, 8 or more drinks per week  For men, 15 or more drinks per week  · Any alcohol used by pregnant women  · Any alcohol used by those under the age of 21 years    If you choose to drink, do so in moderation:  · Do not drink at all if you are under the age of 24, or if you are or may be pregnant, or have health problems that could be made worse by drinking    · For women, up to 1 drink per day  · For men, up to 2 drinks a day    No one should begin drinking or drink more frequently based on potential health benefits    Short-Term Health Risks:  · Injuries: motor vehicle crashes, falls, drownings, burns  · Violence: homicide, suicide, sexual assault, intimate partner violence  · Alcohol poisoning  · Reproductive health: risky sexual behaviors, unintended prengnacy, sexually transmitted diseases, miscarriage, stillbirth, fetal alcohol syndrome    Long-Term Health Risks:  · Chronic diseases: high blood pressure, heart disease, stroke, liver disease, digestive problems  · Cancers: breast, mouth and throat, liver, colon  · Learning and memory problems: dementia, poor school performance  · Mental health: depression, anxiety, insomnia  · Social problems: lost productivity, family problems, unemployment  · Alcohol dependence    For support and more information:  · [facility] [address], [place] [address]  Web Address: https://Samatoa/    · Alcoholics Anonymous        Web Address: http://www [employer]/    https://www cdc gov/alcohol/fact-sheets/alcohol-use htm     © Formerly [facility] 2018 Information is for End User's use only and may not be sold, redistributed or otherwise used for commercial purposes   All illustrations and images included in CareNotes® are the copyrighted property of A D A M , Inc  or [address] [place] [address]

## 2022-05-16 ENCOUNTER — OFFICE VISIT (OUTPATIENT)
Dept: FAMILY MEDICINE CLINIC | Facility: CLINIC | Age: 72
End: 2022-05-16
Payer: COMMERCIAL

## 2022-05-16 VITALS
WEIGHT: 157.8 LBS | HEIGHT: 64 IN | OXYGEN SATURATION: 95 % | BODY MASS INDEX: 26.94 KG/M2 | SYSTOLIC BLOOD PRESSURE: 122 MMHG | HEART RATE: 81 BPM | TEMPERATURE: 98.3 F | DIASTOLIC BLOOD PRESSURE: 68 MMHG

## 2022-05-16 DIAGNOSIS — K21.9 GASTROESOPHAGEAL REFLUX DISEASE, UNSPECIFIED WHETHER ESOPHAGITIS PRESENT: ICD-10-CM

## 2022-05-16 DIAGNOSIS — I10 ESSENTIAL HYPERTENSION: Primary | ICD-10-CM

## 2022-05-16 DIAGNOSIS — E55.9 VITAMIN D DEFICIENCY: ICD-10-CM

## 2022-05-16 DIAGNOSIS — E78.5 HYPERLIPIDEMIA, UNSPECIFIED HYPERLIPIDEMIA TYPE: ICD-10-CM

## 2022-05-16 DIAGNOSIS — Z23 ENCOUNTER FOR IMMUNIZATION: ICD-10-CM

## 2022-05-16 DIAGNOSIS — F33.9 RECURRENT MAJOR DEPRESSIVE DISORDER, REMISSION STATUS UNSPECIFIED (HCC): ICD-10-CM

## 2022-05-16 DIAGNOSIS — R53.83 FATIGUE, UNSPECIFIED TYPE: ICD-10-CM

## 2022-05-16 DIAGNOSIS — D80.1 HYPOGAMMAGLOBULINEMIA (HCC): ICD-10-CM

## 2022-05-16 DIAGNOSIS — J45.991 COUGH VARIANT ASTHMA: ICD-10-CM

## 2022-05-16 DIAGNOSIS — Z00.00 MEDICARE ANNUAL WELLNESS VISIT, SUBSEQUENT: ICD-10-CM

## 2022-05-16 DIAGNOSIS — M85.80 OSTEOPENIA, UNSPECIFIED LOCATION: ICD-10-CM

## 2022-05-16 DIAGNOSIS — E03.9 HYPOTHYROIDISM, UNSPECIFIED TYPE: ICD-10-CM

## 2022-05-16 DIAGNOSIS — E11.9 TYPE 2 DIABETES MELLITUS WITHOUT COMPLICATION, WITHOUT LONG-TERM CURRENT USE OF INSULIN (HCC): ICD-10-CM

## 2022-05-16 PROCEDURE — G0439 PPPS, SUBSEQ VISIT: HCPCS | Performed by: FAMILY MEDICINE

## 2022-05-16 PROCEDURE — 1160F RVW MEDS BY RX/DR IN RCRD: CPT | Performed by: FAMILY MEDICINE

## 2022-05-16 PROCEDURE — 1125F AMNT PAIN NOTED PAIN PRSNT: CPT | Performed by: FAMILY MEDICINE

## 2022-05-16 PROCEDURE — 3288F FALL RISK ASSESSMENT DOCD: CPT | Performed by: FAMILY MEDICINE

## 2022-05-16 PROCEDURE — 1170F FXNL STATUS ASSESSED: CPT | Performed by: FAMILY MEDICINE

## 2022-05-16 PROCEDURE — 1036F TOBACCO NON-USER: CPT | Performed by: FAMILY MEDICINE

## 2022-05-16 PROCEDURE — 3725F SCREEN DEPRESSION PERFORMED: CPT | Performed by: FAMILY MEDICINE

## 2022-05-16 PROCEDURE — 3008F BODY MASS INDEX DOCD: CPT | Performed by: FAMILY MEDICINE

## 2022-05-16 PROCEDURE — 99214 OFFICE O/P EST MOD 30 MIN: CPT | Performed by: FAMILY MEDICINE

## 2022-05-16 NOTE — PROGRESS NOTES
Assessment and Plan:     Problem List Items Addressed This Visit        Digestive    GERD (gastroesophageal reflux disease)       Endocrine    Hypothyroid    Relevant Orders    TSH, 3rd generation with Free T4 reflex    Type 2 diabetes mellitus without complication ([address] )    Relevant Orders    Hemoglobin A1C    Comprehensive metabolic panel    Lipid Panel with Direct LDL reflex    Microalbumin / creatinine urine ratio       Respiratory    Cough variant asthma       Cardiovascular and Mediastinum    Essential hypertension - Primary       Other    Hyperlipidemia    Major depression, recurrent (HCC)    Vitamin D deficiency    Hypogammaglobulinemia ([address] )      Other Visit Diagnoses     Fatigue, unspecified type               Preventive health issues were discussed with patient, and age appropriate screening tests were ordered as noted in patient's After Visit Summary  Personalized health advice and appropriate referrals for health education or preventive services given if needed, as noted in patient's After Visit Summary       History of Present Illness:     Patient presents for Medicare Annual Wellness visit    Patient Care Team:  Avril Small DO as PCP - General (Family Medicine)  Alysha Carrera as PCP - [address] [place], [place] [address]Th [facility] ([facility])     Problem List:     Patient Active Problem List   Diagnosis    GERD (gastroesophageal reflux disease)    Rectal bleeding    Cough    Abnormal levels of other serum enzymes    Cough variant asthma    Current use of proton pump inhibitor    Deformity of right foot    Dyspnea    Essential hypertension    Generalized atherosclerosis    Hyperlipidemia    Hypothyroid    Lichen planus    Major depression, recurrent ([address] )    NAFLD (nonalcoholic fatty liver disease)    Osteopenia    Sarcoidosis    Solitary pulmonary nodule    Type 2 diabetes mellitus without complication ([address] )    Vitamin D deficiency    Hypogammaglobulinemia ([address] )    Rupture of extensor tendon of foot Past Medical and Surgical History:     Past Medical History:   Diagnosis Date    Anxiety 2021    Cataract 2021    cough variant asthma     COVID-19 2021    Diabetes mellitus ([facility] )     Disease of thyroid gland     Diverticulosis     colonoscopy 20    Fatty liver disease, nonalcoholic     GERD (gastroesophageal reflux disease)     Last EGD     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; treated with voriconazole 200 bid x 12 weeks    Hx of migraine headaches     pt does not have    Hyperlipidemia     Hypertension     Hypogammaglobulinemia ([facility] )     Pulmonary embolism (HCC)     Reactive airway disease     Restless leg syndrome     Sepsis ([facility] )     Streptococcal pneumonia ([place] [id number] )     Tubular adenoma 2020    Dr Taryn Peraza  recall 5 years     Past Surgical History:   Procedure Laterality Date    CARDIAC CATHETERIZATION      CATARACT EXTRACTION, BILATERAL      COLONOSCOPY      CORNEA LACERATION REPAIR Right     REFRACTIVE SURGERY      TONSILLECTOMY      UPPER GASTROINTESTINAL ENDOSCOPY      WISDOM TOOTH EXTRACTION        Family History:     Family History   Problem Relation Age of Onset    Heart disease Mother     Heart disease Father     Stroke Sister     Heart disease Sister     Lung cancer Sister     Heart disease Brother     Colon polyps Neg Hx     Colon cancer Neg Hx       Social History:     Social History     Socioeconomic History    Marital status:      Spouse name: None    Number of children: None    Years of education: None    Highest education level: None   Occupational History    None   Tobacco Use    Smoking status: Former Smoker     Packs/day: 0 25     Years: 25 00     Pack years: 6 25     Types: Cigarettes     Start date:      Quit date:      Years since quittin 3    Smokeless tobacco: Never Used   Vaping Use    Vaping Use: Never used   Substance and Sexual Activity    Alcohol use:  Yes Alcohol/week: 14 0 standard drinks     Types: 14 Glasses of wine per week    Drug use: Not Currently    Sexual activity: Not Currently   Other Topics Concern    None   Social History Narrative        Lives in in-law suite with son and his family    Used to work for [employer] as a     Likes to Del[place] and fili     Social Determinants of Health     Financial Resource Strain: Not on file   Food Insecurity: Not on file   Transportation Needs: Not on file   Physical Activity: Not on file   Stress: Not on file   Social Connections: Not on file   Intimate Partner Violence: Not on file   Housing Stability: Not on file      Medications and Allergies:     Current Outpatient Medications   Medication Sig Dispense Refill    albuterol (PROVENTIL HFA,VENTOLIN HFA) 90 mcg/act inhaler Inhale 2 puffs 4 (four) times a day      amLODIPine (NORVASC) 5 mg tablet Take 5 mg by mouth daily        ascorbic acid (VITAMIN C) 500 mg tablet Take 500 mg by mouth daily      beclomethasone (QVAR) 40 MCG/ACT inhaler Inhale 2 puffs  in the morning and 2 puffs in the evening  Rinse mouth after use  Sonda Lips benzonatate (TESSALON PERLES) 100 mg capsule Take 100 mg by mouth 3 (three) times a day as needed for cough      Biotin 5000 MCG CAPS Take 5,000 mcg by mouth in the morning      budesonide (PULMICORT) 0 5 mg/2 mL nebulizer solution Take 0 5 mg by nebulization as needed in the morning  Rinse mouth after use   Sonda Lips calcium carbonate (OS-BRITTA) 600 MG tablet Take 600 mg by mouth 2 (two) times a day with meals      cholecalciferol (VITAMIN D3) 1,000 units tablet Take 1,000 Units by mouth daily      dexlansoprazole (DEXILANT) 60 MG capsule Take 60 mg by mouth daily      GUAIFENESIN 1200 PO Take 1,500 mg by mouth 2 (two) times a day      hydrochlorothiazide (HYDRODIURIL) 25 mg tablet Take 1 tablet (25 mg total) by mouth daily 90 tablet 1    ipratropium-albuterol, FOR EMS ONLY, (DUO-NEB) 0 5-2 5 mg/3 mL nebulizer solution Use 3 mL as needed        losartan (COZAAR) 100 MG tablet Take 1 tablet (100 mg total) by mouth daily 90 tablet 1    metFORMIN (GLUCOPHAGE-XR) 500 mg 24 hr tablet Take 1,000 mg by mouth daily      OZEMPIC, 0 25 OR 0 5 MG/DOSE, 2 MG/1 5ML SOPN       potassium chloride (K-DUR,KLOR-CON) 20 mEq tablet Take 20 mEq by mouth daily        rosuvastatin (CRESTOR) 20 MG tablet Take 20 mg by mouth daily        SYNTHROID 75 MCG tablet Take 75 mcg by mouth daily in the early morning        venlafaxine (EFFEXOR-XR) 75 mg 24 hr capsule Take 75 mg by mouth daily        nitroglycerin (NITROSTAT) 0 4 mg SL tablet  (Patient not taking: Reported on 5/16/2022)      predniSONE 10 mg tablet 5 tablets QD x 3 days, 4 tablets QD x 2 days, 3 tablets QD x 1 days, 2 tablets QD x 1 days then once daily x1 days (Patient not taking: Reported on 5/16/2022) 33 tablet 0     No current facility-administered medications for this visit       Allergies   Allergen Reactions    Epinephrine Shortness Of Breath and Palpitations    Carisoprodol Other (See Comments)     Other reaction(s): SOMA (11/04/2010)      Irbesartan-Hydrochlorothiazide Other (See Comments)     Possible lichen planus    Lisinopril      Lichen planus    Other Other (See Comments)     Other reaction(s): Muscle Relaxers (09/03/2014)      Tizanidine      Altered mental status      Immunizations:     Immunization History   Administered Date(s) Administered    Hep A, adult 03/02/2005    INFLUENZA 02/09/2010, 10/14/2011, 09/28/2016, 10/11/2017    Influenza Split High Dose Preservative Free IM 11/20/2019    Pneumococcal Polysaccharide PPV23 01/10/2006, 01/01/2014    Td (adult), adsorbed 03/08/2002, 10/13/2003    Tdap 10/06/2010      Health Maintenance:         Topic Date Due    Breast Cancer Screening: Mammogram  Never done    Hepatitis C Screening  03/09/2023 (Originally 1950)    Colorectal Cancer Screening  07/31/2025         Topic Date Due    COVID-19 Vaccine (1) Never done    DTaP,Tdap,and Td Vaccines (1 - Tdap) 10/06/2010    Pneumococcal Vaccine: 65+ Years (2 - PCV) 01/01/2015      Medicare Health Risk Assessment:     LMP  (LMP Unknown)      Last Medicare Wellness visit information reviewed, patient interviewed and updates made to the record today  Health Risk Assessment:   Patient rates overall health as very good  Patient feels that their physical health rating is same  Patient is very satisfied with their life  Eyesight was rated as slightly worse  Hearing was rated as same  Patient feels that their emotional and mental health rating is same  Patients states they are sometimes angry  Patient states they are sometimes unusually tired/fatigued  Pain experienced in the last 7 days has been none  Patient states that she has experienced no weight loss or gain in last 6 months  Depression Screening:   PHQ-9 Score: 2      Fall Risk Screening: In the past year, patient has experienced: history of falling in past year    Number of falls: 1  Injured during fall?: Yes    Feels unsteady when standing or walking?: No    Worried about falling?: No      Urinary Incontinence Screening:   Patient has leaked urine accidently in the last six months  Pt stated that it was just a little bit of leakage, not overly noticeable  PT does not need to wear a feminine napkin  Pt also commented that she is having night sweats  Home Safety:  Patient does not have trouble with stairs inside or outside of their home  Patient has working smoke alarms and has working carbon monoxide detector  Home safety hazards include: loose rugs on the floor  Nutrition:   Current diet is Regular  PT stated that she watches her sugars, but does not monitor all intake because blood glucose has been "fine "    Medications:   Patient is currently taking over-the-counter supplements  OTC medications include: see medication list  Patient is able to manage medications  Activities of Daily Living (ADLs)/Instrumental Activities of Daily Living (IADLs):   Walk and transfer into and out of bed and chair?: Yes  Dress and groom yourself?: Yes    Bathe or shower yourself?: Yes    Feed yourself? Yes  Do your laundry/housekeeping?: Yes  Manage your money, pay your bills and track your expenses?: Yes  Make your own meals?: Yes    Do your own shopping?: Yes    Previous Hospitalizations:   Any hospitalizations or ED visits within the last 12 months?: No      Advance Care Planning:   Living will: Yes    Durable POA for healthcare: Yes    Advanced directive: Yes    Advanced directive counseling given: No    Five wishes given: No    Patient declined ACP directive: No    End of Life Decisions reviewed with patient: Yes    Provider agrees with end of life decisions: Yes      Comments: Pt stated that children are aware of her wishes and have copies of her will, Advanced Directive, and POA  Cognitive Screening:   Provider or family/friend/caregiver concerned regarding cognition?: No    PREVENTIVE SCREENINGS      Cardiovascular Screening:    General: Screening Not Indicated and History Lipid Disorder      Diabetes Screening:     General: Screening Not Indicated and History Diabetes      Colorectal Cancer Screening:     General: Screening Current      Breast Cancer Screening:       Due for: Mammogram        Cervical Cancer Screening:    General: Screening Not Indicated      Osteoporosis Screening:    General: Risks and Benefits Discussed    Due for: DXA Axial      Lung Cancer Screening:     General: Screening Not Indicated      Hepatitis C Screening:    General: Screening Current    Screening, Brief Intervention, and Referral to Treatment (SBIRT)    Screening  Typical number of drinks in a day: 2  Typical number of drinks in a week: 14  Interpretation: Low risk drinking behavior      AUDIT-C Screenin) How often did you have a drink containing alcohol in the past year? 4 or more times a week  2) How many drinks did you have on a typical day when you were drinking in the past year? 1 to 2  3) How often did you have 6 or more drinks on one occasion in the past year? never    AUDIT-C Score: 4  Interpretation: Score 3-12 (female): POSITIVE screen for alcohol misuse    AUDIT Screenin) How often during the last year have you found that you were not able to stop drinking once you had started? 0 - never  5) How often during the last year have you failed to do what was normally expected from you because of drinking? 0 - never  6) How often during the last year have you needed a first drink in the morning to get yourself going after a heavy drinking session? 0 - never  7) How often during the last year have you had a feeling of guilt or remorse after drinking? 0 - never  8) How often during the last year have you been unable to remember what happened the night before because you had been drinking? 0 - never  9) Have you or someone else been injured as a result of your drinking? 0 - no  10) Has a relative or friend or a doctor or another health worker been concerned about your drinking or suggested you cut down? 0 - no    AUDIT Score: 4  Interpretation: Low risk alcohol consumption    Brief Intervention  Alcohol & drug use screenings were reviewed  No concerns regarding substance use disorder identified  Other Counseling Topics:   Car/seat belt/driving safety and calcium and vitamin D intake and regular weightbearing exercise         Constantino Brand, DO

## 2022-05-16 NOTE — PROGRESS NOTES
Assessment/Plan:    No problem-specific Assessment & Plan notes found for this encounter  Diagnoses and all orders for this visit:    Essential hypertension  bp at goal  Continue same  Type 2 diabetes mellitus without complication, without long-term current use of insulin (HCC)  -     Hemoglobin A1C; Future  -     Comprehensive metabolic panel; Future  -     Lipid Panel with Direct LDL reflex; Future  -     Microalbumin / creatinine urine ratio; Future  -     Hemoglobin A1C  -     Comprehensive metabolic panel  -     Lipid Panel with Direct LDL reflex  -     Microalbumin / creatinine urine ratio  Lab Results   Component Value Date    HGBA1C 7 2 (H) 03/24/2022     She is up to date on foot exam    Scheduled for eye exam with Dr Flor Rivera  Will have him forward copy to our office  Will repeat labs in 4 months followed by appt  Hypothyroidism, unspecified type  -     TSH, 3rd generation with Free T4 reflex; Future  -     TSH, 3rd generation with Free T4 reflex  Lab Results   Component Value Date    TSH 1 140 03/24/2022       Hyperlipidemia, unspecified hyperlipidemia type  Lab Results   Component Value Date    CHOLESTEROL 149 03/24/2022     Lab Results   Component Value Date    HDL 51 03/24/2022     Lab Results   Component Value Date    TRIG 90 03/24/2022     No results found for: Eliud  Reviewed labs  Tolerating crestor well  Recurrent major depressive disorder, remission status unspecified (HCC)  Stable  Vitamin D deficiency    Gastroesophageal reflux disease, unspecified whether esophagitis present  She is taking dexilant  Concern that she may have stricture given the isolated incident where pretzel became lodged  She will call GI for appt  Hypogammaglobulinemia (HCC)    Cough variant asthma  Stable  Has appt scheduled with pulmonology in July  Fatigue, unspecified type  This is new  Started just a couple of days ago     Lab Results   Component Value Date    WBC 10 1 03/24/2022    HGB 14 5 03/24/2022    HCT 40 7 03/24/2022    MCV 97 03/24/2022     03/24/2022       Osteopenia, unspecified location  Encouraged her to go for dexa   Medicare annual wellness visit, subsequent  See separate note      Encounter for immunization  -     Pneumococcal Conjugate Vaccine 20-valent (Pcv20)          Subjective:      Patient ID: Jace Friedman is a 66 y o  female with hypertension, hyperlipidemia, DM2, hypothyroidism, cough variant asthma, GERD, osteopenia, depression, vitamin d deficiency and hypogammaglobulinemia here today for her annual medicare wellness exam (see separate note) and for f/u on her chronic problems and review recent lab test results  She is overdue for mammogram  Has order for this  Due for her dexa  Has order for this as well  She is also in need of her diabetic eye exam  She sees Dr Tre Mcfarland and has appt scheduled some time in the next month  Will have him send copy of report to our office  Will be seeing pulmonology in July  She notes that her cough and shortness of breath that I had seen her for has resolved  Back to her baseline  Today, reports concern with "digestion"  Specifically, had issue where food got stuck while eating  Was eating a pretzel and it "got stuck" in esophagus  She tried vomiting and it finally came up after three attempts  She seems to think that she has had esophagus stretched in past  Isolated incident  Plans to followup with GI  She sees GI (Dr Kristina Lazar) and had EGD in May of 2020  Taking dexilant  Also complaining of 2 days of fatigue       HPI    The following portions of the patient's history were reviewed and updated as appropriate:   She  has a past medical history of Anxiety (03/29/2021), Cataract (03/29/2021), cough variant asthma, COVID-19 (08/06/2021), Diabetes mellitus ([facility] ), Disease of thyroid gland, Diverticulosis, Fatty liver disease, nonalcoholic, GERD (gastroesophageal reflux disease), History of pulmonary aspergillosis (2012), migraine headaches, Hyperlipidemia, Hypertension, Hypogammaglobulinemia ([facility] [address] ), Pulmonary embolism ([facility] [address] ), Reactive airway disease, Restless leg syndrome, Sepsis ([facility] [address] ), Streptococcal pneumonia ([facility] [address] ), and Tubular adenoma (07/31/2020)  She  has a past surgical history that includes [place] tooth extraction; Cornea laceration repair (Right); Tonsillectomy; Colonoscopy; Refractive surgery; Cataract extraction, bilateral; Cardiac catheterization; and Upper gastrointestinal endoscopy  She  reports that she quit smoking about 36 years ago  Her smoking use included cigarettes  She started smoking about 61 years ago  She has a 6 25 pack-year smoking history  She has never used smokeless tobacco  She reports current alcohol use of about 14 0 standard drinks of alcohol per week  She reports previous drug use  Current Outpatient Medications on File Prior to Visit   Medication Sig    albuterol (PROVENTIL HFA,VENTOLIN HFA) 90 mcg/act inhaler Inhale 2 puffs 4 (four) times a day    amLODIPine (NORVASC) 5 mg tablet Take 5 mg by mouth daily      ascorbic acid (VITAMIN C) 500 mg tablet Take 500 mg by mouth daily    beclomethasone (QVAR) 40 MCG/ACT inhaler Inhale 2 puffs  in the morning and 2 puffs in the evening  Rinse mouth after use  Aries Lopez benzonatate (TESSALON PERLES) 100 mg capsule Take 100 mg by mouth 3 (three) times a day as needed for cough    Biotin 5000 MCG CAPS Take 5,000 mcg by mouth in the morning    budesonide (PULMICORT) 0 5 mg/2 mL nebulizer solution Take 0 5 mg by nebulization as needed in the morning  Rinse mouth after use   Aries Lopez calcium carbonate (OS-BRITTA) 600 MG tablet Take 600 mg by mouth 2 (two) times a day with meals    cholecalciferol (VITAMIN D3) 1,000 units tablet Take 1,000 Units by mouth daily    dexlansoprazole (DEXILANT) 60 MG capsule Take 60 mg by mouth daily    GUAIFENESIN 1200 PO Take 1,500 mg by mouth 2 (two) times a day    hydrochlorothiazide (HYDRODIURIL) 25 mg tablet Take 1 tablet (25 mg total) by mouth daily    ipratropium-albuterol, FOR EMS ONLY, (DUO-NEB) 0 5-2 5 mg/3 mL nebulizer solution Use 3 mL as needed      losartan (COZAAR) 100 MG tablet Take 1 tablet (100 mg total) by mouth daily    metFORMIN (GLUCOPHAGE-XR) 500 mg 24 hr tablet Take 1,000 mg by mouth daily    OZEMPIC, 0 25 OR 0 5 MG/DOSE, 2 MG/1 5ML SOPN     potassium chloride (K-DUR,KLOR-CON) 20 mEq tablet Take 20 mEq by mouth daily      rosuvastatin (CRESTOR) 20 MG tablet Take 20 mg by mouth daily      SYNTHROID 75 MCG tablet Take 75 mcg by mouth daily in the early morning      venlafaxine (EFFEXOR-XR) 75 mg 24 hr capsule Take 75 mg by mouth daily      nitroglycerin (NITROSTAT) 0 4 mg SL tablet  (Patient not taking: Reported on 5/16/2022)    [DISCONTINUED] predniSONE 10 mg tablet 5 tablets QD x 3 days, 4 tablets QD x 2 days, 3 tablets QD x 1 days, 2 tablets QD x 1 days then once daily x1 days (Patient not taking: Reported on 5/16/2022)     No current facility-administered medications on file prior to visit  She is allergic to epinephrine, carisoprodol, irbesartan-hydrochlorothiazide, lisinopril, other, and tizanidine       Review of Systems      Objective:      /68 (BP Location: Left arm, Patient Position: Sitting, Cuff Size: Large)   Pulse 81   Temp 98 3 °F (36 8 °C) (Tympanic)   Ht 5' 3 5" (1 613 m)   Wt 71 6 kg (157 lb 12 8 oz)   LMP  (LMP Unknown)   SpO2 95%   BMI 27 51 kg/m²          Physical Exam  Vitals and nursing note reviewed  Constitutional:       General: She is not in acute distress  Appearance: Normal appearance  She is not ill-appearing, toxic-appearing or diaphoretic  HENT:      Head: Normocephalic and atraumatic  Right Ear: Tympanic membrane normal       Left Ear: Tympanic membrane normal       Mouth/Throat:      Mouth: Mucous membranes are moist       Pharynx: No oropharyngeal exudate or posterior oropharyngeal erythema     Eyes:      Extraocular Movements: Extraocular movements intact  Conjunctiva/sclera: Conjunctivae normal       Pupils: Pupils are equal, round, and reactive to light  Neck:      Vascular: No carotid bruit  Cardiovascular:      Rate and Rhythm: Normal rate and regular rhythm  Heart sounds: No murmur heard  Pulmonary:      Effort: Pulmonary effort is normal       Breath sounds: Normal breath sounds  Abdominal:      General: Abdomen is flat  Bowel sounds are normal  There is no distension  Palpations: Abdomen is soft  There is no mass  Tenderness: There is no abdominal tenderness  Musculoskeletal:      Cervical back: Normal range of motion and neck supple  Right lower leg: No edema  Left lower leg: No edema  Lymphadenopathy:      Cervical: No cervical adenopathy  Skin:     General: Skin is warm  Findings: No rash  Neurological:      General: No focal deficit present  Mental Status: She is alert and oriented to person, place, and time  Mental status is at baseline        Gait: Gait normal       Deep Tendon Reflexes: Reflexes normal    Psychiatric:         Mood and Affect: Mood normal

## 2022-05-17 PROCEDURE — G0009 ADMIN PNEUMOCOCCAL VACCINE: HCPCS

## 2022-05-17 PROCEDURE — 90677 PCV20 VACCINE IM: CPT

## 2022-06-02 ENCOUNTER — TELEMEDICINE (OUTPATIENT)
Dept: FAMILY MEDICINE CLINIC | Facility: CLINIC | Age: 72
End: 2022-06-02
Payer: COMMERCIAL

## 2022-06-02 VITALS
SYSTOLIC BLOOD PRESSURE: 149 MMHG | TEMPERATURE: 98.3 F | HEART RATE: 79 BPM | DIASTOLIC BLOOD PRESSURE: 84 MMHG | OXYGEN SATURATION: 97 % | BODY MASS INDEX: 26.12 KG/M2 | HEIGHT: 64 IN | WEIGHT: 153 LBS

## 2022-06-02 DIAGNOSIS — U07.1 POSITIVE SELF-ADMINISTERED ANTIGEN TEST FOR COVID-19: Primary | ICD-10-CM

## 2022-06-02 DIAGNOSIS — R09.81 NASAL CONGESTION: ICD-10-CM

## 2022-06-02 PROCEDURE — 3008F BODY MASS INDEX DOCD: CPT | Performed by: FAMILY MEDICINE

## 2022-06-02 PROCEDURE — 99213 OFFICE O/P EST LOW 20 MIN: CPT | Performed by: FAMILY MEDICINE

## 2022-06-02 PROCEDURE — 1160F RVW MEDS BY RX/DR IN RCRD: CPT | Performed by: FAMILY MEDICINE

## 2022-06-02 PROCEDURE — 3077F SYST BP >= 140 MM HG: CPT | Performed by: FAMILY MEDICINE

## 2022-06-02 PROCEDURE — 1036F TOBACCO NON-USER: CPT | Performed by: FAMILY MEDICINE

## 2022-06-02 PROCEDURE — 3079F DIAST BP 80-89 MM HG: CPT | Performed by: FAMILY MEDICINE

## 2022-06-02 NOTE — PROGRESS NOTES
COVID-19 Outpatient Progress Note    Assessment/Plan:    Problem List Items Addressed This Visit    None     Visit Diagnoses     Positive self-administered antigen test for COVID-19    -  Primary    Nasal congestion             Disposition:     Patient with positive covid -19 home antigen test 2 days ago  Today is Day #4 of her illness   She repeated home antigen test today and it was negative  Unclear if the positive test was a false positive or the negative test today was a false negative  Other possibility is that she was positive 2 days ago and is just no longer shedding as much virus/infectious therefore today's test was negative  We discussed having her come in for a PCR test today but since she just had covid infection 3/29/22 of this year, this may very well be positive since she is within #90 days of that infection  Assuming she is indeed positive, we discussed potential treatments including Paxlovid which is first line and bebtelovimab  She declines monoclonal Ab as she does not want to drive to [place]  We discussed potential side effects and interactions with paxlovid  Since she is feeling better, she is inclined to just do otc treatment such as vitamin C  Zinc and vitamin D and monitor sx  Advised her to call me tomorrow with update as we will still be within 5 days of sx onset and could still potentially receive treatment    Of note, she discarded her "positive test" from two days ago so cannot upload into chart  I have spent 14 minutes directly with the patient  Encounter provider William Kothari DO    Provider located at [address] [id number]    Recent Visits  No visits were found meeting these conditions    Showing recent visits within past 7 days and meeting all other requirements  Today's Visits  Date Type Provider Dept   06/02/22 Telemedicine William Kothari DO [facility]   Showing today's visits and meeting all other requirements  Future Appointments  No visits were found meeting these conditions  Showing future appointments within next 150 days and meeting all other requirements     This virtual check-in was done via [address] and patient was informed that this is a secure, HIPAA-compliant platform  She agrees to proceed  Patient agrees to participate in a virtual check in via telephone or video visit instead of presenting to the office to address urgent/immediate medical needs  Patient is aware this is a billable service  After connecting through [facility], the patient was identified by name and date of birth  Charisse Landaverde was informed that this was a telemedicine visit and that the exam was being conducted confidentially over secure lines  My office door was closed  No one else was in the room  Charisse Landaverde acknowledged consent and understanding of privacy and security of the telemedicine visit  I informed the patient that I have reviewed her record in Epic and presented the opportunity for her to ask any questions regarding the visit today  The patient agreed to participate  Verification of patient location:  Patient is located in the following state in which I hold an active license: [place]    Subjective:   Charisse Landaverde is a 66 y o  female who is concerned about COVID-19  Patient's symptoms include fatigue and nasal congestion  Patient denies fever, chills, malaise, rhinorrhea, sore throat, anosmia, loss of taste, cough, shortness of breath, chest tightness, abdominal pain, nausea, vomiting, diarrhea, myalgias and headaches       - Date of symptom onset: 5/30/2022      COVID-19 vaccination status: Not vaccinated    Exposure:   Contact with a person who is under investigation (PUI) for or who is positive for COVID-19 within the last 14 days?: Yes    Hospitalized recently for fever and/or lower respiratory symptoms?: No      Currently a healthcare worker that is involved in direct patient care?: No      Works in a special setting where the risk of COVID-19 transmission may be high? (this may include long-term care, correctional and group home facilities; homeless shelters; assisted-living facilities and group homes ): No      Resident in a special setting where the risk of COVID-19 transmission may be high? (this may include long-term care, correctional and group home facilities; homeless shelters; assisted-living facilities and group homes ): No      Patient had covid infection in march of 2021 and again in march of 2022  Treated with molnupiravir in march of this year  Was exposed to someone (daughter) 6 days ago who was positive for COVID-19  Started with nasal congestion and fatigue on 5/30/22  Did home test on 5/31/22 (day #2 of illness) after she developed a low grade fever (100 6) which resulted as positive  Is feeling much better today except for the nasal congestion and fatigue  She denies any fever, chills, bodyaches  Denies any cough, shortness of breath, loss of sense of smell or taste and denies any GI symptoms       She did repeat her covid home antigen test today and it was negative; today is day #4 of illness        Lab Results   Component Value Date    SARSCOV2 DETECTED (A) 03/17/2021    SARSCOVAG Positive (A) 03/29/2022     Past Medical History:   Diagnosis Date    Anxiety 03/29/2021    Cataract 03/29/2021    cough variant asthma     COVID-19 03/06/2021    had again 3/29/22 (+antigen test)    Diabetes mellitus ([address] )     Disease of thyroid gland     Diverticulosis     colonoscopy 7/31/20    Fatty liver disease, nonalcoholic     GERD (gastroesophageal reflux disease)     Last EGD 2016    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; treated with voriconazole 200 bid x 12 weeks    Hx of migraine headaches     pt does not have    Hyperlipidemia     Hypertension     Hypogammaglobulinemia ([address] )     Pulmonary embolism ([facility] [address] )     Reactive airway disease     Restless leg syndrome     [facility] ([facility] )     Streptococcal pneumonia ([facility] )     Tubular adenoma 07/31/2020    Dr Daniele Elias  recall 5 years     Past Surgical History:   Procedure Laterality Date    CARDIAC CATHETERIZATION      CATARACT EXTRACTION, BILATERAL      COLONOSCOPY      CORNEA LACERATION REPAIR Right     REFRACTIVE SURGERY      TONSILLECTOMY      UPPER GASTROINTESTINAL ENDOSCOPY      WISDOM TOOTH EXTRACTION       Current Outpatient Medications   Medication Sig Dispense Refill    albuterol (PROVENTIL HFA,VENTOLIN HFA) 90 mcg/act inhaler Inhale 2 puffs 4 (four) times a day      amLODIPine (NORVASC) 5 mg tablet Take 5 mg by mouth daily        ascorbic acid (VITAMIN C) 500 mg tablet Take 500 mg by mouth daily      beclomethasone (QVAR) 40 MCG/ACT inhaler Inhale 2 puffs  in the morning and 2 puffs in the evening  Rinse mouth after use  Adonica Bran Biotin 5000 MCG CAPS Take 5,000 mcg by mouth in the morning      budesonide (PULMICORT) 0 5 mg/2 mL nebulizer solution Take 0 5 mg by nebulization as needed in the morning  Rinse mouth after use   Adonica Bran calcium carbonate (OS-BRITTA) 600 MG tablet Take 600 mg by mouth 2 (two) times a day with meals      cholecalciferol (VITAMIN D3) 1,000 units tablet Take 1,000 Units by mouth daily      dexlansoprazole (DEXILANT) 60 MG capsule Take 60 mg by mouth daily      GUAIFENESIN 1200 PO Take 1,500 mg by mouth 2 (two) times a day      hydrochlorothiazide (HYDRODIURIL) 25 mg tablet Take 1 tablet (25 mg total) by mouth daily 90 tablet 1    ipratropium-albuterol, FOR EMS ONLY, (DUO-NEB) 0 5-2 5 mg/3 mL nebulizer solution Use 3 mL as needed        losartan (COZAAR) 100 MG tablet Take 1 tablet (100 mg total) by mouth daily 90 tablet 1    metFORMIN (GLUCOPHAGE-XR) 500 mg 24 hr tablet Take 1,000 mg by mouth daily      OZEMPIC, 0 25 OR 0 5 MG/DOSE, 2 MG/1 5ML SOPN       potassium chloride (K-DUR,KLOR-CON) 20 mEq tablet Take 20 mEq by mouth daily        rosuvastatin (CRESTOR) 20 MG tablet Take 20 mg by mouth daily        SYNTHROID 75 MCG tablet Take 75 mcg by mouth daily in the early morning        venlafaxine (EFFEXOR-XR) 75 mg 24 hr capsule Take 75 mg by mouth daily        benzonatate (TESSALON PERLES) 100 mg capsule Take 100 mg by mouth 3 (three) times a day as needed for cough (Patient not taking: Reported on 6/2/2022)      nitroglycerin (NITROSTAT) 0 4 mg SL tablet  (Patient not taking: Reported on 5/16/2022)       No current facility-administered medications for this visit  Allergies   Allergen Reactions    Epinephrine Shortness Of Breath and Palpitations    Carisoprodol Other (See Comments)     Other reaction(s): SOMA (11/04/2010)      Irbesartan-Hydrochlorothiazide Other (See Comments)     Possible lichen planus    Lisinopril      Lichen planus    Other Other (See Comments)     Other reaction(s): Muscle Relaxers (09/03/2014)      Tizanidine      Altered mental status       Review of Systems   Constitutional: Positive for fatigue  Negative for chills and fever  HENT: Positive for congestion  Negative for rhinorrhea and sore throat  Respiratory: Negative for cough, chest tightness and shortness of breath  Gastrointestinal: Negative for abdominal pain, diarrhea, nausea and vomiting  Musculoskeletal: Negative for myalgias  Neurological: Negative for headaches  Objective:    Vitals:    06/02/22 1406   BP: 149/84   BP Location: Left arm   Patient Position: Sitting   Cuff Size: Adult   Pulse: 79   Temp: 98 3 °F (36 8 °C)   TempSrc: Tympanic   SpO2: 97%   Weight: 69 4 kg (153 lb)   Height: 5' 3 5" (1 613 m)       Physical Exam  Vitals and nursing note reviewed  Constitutional:       General: She is not in acute distress  Appearance: She is not ill-appearing, toxic-appearing or diaphoretic  HENT:      Head: Normocephalic     Eyes:      Conjunctiva/sclera: Conjunctivae normal    Pulmonary: Effort: Pulmonary effort is normal  No respiratory distress  Neurological:      Mental Status: She is alert  Psychiatric:         Mood and Affect: Mood normal          VIRTUAL VISIT DISCLAIMER    Jakob Wilson verbally agrees to participate in [employer]  Pt is aware that [employer] could be limited without vital signs or the ability to perform a full hands-on physical Lesahra Becerra understands she or the provider may request at any time to terminate the video visit and request the patient to seek care or treatment in person

## 2022-06-09 ENCOUNTER — RA CDI HCC (OUTPATIENT)
Dept: OTHER | Facility: HOSPITAL | Age: 72
End: 2022-06-09

## 2022-06-09 NOTE — PROGRESS NOTES
[address] [facility]    Sarcoidosis    No documentation supports further classification of the dx

## 2022-09-06 ENCOUNTER — VBI (OUTPATIENT)
Dept: ADMINISTRATIVE | Facility: OTHER | Age: 72
End: 2022-09-06

## 2022-09-21 ENCOUNTER — VBI (OUTPATIENT)
Dept: ADMINISTRATIVE | Facility: OTHER | Age: 72
End: 2022-09-21

## 2022-09-22 ENCOUNTER — TELEPHONE (OUTPATIENT)
Dept: FAMILY MEDICINE CLINIC | Facility: CLINIC | Age: 72
End: 2022-09-22

## 2022-09-22 NOTE — TELEPHONE ENCOUNTER
I attempted to contact pt via telephone to remind her to complete active blood work orders  I could not leave VM, as pt's voice mailbox was full  [employer] message also sent to patient

## 2022-09-26 DIAGNOSIS — I10 ESSENTIAL HYPERTENSION: ICD-10-CM

## 2022-09-26 RX ORDER — HYDROCHLOROTHIAZIDE 25 MG/1
TABLET ORAL
Qty: 90 TABLET | Refills: 3 | Status: SHIPPED | OUTPATIENT
Start: 2022-09-26

## 2022-10-11 PROBLEM — R05.9 COUGH: Status: RESOLVED | Noted: 2020-10-15 | Resolved: 2022-10-11

## 2022-10-19 ENCOUNTER — TELEPHONE (OUTPATIENT)
Dept: FAMILY MEDICINE CLINIC | Facility: CLINIC | Age: 72
End: 2022-10-19

## 2022-10-19 NOTE — TELEPHONE ENCOUNTER
ACTIVE MAMMO ORDER -    Patient reminder card mailed to patient's home  [employer] message also sent

## 2022-10-24 DIAGNOSIS — I10 ESSENTIAL HYPERTENSION: ICD-10-CM

## 2022-10-24 RX ORDER — LOSARTAN POTASSIUM 100 MG/1
TABLET ORAL
Qty: 90 TABLET | Refills: 3 | Status: SHIPPED | OUTPATIENT
Start: 2022-10-24

## 2022-10-27 ENCOUNTER — VBI (OUTPATIENT)
Dept: ADMINISTRATIVE | Facility: OTHER | Age: 72
End: 2022-10-27

## 2022-11-02 ENCOUNTER — OFFICE VISIT (OUTPATIENT)
Dept: FAMILY MEDICINE CLINIC | Facility: CLINIC | Age: 72
End: 2022-11-02

## 2022-11-02 VITALS
WEIGHT: 157 LBS | DIASTOLIC BLOOD PRESSURE: 64 MMHG | HEIGHT: 64 IN | HEART RATE: 72 BPM | BODY MASS INDEX: 26.8 KG/M2 | OXYGEN SATURATION: 96 % | SYSTOLIC BLOOD PRESSURE: 120 MMHG | TEMPERATURE: 97.4 F

## 2022-11-02 DIAGNOSIS — R39.15 URGENCY OF URINATION: Primary | ICD-10-CM

## 2022-11-02 DIAGNOSIS — R31.0 GROSS HEMATURIA: ICD-10-CM

## 2022-11-02 DIAGNOSIS — N30.01 ACUTE CYSTITIS WITH HEMATURIA: ICD-10-CM

## 2022-11-02 DIAGNOSIS — R35.0 URINARY FREQUENCY: ICD-10-CM

## 2022-11-02 LAB
SL AMB  POCT GLUCOSE, UA: NEGATIVE
SL AMB LEUKOCYTE ESTERASE,UA: ABNORMAL
SL AMB POCT BILIRUBIN,UA: NEGATIVE
SL AMB POCT BLOOD,UA: ABNORMAL
SL AMB POCT CLARITY,UA: ABNORMAL
SL AMB POCT COLOR,UA: ABNORMAL
SL AMB POCT KETONES,UA: NEGATIVE
SL AMB POCT NITRITE,UA: POSITIVE
SL AMB POCT PH,UA: 5.5
SL AMB POCT SPECIFIC GRAVITY,UA: 1.01
SL AMB POCT URINE PROTEIN: ABNORMAL
SL AMB POCT UROBILINOGEN: ABNORMAL

## 2022-11-02 RX ORDER — NITROFURANTOIN 25; 75 MG/1; MG/1
100 CAPSULE ORAL 2 TIMES DAILY
Qty: 10 CAPSULE | Refills: 0 | Status: SHIPPED | OUTPATIENT
Start: 2022-11-02 | End: 2022-11-07

## 2022-11-02 RX ORDER — PHENAZOPYRIDINE HYDROCHLORIDE 100 MG/1
100 TABLET, FILM COATED ORAL 3 TIMES DAILY PRN
Qty: 10 TABLET | Refills: 0 | Status: SHIPPED | OUTPATIENT
Start: 2022-11-02

## 2022-11-02 NOTE — PATIENT INSTRUCTIONS
Urinary Tract Infection in Older Adults   WHAT YOU NEED TO KNOW:   A urinary tract infection (UTI) is caused by bacteria that get inside your urinary tract  Your urinary tract includes your kidneys, ureters, bladder, and urethra  Urine is made in your kidneys, and it flows from the ureters to the bladder  Urine leaves the bladder through the urethra  A UTI is more common in your lower urinary tract, which includes your bladder and urethra  DISCHARGE INSTRUCTIONS:   Return to the emergency department if:   You are urinating very little or not at all  You are vomiting  You have a high fever with shaking chills  You have side or back pain that gets worse  Call your doctor if:   You have a fever  You are a woman and you have increased white or yellow discharge from your vagina  You do not feel better after 2 days of taking antibiotics  You have questions or concerns about your condition or care  Medicines:   Medicines  help treat the bacterial infection or decrease pain and burning when you urinate  You may also need medicines to decrease the urge to urinate often  If you have UTIs often (called recurrent UTIs), you may be given antibiotics to take regularly  You will be given directions for when and how to use antibiotics  The goal is to prevent UTIs but not cause antibiotic resistance by using antibiotics too often  Take your medicine as directed  Contact your healthcare provider if you think your medicine is not helping or if you have side effects  Tell him or her if you are allergic to any medicine  Keep a list of the medicines, vitamins, and herbs you take  Include the amounts, and when and why you take them  Bring the list or the pill bottles to follow-up visits  Carry your medicine list with you in case of an emergency  Self-care:   Drink liquids as directed  Liquids can help flush bacteria from your urinary tract   Ask how much liquid to drink each day and which liquids are best for you  You may need to drink more liquids than usual to help flush out the bacteria  Do not drink alcohol, caffeine, and citrus juices  These can irritate your bladder and increase your symptoms  Apply heat  on your abdomen for 20 to 30 minutes every 2 hours for as many days as directed  Heat helps decrease discomfort and pressure in your bladder  Prevent a UTI:   Urinate when you feel the urge  Do not hold your urine  Bacteria can grow if urine stays in the bladder too long  It may be helpful to urinate at least every 3 to 4 hours  Urinate after you have sex  to flush away bacteria that can enter your urinary tract during sex  Wear cotton underwear and clothes that are loose  Tight pants and nylon underwear can trap moisture and cause bacteria to grow  Cranberry juice or cranberry supplements  may help prevent UTIs  Your healthcare provider can recommend the right juice or supplement for you  Women should wipe front to back  after urinating or having a bowel movement  This may prevent germs from getting into the urinary tract  Do not douche or use feminine deodorants  These can change the chemical balance in your vagina  You may also be given vaginal estrogen medicine  This medicine helps prevent recurrent UTIs in women who have gone through menopause or are in riley-menopause  Follow up with your doctor as directed:  Write down your questions so you remember to ask them during your visits  © Copyright [employer] 2022 Information is for End User's use only and may not be sold, redistributed or otherwise used for commercial purposes  All illustrations and images included in CareNotes® are the copyrighted property of A D A M , Inc  or [facility] Nik Christianson   The above information is an  only  It is not intended as medical advice for individual conditions or treatments   Talk to your doctor, nurse or pharmacist before following any medical regimen to see if it is safe and effective for you

## 2022-11-02 NOTE — PROGRESS NOTES
Name: Judith Sutherland      : 1950      MRN: [id number]  Encounter Provider: Evita Carty DO  Encounter Date: 2022   Encounter department: [address] [place] [address]     1  Urgency of urination  -     POCT urine dip  -     Urine culture; Future    2  Urinary frequency  -     POCT urine dip  -     Urine culture; Future    3  Gross hematuria  -     POCT urine dip  -     Urine culture; Future    4  Acute cystitis with hematuria  -     Urine culture; Future  -     nitrofurantoin (MACROBID) 100 mg capsule; Take 1 capsule (100 mg total) by mouth 2 (two) times a day for 5 days  -     phenazopyridine (PYRIDIUM) 100 mg tablet; Take 1 tablet (100 mg total) by mouth 3 (three) times a day as needed for bladder spasms    Urine dip in office today showed large blood,large leukocytes and nitrates  Will send for culture  Treat empirically with macrobid bid x 5 days  rx for pyridium for the dysuria  Will call with culture results  Subjective     HPI   Patient is a 67year old female with hypertension, hyperlipidemia, DM2, hypothyroidism, GERD, NAFLD, depression, vitamin d deficiency, sarcoidosis and cough variant asthma who is being seen today for "suspected UTI"  She complains of urinary frequency, dysuria and urgency  She has seen blood in urine  + back pain in bilateral flank  No fever or chills  No nausea or vomiting  Symptoms began 2 days ago  She has upcoming visit scheduled on 22  Reminded to get labs done prior to that OV as well as mammogram and dexa which were ordered in the spring and have not been completed yet       Review of Systems    Past Medical History:   Diagnosis Date   • Anxiety 2021   • Cataract 2021   • cough variant asthma    • COVID-19 2021    had again 3/29/22 (+antigen test)   • Diabetes mellitus ([facility] [address] )    • Disease of thyroid gland    • Diverticulosis     colonoscopy 20   • Fatty liver disease, nonalcoholic    • GERD (gastroesophageal reflux disease)     Last EGD    • 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; treated with voriconazole 200 bid x 12 weeks   • Hx of migraine headaches     pt does not have   • Hyperlipidemia    • Hypertension    • Hypogammaglobulinemia ([facility] [address] )    • Pulmonary embolism (HCC)    • Reactive airway disease    • Restless leg syndrome    • Sepsis ([facility] [address] )    • [facility]    • Tubular adenoma 2020    Dr Say Metzger  recall 5 years     Past Surgical History:   Procedure Laterality Date   • CARDIAC CATHETERIZATION     • CATARACT EXTRACTION, BILATERAL     • COLONOSCOPY     • CORNEA LACERATION REPAIR Right    • REFRACTIVE SURGERY     • TONSILLECTOMY     • UPPER GASTROINTESTINAL ENDOSCOPY     • WISDOM TOOTH EXTRACTION       Family History   Problem Relation Age of Onset   • Heart disease Mother    • Heart disease Father    • Stroke Sister    • Heart disease Sister    • Lung cancer Sister    • Heart disease Brother    • Colon polyps Neg Hx    • Colon cancer Neg Hx      Social History     Socioeconomic History   • Marital status:      Spouse name: None   • Number of children: None   • Years of education: None   • Highest education level: None   Occupational History   • None   Tobacco Use   • Smoking status: Former Smoker     Packs/day: 0 25     Years: 25 00     Pack years: 6 25     Types: Cigarettes     Start date:      Quit date:      Years since quittin 8   • Smokeless tobacco: Never Used   Vaping Use   • Vaping Use: Never used   Substance and Sexual Activity   • Alcohol use:  Yes     Alcohol/week: 14 0 standard drinks     Types: 14 Glasses of wine per week   • Drug use: Not Currently   • Sexual activity: Not Currently   Other Topics Concern   • None   Social History Narrative        Lives in in-law suite with son and his family    Used to work for [employer] as a     Likes to [place] and read     Social Determinants of Health     Financial Resource Strain: Not on file   Food Insecurity: Not on file   Transportation Needs: Not on file   Physical Activity: Not on file   Stress: Not on file   Social Connections: Not on file   Intimate Partner Violence: Not on file   Housing Stability: Not on file     Current Outpatient Medications on File Prior to Visit   Medication Sig   • albuterol (PROVENTIL HFA,VENTOLIN HFA) 90 mcg/act inhaler Inhale 2 puffs 4 (four) times a day   • amLODIPine (NORVASC) 5 mg tablet Take 5 mg by mouth daily     • ascorbic acid (VITAMIN C) 500 mg tablet Take 500 mg by mouth daily   • beclomethasone (QVAR) 40 MCG/ACT inhaler Inhale 2 puffs  in the morning and 2 puffs in the evening  Rinse mouth after use      • benzonatate (TESSALON PERLES) 100 mg capsule Take 100 mg by mouth 3 (three) times a day as needed for cough   • Biotin 5000 MCG CAPS Take 5,000 mcg by mouth in the morning   • budesonide (PULMICORT) 0 5 mg/2 mL nebulizer solution Take 0 5 mg by nebulization as needed in the morning  Rinse mouth after use       • calcium carbonate (OS-BRITTA) 600 MG tablet Take 600 mg by mouth 2 (two) times a day with meals   • cholecalciferol (VITAMIN D3) 1,000 units tablet Take 1,000 Units by mouth daily   • dexlansoprazole (DEXILANT) 60 MG capsule Take 60 mg by mouth daily   • GUAIFENESIN 1200 PO Take 1,500 mg by mouth 2 (two) times a day   • hydrochlorothiazide (HYDRODIURIL) 25 mg tablet TAKE 1 TABLET DAILY   • ipratropium-albuterol, FOR EMS ONLY, (DUO-NEB) 0 5-2 5 mg/3 mL nebulizer solution Use 3 mL as needed     • losartan (COZAAR) 100 MG tablet TAKE 1 TABLET DAILY   • metFORMIN (GLUCOPHAGE-XR) 500 mg 24 hr tablet Take 1,000 mg by mouth daily   • OZEMPIC, 0 25 OR 0 5 MG/DOSE, 2 MG/1 5ML SOPN    • potassium chloride (K-DUR,KLOR-CON) 20 mEq tablet Take 20 mEq by mouth daily     • rosuvastatin (CRESTOR) 20 MG tablet Take 20 mg by mouth daily     • SYNTHROID 75 MCG tablet Take 75 mcg by mouth daily in the early morning     • venlafaxine (EFFEXOR-XR) 75 mg 24 hr capsule Take 75 mg by mouth daily     • nitroglycerin (NITROSTAT) 0 4 mg SL tablet  (Patient not taking: No sig reported)     Allergies   Allergen Reactions   • Epinephrine Shortness Of Breath and Palpitations   • Carisoprodol Other (See Comments)     Other reaction(s): SOMA (11/04/2010)     • Irbesartan-Hydrochlorothiazide Other (See Comments)     Possible lichen planus   • Lisinopril      Lichen planus   • Other Other (See Comments)     Other reaction(s): Muscle Relaxers (09/03/2014)     • Tizanidine      Altered mental status     Immunization History   Administered Date(s) Administered   • Hep A, adult 03/02/2005   • INFLUENZA 02/09/2010, 10/14/2011, 09/28/2016, 10/11/2017   • Influenza Split High Dose Preservative Free IM 11/20/2019   • Pneumococcal Conjugate Vaccine 20-valent (Pcv20), Polysace 05/17/2022   • Pneumococcal Polysaccharide PPV23 01/10/2006, 01/01/2014   • Td (adult), adsorbed 03/08/2002, 10/13/2003   • Tdap 10/06/2010       Objective     /64 (BP Location: Left arm, Patient Position: Sitting, Cuff Size: Large)   Pulse 72   Temp (!) 97 4 °F (36 3 °C) (Tympanic)   Ht 5' 4" (1 626 m)   Wt 71 2 kg (157 lb)   LMP  (LMP Unknown)   SpO2 96%   BMI 26 95 kg/m²     Physical Exam  Vitals and nursing note reviewed  Constitutional:       General: She is not in acute distress  Appearance: Normal appearance  She is not ill-appearing, toxic-appearing or diaphoretic  HENT:      Head: Normocephalic and atraumatic  Cardiovascular:      Rate and Rhythm: Normal rate and regular rhythm  Heart sounds: No murmur heard  Pulmonary:      Effort: Pulmonary effort is normal       Breath sounds: Normal breath sounds  Abdominal:      General: Abdomen is flat  Bowel sounds are normal  There is no distension  Palpations: Abdomen is soft  There is no mass  Tenderness: There is abdominal tenderness (suprapubic tenderness)   There is no right CVA tenderness, left CVA tenderness, guarding or rebound  Musculoskeletal:      Cervical back: Normal range of motion and neck supple  Right lower leg: No edema  Left lower leg: No edema  Lymphadenopathy:      Cervical: No cervical adenopathy  Neurological:      Mental Status: She is alert     Psychiatric:         Mood and Affect: Mood normal        Andie Thompson DO

## 2022-11-08 ENCOUNTER — TELEPHONE (OUTPATIENT)
Dept: FAMILY MEDICINE CLINIC | Facility: CLINIC | Age: 72
End: 2022-11-08

## 2022-11-08 DIAGNOSIS — N39.0 URINARY TRACT INFECTION WITH HEMATURIA, SITE UNSPECIFIED: Primary | ICD-10-CM

## 2022-11-08 DIAGNOSIS — R31.9 URINARY TRACT INFECTION WITH HEMATURIA, SITE UNSPECIFIED: Primary | ICD-10-CM

## 2022-11-08 LAB
BACTERIA UR CULT: ABNORMAL
Lab: ABNORMAL
Lab: ABNORMAL
SL AMB ANTIMICROBIAL SUSCEPTIBILITY: ABNORMAL

## 2022-11-08 RX ORDER — CIPROFLOXACIN 500 MG/1
500 TABLET, FILM COATED ORAL EVERY 12 HOURS SCHEDULED
Qty: 14 TABLET | Refills: 0 | Status: SHIPPED | OUTPATIENT
Start: 2022-11-08 | End: 2022-11-15

## 2022-11-08 NOTE — TELEPHONE ENCOUNTER
DM EYE EXAM-    Pt aware it is overdue and she is scheduled for DM EYE EXAM in 11/2022  Patient requests that a hard copy of the form be mailed to her home

## 2022-11-08 NOTE — TELEPHONE ENCOUNTER
ADVICE ONLY-    Pt is aware to complete new antibiotic course and then follow-up with Dr Rohan Tadeo  Pt is already scheduled for DM check-up on 11/17/2022  Pt wishes to address the sx at that time

## 2022-11-08 NOTE — TELEPHONE ENCOUNTER
RESULTS -    Patient is aware of her results and advice  ADVICE ONLY-    Patient states that she is on her last pill tonight of her antibiotic regimen, yet the sx persist (incomplete and difficult voiding, pain in the lower back, overall discomfort)  Patient wishes for Dr Ernestina Lisa to advise regarding next steps

## 2022-11-08 NOTE — TELEPHONE ENCOUNTER
DM EYE EXAM -    I left a VM advising patient to call the office, as there is a question for her  Please advise patient that she is overdue for DM Eye Exam, was it completed? If so, can she have the eye doctor's office complete the form and fax it to us?

## 2022-11-08 NOTE — TELEPHONE ENCOUNTER
Will send rx to pharmacy for different antibiotic  She should make f/u appt after competing antibiotics for recheck of her sx  Thanks

## 2022-11-08 NOTE — TELEPHONE ENCOUNTER
----- Message from Julia Castillo DO sent at 11/8/2022 10:42 AM EST -----  Urine culture was positive for infection  The infection is sensitive to the antibiotic that was prescribed  Should finish as prescribed

## 2022-11-16 ENCOUNTER — TELEPHONE (OUTPATIENT)
Dept: FAMILY MEDICINE CLINIC | Facility: CLINIC | Age: 72
End: 2022-11-16

## 2022-11-16 NOTE — TELEPHONE ENCOUNTER
"              After Visit Summary   9/19/2017    Kat Rodriguez    MRN: [id number]           Patient Information     Date Of Birth          1999        Visit Information        Provider Department      9/19/2017 1:00 PM Ana Maria Christian PA-C [facility]      Today's Diagnoses     Morbid obesity with BMI of 40.0-44.9, adult (H)    -  1    Screening for iron deficiency anemia        Screening for endocrine, nutritional, metabolic and immunity disorder        Amenorrhea        Chronic pain of right knee        Mild intermittent asthma without complication        S/P ACL reconstruction          Care Instructions    Please refer to your task list created today at your appointment.    Bariatric Task List  Weight loss goal: Lose 5 lb prior to surgery. Goal Weight 235.2#  Achieve clearance from dietitian to see surgeon.  Have preoperative laboratory tests drawn.  Psychological Evaluation with MMPI and clearance for weight loss surgery.    Make appointment to return to clinic in 1 month to see the dietician.                Follow-ups after your visit        Additional Services     NUTRITION REFERRAL       Type of nutrition instruction needed: Weight Loss  Your provider has referred you to:     [facility]            PSYCHOLOGY REFERRAL       You will need to undergo Bariatric Psychological Assessment.  Below is a list of preferred bariatric psychologists.    When you call the psychologist's office, please specify you need a \"screening psychological assessment for bariatric surgery.\" This includes a bariatric surgery evaluation and MMPI.  The report should be mailed /faxed to our office at [phone]. Please see psychologist list given to you in your initial packet.                    Follow-up notes from your care team     Return in 1 month (on 10/19/2017).      Future tests that were ordered for you today " I left a detailed VM advising patient to complete incomplete labs fasted  Pt also reminded of tomorrow's OV with Dr Dolores Anders 11/17/2022 for DM check-up  "    Open Future Orders        Priority Expected Expires Ordered    CBC with platelets Routine 9/19/2017 3/18/2018 9/19/2017    Comprehensive metabolic panel Routine 9/19/2017 3/18/2018 9/19/2017    Hemoglobin A1c Routine 9/19/2017 3/18/2018 9/19/2017    Vitamin D Deficiency Routine 9/19/2017 3/18/2018 9/19/2017            Who to contact     If you have questions or need follow up information about today's clinic visit or your schedule please contact [facility] directly at [phone].  Normal or non-critical lab and imaging results will be communicated to you by [employer], letter or phone within 4 business days after the clinic has received the results. If you do not hear from us within 7 days, please contact the clinic through [employer] or phone. If you have a critical or abnormal lab result, we will notify you by phone as soon as possible.  Submit refill requests through [employer] or call your pharmacy and they will forward the refill request to us. Please allow 3 business days for your refill to be completed.          Additional Information About Your Visit        MyChart Information     [employer] gives you secure access to your electronic health record. If you see a primary care provider, you can also send messages to your care team and make appointments. If you have questions, please call your primary care clinic.  If you do not have a primary care provider, please call [phone] and they will assist you.        Care EveryWhere ID     This is your Care EveryWhere ID. This could be used by other organizations to access your [place] medical records  [id number]        Your Vitals Were     Pulse Temperature Respirations Height Pulse Oximetry BMI (Body Mass Index)    96 97.9  F (36.6  C) 18 5' 1.5\" (1.562 m) 98% 44.64 kg/m2       Blood Pressure from Last 3 Encounters:   09/19/17 114/74   11/30/16 94/60   09/18/16 108/69    Weight from Last 3 Encounters:   09/19/17 240 lb 2 oz (108.9 " kg) (>99 %)*   11/30/16 218 lb (98.9 kg) (99 %)*   09/18/16 221 lb 3.2 oz (100.3 kg) (99 %)*     * Growth percentiles are based on CDC 2-20 Years data.              We Performed the Following     NUTRITION REFERRAL     OP ROOMING NOTE TO RISSA     PSYCHOLOGY REFERRAL          Today's Medication Changes          These changes are accurate as of: 9/19/17  3:22 PM.  If you have any questions, ask your nurse or doctor.               Stop taking these medicines if you haven't already. Please contact your care team if you have questions.     loratadine 10 MG tablet   Commonly known as:  CLARITIN   Stopped by:  Ana Maria Christian PA-C                    Primary Care Provider Office Phone # Fax #    Martita Jaimie Miramontes PA-C [phone] [address]  [facility] [address]        Equal Access to Services     [facility]: Hadii aad loki hadasho Sonegarali, waaxda luqadaha, qaybta kaalmada adeegyada, waxay melin haykamran madden . So [facility] [phone].    ATENCIÓN: Si habla español, tiene a guerrier disposición servicios gratuitos de asistencia lingüística. Rachel al [phone].    We comply with applicable federal civil rights laws and [place] laws. We do not discriminate on the basis of race, color, national origin, age, disability sex, sexual orientation or gender identity.            Thank you!     Thank you for choosing [facility]  for your care. Our goal is always to provide you with excellent care. Hearing back from our patients is one way we can continue to improve our services. Please take a few minutes to complete the written survey that you may receive in the mail after your visit with us. Thank you!             Your Updated Medication List - Protect others around you: Learn how to safely use, store and throw away your medicines at www.disposemymeds.org.          This list is accurate as of: 9/19/17  3:22 PM.  Always use your most recent med  list.                   Brand Name Dispense Instructions for use Diagnosis    albuterol 108 (90 BASE) MCG/ACT Inhaler    PROAIR HFA    2 Inhaler    Inhale 2 puffs into the lungs every 4 hours as needed for asthma symptoms.    Mild persistent asthma without complication       EPINEPHrine 0.3 MG/0.3ML injection    EPIPEN    2 each    Inject 0.3 mLs into the muscle once as needed for anaphylaxis.    Allergy to peanuts       fluticasone 110 MCG/ACT Inhaler    FLOVENT HFA    1 Inhaler    Inhale 2 puffs into the lungs 2 times daily for asthma prevention.    Mild persistent asthma without complication       Spacer/Aero Chamber Mouthpiece Misc     2 each    Use with albuterol and flovent    Mild persistent asthma

## 2022-11-17 ENCOUNTER — OFFICE VISIT (OUTPATIENT)
Dept: FAMILY MEDICINE CLINIC | Facility: CLINIC | Age: 72
End: 2022-11-17

## 2022-11-17 VITALS
HEIGHT: 64 IN | DIASTOLIC BLOOD PRESSURE: 70 MMHG | OXYGEN SATURATION: 97 % | TEMPERATURE: 98 F | WEIGHT: 159.6 LBS | BODY MASS INDEX: 27.25 KG/M2 | SYSTOLIC BLOOD PRESSURE: 118 MMHG | HEART RATE: 94 BPM

## 2022-11-17 DIAGNOSIS — R41.3 MEMORY DIFFICULTIES: ICD-10-CM

## 2022-11-17 DIAGNOSIS — Z23 NEED FOR INFLUENZA VACCINATION: ICD-10-CM

## 2022-11-17 DIAGNOSIS — E55.9 VITAMIN D DEFICIENCY: ICD-10-CM

## 2022-11-17 DIAGNOSIS — J45.991 COUGH VARIANT ASTHMA: ICD-10-CM

## 2022-11-17 DIAGNOSIS — F33.9 RECURRENT MAJOR DEPRESSIVE DISORDER, REMISSION STATUS UNSPECIFIED (HCC): ICD-10-CM

## 2022-11-17 DIAGNOSIS — I10 ESSENTIAL HYPERTENSION: ICD-10-CM

## 2022-11-17 DIAGNOSIS — R31.29 MICROSCOPIC HEMATURIA: ICD-10-CM

## 2022-11-17 DIAGNOSIS — R33.9 INCOMPLETE EMPTYING OF BLADDER: ICD-10-CM

## 2022-11-17 DIAGNOSIS — E03.9 HYPOTHYROIDISM, UNSPECIFIED TYPE: ICD-10-CM

## 2022-11-17 DIAGNOSIS — D80.1 HYPOGAMMAGLOBULINEMIA (HCC): ICD-10-CM

## 2022-11-17 DIAGNOSIS — K21.9 GASTROESOPHAGEAL REFLUX DISEASE, UNSPECIFIED WHETHER ESOPHAGITIS PRESENT: ICD-10-CM

## 2022-11-17 DIAGNOSIS — E11.9 TYPE 2 DIABETES MELLITUS WITHOUT COMPLICATION, WITHOUT LONG-TERM CURRENT USE OF INSULIN (HCC): Primary | ICD-10-CM

## 2022-11-17 DIAGNOSIS — E78.5 HYPERLIPIDEMIA, UNSPECIFIED HYPERLIPIDEMIA TYPE: ICD-10-CM

## 2022-11-17 DIAGNOSIS — M85.80 OSTEOPENIA, UNSPECIFIED LOCATION: ICD-10-CM

## 2022-11-17 LAB
ALBUMIN SERPL-MCNC: 4.3 G/DL (ref 3.7–4.7)
ALBUMIN/CREAT UR: 17 MG/G CREAT (ref 0–29)
ALBUMIN/GLOB SERPL: 2 {RATIO} (ref 1.2–2.2)
ALP SERPL-CCNC: 107 IU/L (ref 44–121)
ALT SERPL-CCNC: 48 IU/L (ref 0–32)
AST SERPL-CCNC: 55 IU/L (ref 0–40)
BILIRUB SERPL-MCNC: 0.6 MG/DL (ref 0–1.2)
BUN SERPL-MCNC: 17 MG/DL (ref 8–27)
BUN/CREAT SERPL: 23 (ref 12–28)
CALCIUM SERPL-MCNC: 9.2 MG/DL (ref 8.7–10.3)
CHLORIDE SERPL-SCNC: 99 MMOL/L (ref 96–106)
CHOLEST SERPL-MCNC: 141 MG/DL (ref 100–199)
CO2 SERPL-SCNC: 24 MMOL/L (ref 20–29)
CREAT SERPL-MCNC: 0.74 MG/DL (ref 0.57–1)
CREAT UR-MCNC: 120 MG/DL
EGFR: 85 ML/MIN/1.73
EST. AVERAGE GLUCOSE BLD GHB EST-MCNC: 146 MG/DL
GLOBULIN SER-MCNC: 2.2 G/DL (ref 1.5–4.5)
GLUCOSE SERPL-MCNC: 174 MG/DL (ref 70–99)
HBA1C MFR BLD: 6.7 % (ref 4.8–5.6)
HDLC SERPL-MCNC: 50 MG/DL
LDLC SERPL CALC-MCNC: 72 MG/DL (ref 0–99)
LDLC/HDLC SERPL: 1.4 RATIO (ref 0–3.2)
MICROALBUMIN UR-MCNC: 20.6 UG/ML
POTASSIUM SERPL-SCNC: 4.2 MMOL/L (ref 3.5–5.2)
PROT SERPL-MCNC: 6.5 G/DL (ref 6–8.5)
SL AMB  POCT GLUCOSE, UA: NEGATIVE
SL AMB LEUKOCYTE ESTERASE,UA: NEGATIVE
SL AMB POCT BILIRUBIN,UA: NEGATIVE
SL AMB POCT BLOOD,UA: ABNORMAL
SL AMB POCT CLARITY,UA: CLEAR
SL AMB POCT COLOR,UA: YELLOW
SL AMB POCT KETONES,UA: NEGATIVE
SL AMB POCT NITRITE,UA: NEGATIVE
SL AMB POCT PH,UA: 5.5
SL AMB POCT SPECIFIC GRAVITY,UA: 1.02
SL AMB POCT URINE PROTEIN: NEGATIVE
SL AMB POCT UROBILINOGEN: ABNORMAL
SL AMB VLDL CHOLESTEROL CALC: 19 MG/DL (ref 5–40)
SODIUM SERPL-SCNC: 137 MMOL/L (ref 134–144)
TRIGL SERPL-MCNC: 100 MG/DL (ref 0–149)
TSH SERPL DL<=0.005 MIU/L-ACNC: 0.76 UIU/ML (ref 0.45–4.5)

## 2022-11-17 RX ORDER — BECLOMETHASONE DIPROPIONATE HFA 40 UG/1
2 AEROSOL, METERED RESPIRATORY (INHALATION) 2 TIMES DAILY
COMMUNITY
Start: 2022-09-02

## 2022-11-17 RX ORDER — VENLAFAXINE HYDROCHLORIDE 150 MG/1
150 CAPSULE, EXTENDED RELEASE ORAL EVERY OTHER DAY
COMMUNITY
Start: 2022-08-31

## 2022-11-17 NOTE — PROGRESS NOTES
Name: Charmaine Myers      : 1950      MRN: [id number]  Encounter Provider: Mine Wilson DO  Encounter Date: 2022   Encounter department: [address] [place] [address]     1  Type 2 diabetes mellitus without complication, without long-term current use of insulin ([facility] )  Reviewed labs  Fasting glucose elevated but A1c improved and is down to 6 7  Continue same  She has eye exam scheduled and will have copy sent to our office  2  Gastroesophageal reflux disease, unspecified whether esophagitis present  Some recent reflux and dysphagia  On dexilant  Encouraged f/u with GI Charmayne Duster) as she may need another esophageal dilatation  3  Hypothyroidism, unspecified type  Reviewed labs  Lab Results   Component Value Date    TSH 0 761 2022     She will remain on same dose of her synthroid  4  Essential hypertension  bp at goal on current meds  Continue same  5  Cough variant asthma  On Qvar  Follows with pulmonology  Is stable  6  Osteopenia, unspecified location  Due for dexa  Order placed at previous encounter  7  Hyperlipidemia, unspecified hyperlipidemia type  Lab Results   Component Value Date    CHOLESTEROL 141 2022    CHOLESTEROL 149 2022     Lab Results   Component Value Date    HDL 50 2022    HDL 51 2022     Lab Results   Component Value Date    TRIG 100 2022    TRIG 90 2022     No results found for: NONHDLC  Stable  Tolerating crestor well  8  Recurrent major depressive disorder, remission status unspecified ([place] [address] )  Stable on effexor  She alternates 75 and 150 mg     9  Vitamin D deficiency    10  Hypogammaglobulinemia ([place] [address] )    11  Need for influenza vaccination  -     influenza vaccine, high-dose, PF 0 7 mL (FLUZONE HIGH-DOSE)    12  Incomplete emptying of bladder  -     POCT urine dip  -     Ambulatory Referral to Urology; Future  Recent UTI   Treated with macrobid and then cipro  Still with some sensation of incomplete emptying and has trace blood in urine today  Will refer to urology for evaluation  Send urine for culture  13  BMI 27 0-27 9,adult    14  Memory difficulties  -     Vitamin B12; Future  -     Vitamin B12  Performed well on MMSE today  Scored 31  Advised patient that I think her memory issues are more likely related to stress she is under right now  Will monitor  Check B12  BMI Counseling: Body mass index is 27 4 kg/m²  The BMI is above normal  Nutrition recommendations include decreasing portion sizes, encouraging healthy choices of fruits and vegetables, consuming healthier snacks, limiting drinks that contain sugar and moderation in carbohydrate intake  Exercise recommendations include exercising 3-5 times per week  No pharmacotherapy was ordered  Rationale for BMI follow-up plan is due to patient being overweight or obese  Subjective     HPI     Patient is a 67year old female who is being seen today for f/u on her chronic medical problems as noted below  She had labs completed yesterday  Results are pending  Is overdue for her diabetic eye exam      Patient was seen on 11/2/22 for urinary frequency, dysuria and urgency as well as some gross hematuria  Her culture was positive for e coli and klebsiella  She was treated with a course of macrobid  She called noting that sx persisted after completing the macrobid  She was given rx for cipro  Today, noting that she is still having issues with incomplete emptying of bladder  No longer getting any dysuria or hematuria  She wants to see urologist      Also notes some mild issues with her memory  She reports that she will just lose her train of thought during conversation with people  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        PHQ-2/9 Depression Screening    Little interest or pleasure in doing things: 0 - not at all  Feeling down, depressed, or hopeless: 0 - not at all  Trouble falling or staying asleep, or sleeping too much: 3 - nearly every day  Feeling tired or having little energy: 3 - nearly every day  Poor appetite or overeatin - not at all  Feeling bad about yourself - or that you are a failure or have let yourself or your family down: 0 - not at all  Trouble concentrating on things, such as reading the newspaper or watching television: 0 - not at all  Moving or speaking so slowly that other people could have noticed  Or the opposite - being so fidgety or restless that you have been moving around a lot more than usual: 0 - not at all  Thoughts that you would be better off dead, or of hurting yourself in some way: 0 - not at all  PHQ-9 Score: 6   PHQ-9 Interpretation: Mild depression              Patient Active Problem List   Diagnosis   • GERD (gastroesophageal reflux disease)--Last EGD 2020 (dr Sue Rangel)  On dexilant; Other meds she tried in past--ranitidine did not work for her  Has some reflux the last week or two  She notes that her K is starting to get stuck  Her last EGD with dilatation was approximately 1-2 years ago  She does plan to call to schedule appt  • Rectal bleeding   • Abnormal levels of other serum enzymes   • Cough variant asthma--follows with pulmonology, Dr Radha Carvajal  On beclomethasone inhaler 2 puffs bid   • Current use of proton pump inhibitor   • Deformity of right foot--was referred to podiatry at a previous visit  • Dyspnea   • Essential hypertension--taking and tolerating losartan 100 mg, hctz 25 mg daily and amlodipine 5 mg daily   • Generalized atherosclerosis   • Hyperlipidemia--had labs done 2 days ago  Results pending  She is taking her crestor and tolerating well  Is eating healthy diet  No fried foods and rare saturated fats   • Hypothyroid   • Lichen planus   • Major depression, recurrent (HCC)--moods are stable  • NAFLD (nonalcoholic fatty liver disease)   • Osteopenia--dexa ordered at prior visit   Has not been completed yet   • Sarcoidosis--follows with pulmonology (Dr Aries Johnston)   • Solitary pulmonary nodule   • Type 2 diabetes mellitus without complication ([address] )   • Vitamin D deficiency   • Hypogammaglobulinemia (HCC)--Per pulmonology consultation note dated 4/29/20 which was reviewed by me after receipt of transferred records on 3/11/22  IgG deficiency  Pulmonology discussed benefits and risks of immunoglobulin therapy  Decided on close observation, treating infections early (by PCP or pulmonology) and if hospitalized for pneumonia, consider IV IgG     • Rupture of extensor tendon of foot       Review of Systems    Past Medical History:   Diagnosis Date   • Anxiety 03/29/2021   • Cataract 03/29/2021   • cough variant asthma    • COVID-19 03/06/2021    had again 3/29/22 (+antigen test)   • Diabetes mellitus ([address] )    • Disease of thyroid gland    • Diverticulosis     colonoscopy 7/31/20   • Fatty liver disease, nonalcoholic    • GERD (gastroesophageal reflux disease)     Last EGD 2016   • 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; treated with voriconazole 200 bid x 12 weeks   • Hx of migraine headaches     pt does not have   • Hyperlipidemia    • Hypertension    • Hypogammaglobulinemia ([address] )    • Pulmonary embolism ([address] )    • Reactive airway disease    • Restless leg syndrome    • Sepsis ([address] )    • Streptococcal pneumonia ([facility] )    • Tubular adenoma 07/31/2020    Dr Minerva Sanderson   recall 5 years     Past Surgical History:   Procedure Laterality Date   • CARDIAC CATHETERIZATION     • CATARACT EXTRACTION, BILATERAL     • COLONOSCOPY     • CORNEA LACERATION REPAIR Right    • REFRACTIVE SURGERY     • TONSILLECTOMY     • UPPER GASTROINTESTINAL ENDOSCOPY     • WISDOM TOOTH EXTRACTION       Family History   Problem Relation Age of Onset   • Heart disease Mother    • Heart disease Father    • Stroke Sister    • Heart disease Sister    • Lung cancer Sister    • Heart disease Brother    • Colon polyps Neg Hx    • Colon cancer Neg Hx      Social History     Socioeconomic History   • Marital status:      Spouse name: None   • Number of children: None   • Years of education: None   • Highest education level: None   Occupational History   • None   Tobacco Use   • Smoking status: Former     Packs/day: 0 25     Years: 25 00     Pack years: 6 25     Types: Cigarettes     Start date: 64     Quit date:      Years since quittin 9   • Smokeless tobacco: Never   Vaping Use   • Vaping Use: Never used   Substance and Sexual Activity   • Alcohol use: Yes     Alcohol/week: 14 0 standard drinks     Types: 14 Glasses of wine per week   • Drug use: Not Currently   • Sexual activity: Not Currently   Other Topics Concern   • None   Social History Narrative        Lives in in-law suite with son and his family    Used to work for [employer] as a     Likes to [place] and read     Social Determinants of Health     Financial Resource Strain: Not on file   Food Insecurity: Not on file   Transportation Needs: Not on file   Physical Activity: Not on file   Stress: Not on file   Social Connections: Not on file   Intimate Partner Violence: Not on file   Housing Stability: Not on file     Current Outpatient Medications on File Prior to Visit   Medication Sig   • albuterol (PROVENTIL HFA,VENTOLIN HFA) 90 mcg/act inhaler Inhale 2 puffs 4 (four) times a day   • amLODIPine (NORVASC) 5 mg tablet Take 5 mg by mouth daily     • ascorbic acid (VITAMIN C) 500 mg tablet Take 500 mg by mouth daily   • benzonatate (TESSALON PERLES) 100 mg capsule Take 100 mg by mouth 3 (three) times a day as needed for cough   • Biotin 5000 MCG CAPS Take 5,000 mcg by mouth in the morning   • budesonide (PULMICORT) 0 5 mg/2 mL nebulizer solution Take 0 5 mg by nebulization as needed in the morning  Rinse mouth after use       • calcium carbonate (OS-BRITTA) 600 MG tablet Take 600 mg by mouth 2 (two) times a day with meals   • cholecalciferol (VITAMIN D3) 1,000 units tablet Take 1,000 Units by mouth daily   • dexlansoprazole (DEXILANT) 60 MG capsule Take 60 mg by mouth daily   • GUAIFENESIN 1200 PO Take 1,500 mg by mouth 2 (two) times a day   • hydrochlorothiazide (HYDRODIURIL) 25 mg tablet TAKE 1 TABLET DAILY   • ipratropium-albuterol, FOR EMS ONLY, (DUO-NEB) 0 5-2 5 mg/3 mL nebulizer solution Use 3 mL as needed     • losartan (COZAAR) 100 MG tablet TAKE 1 TABLET DAILY   • metFORMIN (GLUCOPHAGE-XR) 500 mg 24 hr tablet Take 1,000 mg by mouth daily   • nitroglycerin (NITROSTAT) 0 4 mg SL tablet    • OZEMPIC, 0 25 OR 0 5 MG/DOSE, 2 MG/1 5ML SOPN    • potassium chloride (K-DUR,KLOR-CON) 20 mEq tablet Take 20 mEq by mouth daily     • Qvar RediHaler 40 MCG/ACT inhaler Inhale 2 puffs 2 (two) times a day   • rosuvastatin (CRESTOR) 20 MG tablet Take 20 mg by mouth daily     • SYNTHROID 75 MCG tablet Take 75 mcg by mouth daily in the early morning     • venlafaxine (EFFEXOR-XR) 150 mg 24 hr capsule Take 150 mg by mouth every other day   • venlafaxine (EFFEXOR-XR) 75 mg 24 hr capsule Take 75 mg by mouth every other day   • phenazopyridine (PYRIDIUM) 100 mg tablet Take 1 tablet (100 mg total) by mouth 3 (three) times a day as needed for bladder spasms (Patient not taking: Reported on 11/17/2022)   • [DISCONTINUED] beclomethasone (QVAR) 40 MCG/ACT inhaler Inhale 2 puffs  in the morning and 2 puffs in the evening  Rinse mouth after use         Allergies   Allergen Reactions   • Epinephrine Shortness Of Breath and Palpitations   • Carisoprodol Other (See Comments)     Other reaction(s): SOMA (11/04/2010)     • Irbesartan-Hydrochlorothiazide Other (See Comments)     Possible lichen planus   • Lisinopril      Lichen planus   • Other Other (See Comments)     Other reaction(s): Muscle Relaxers (09/03/2014)     • Tizanidine      Altered mental status     Immunization History   Administered Date(s) Administered   • Hep A, adult 03/02/2005   • INFLUENZA 02/09/2010, 10/14/2011, 09/28/2016, 10/11/2017   • Influenza Split High Dose Preservative Free IM 11/20/2019   • Influenza, high dose seasonal 0 7 mL 11/17/2022   • Pneumococcal Conjugate Vaccine 20-valent (Pcv20), Polysace 05/17/2022   • Pneumococcal Polysaccharide PPV23 01/10/2006, 01/01/2014   • Td (adult), adsorbed 03/08/2002, 10/13/2003   • Tdap 10/06/2010       Objective     /70 (BP Location: Left arm, Patient Position: Sitting, Cuff Size: Standard)   Pulse 94   Temp 98 °F (36 7 °C) (Tympanic)   Ht 5' 4" (1 626 m)   Wt 72 4 kg (159 lb 9 6 oz)   LMP  (LMP Unknown)   SpO2 97%   BMI 27 40 kg/m²     Physical Exam  Vitals and nursing note reviewed  Constitutional:       General: She is not in acute distress  Appearance: Normal appearance  She is obese  She is not ill-appearing, toxic-appearing or diaphoretic  HENT:      Head: Normocephalic and atraumatic  Right Ear: Tympanic membrane normal       Left Ear: Tympanic membrane normal       Mouth/Throat:      Mouth: Mucous membranes are moist       Pharynx: No posterior oropharyngeal erythema  Eyes:      Extraocular Movements: Extraocular movements intact  Conjunctiva/sclera: Conjunctivae normal       Pupils: Pupils are equal, round, and reactive to light  Neck:      Comments: No thyromegaly  Cardiovascular:      Rate and Rhythm: Normal rate and regular rhythm  Heart sounds: No murmur heard  Pulmonary:      Effort: Pulmonary effort is normal       Breath sounds: Normal breath sounds  No wheezing  Abdominal:      General: Abdomen is flat  Bowel sounds are normal       Palpations: Abdomen is soft  Musculoskeletal:      Cervical back: Normal range of motion and neck supple  Right lower leg: No edema  Left lower leg: No edema  Lymphadenopathy:      Cervical: No cervical adenopathy  Skin:     General: Skin is warm and dry  Findings: No rash  Neurological:      General: No focal deficit present        Mental Status: She is alert and oriented to person, place, and time     Psychiatric:         Mood and Affect: Mood normal        Antonio Farley,

## 2022-11-18 ENCOUNTER — TELEPHONE (OUTPATIENT)
Dept: FAMILY MEDICINE CLINIC | Facility: CLINIC | Age: 72
End: 2022-11-18

## 2022-11-18 ENCOUNTER — TELEPHONE (OUTPATIENT)
Dept: UROLOGY | Facility: AMBULATORY SURGERY CENTER | Age: 72
End: 2022-11-18

## 2022-11-18 NOTE — TELEPHONE ENCOUNTER
Pt called to say she has had a UTI for 2 weeks  Constantly has to go to the bathroom and feels like she is not emptying her bladder and is experiencing flank pain  She is still urinating blood and cannot get into Urology until December 19th   She did not want to come into [facility] to see the nurse practitioner so she was advised to go to the ER

## 2022-11-18 NOTE — TELEPHONE ENCOUNTER
Please Triage  New Patient    What is the reason for the patient’s appointment? Referral for R33 9 (ICD-10-CM) - Incomplete emptying of bladder     Pt also states that there is microscopic blood in her urine  Sometime she has burning when urinating    What office location does the patient prefer? [place]     Imaging/Lab Results:    Do we accept the patient's insurance or is the patient Self-Pay? Insurance Provider: united health care   Plan Type/Number:  Member ID#: Has the patient had any previous Urologist(s)? 30 - 40 years ago     Have patient records been requested? If not are records showing in Epic:     Has the patient had any outside testing done? Does the patient have a personal history of cancer?  no   Appt made on 12/19/22

## 2022-11-21 ENCOUNTER — TELEPHONE (OUTPATIENT)
Dept: PODIATRY | Facility: CLINIC | Age: 72
End: 2022-11-21

## 2022-11-21 NOTE — TELEPHONE ENCOUNTER
LM letting her know that Dr Randy Garcia schedule is now open and she can give us a call to schedule her annual diabetic foot exam  Her appt should be scheduled for September of 2023  [phone]

## 2022-11-28 LAB
LEFT EYE DIABETIC RETINOPATHY: NORMAL
RIGHT EYE DIABETIC RETINOPATHY: NORMAL

## 2022-11-28 PROCEDURE — 2023F DILAT RTA XM W/O RTNOPTHY: CPT | Performed by: FAMILY MEDICINE

## 2022-12-12 ENCOUNTER — VBI (OUTPATIENT)
Dept: ADMINISTRATIVE | Facility: OTHER | Age: 72
End: 2022-12-12

## 2022-12-19 ENCOUNTER — OFFICE VISIT (OUTPATIENT)
Dept: UROLOGY | Facility: HOSPITAL | Age: 72
End: 2022-12-19

## 2022-12-19 VITALS
SYSTOLIC BLOOD PRESSURE: 116 MMHG | BODY MASS INDEX: 26.26 KG/M2 | DIASTOLIC BLOOD PRESSURE: 68 MMHG | OXYGEN SATURATION: 95 % | WEIGHT: 153 LBS | HEART RATE: 89 BPM

## 2022-12-19 DIAGNOSIS — N39.0 RECURRENT UTI: Primary | ICD-10-CM

## 2022-12-19 DIAGNOSIS — R39.14 FEELING OF INCOMPLETE BLADDER EMPTYING: ICD-10-CM

## 2022-12-19 DIAGNOSIS — R33.9 INCOMPLETE EMPTYING OF BLADDER: ICD-10-CM

## 2022-12-19 LAB
POST-VOID RESIDUAL VOLUME, ML POC: 0 ML
SL AMB  POCT GLUCOSE, UA: NORMAL
SL AMB LEUKOCYTE ESTERASE,UA: NORMAL
SL AMB POCT BILIRUBIN,UA: NORMAL
SL AMB POCT BLOOD,UA: NORMAL
SL AMB POCT CLARITY,UA: NORMAL
SL AMB POCT COLOR,UA: NORMAL
SL AMB POCT KETONES,UA: NORMAL
SL AMB POCT NITRITE,UA: NORMAL
SL AMB POCT PH,UA: 6
SL AMB POCT SPECIFIC GRAVITY,UA: 1.03
SL AMB POCT URINE PROTEIN: NORMAL
SL AMB POCT UROBILINOGEN: 0.2

## 2022-12-19 NOTE — PROGRESS NOTES
12/19/22    Viv Corona   1950   [id number]     Assessment  1 Sensation of incomplete emptying of bladder   2 UTI      Discussion/Plan  1 Sensation of incomplete emptying of bladder    2 UTI      Renal US with PVR ordered   Urine culture sent   Educational packet provided, UTI prevention reviewed at length, eliminate bladder irritants, hydration with increased water, bladder diary recommended     Await urine studies and imaging  Subjective  HPI   Ruperto García is a 67year old female who presents in consultation for evaluation of UTI and sensation of incomplete bladder emptying  She is diabetic with A1c of 6 7%  She had positive urine culture 11/2/22 which grew E  Coli and Klebsiella  She was treated with Ciprofloxacin  She reports a low grade fever of 99 4 last week  She consumes 2-3 glasses of water and 2 cups of tea daily  She endorses leakage of urine which has been worse with infection  She presents with a dark cola in hand  Review of Systems - History obtained from chart review and the patient  General ROS: negative  Psychological ROS: negative  Cardiovascular ROS: negative  Gastrointestinal ROS: no abdominal pain, change in bowel habits, or black or bloody stools  Genito-Urinary ROS: positive for - dysuria and urinary frequency/urgency  Musculoskeletal ROS: negative  Neurological ROS: no TIA or stroke symptoms  Dermatological ROS: negative       Objective  Physical Exam  Vitals and nursing note reviewed  Constitutional:       General: She is awake  She is not in acute distress  Appearance: Normal appearance  She is well-developed, well-groomed and overweight  She is not ill-appearing, toxic-appearing or diaphoretic  HENT:      Head: Normocephalic and atraumatic  Pulmonary:      Effort: Pulmonary effort is normal  No respiratory distress  Abdominal:      Tenderness: There is no right CVA tenderness or left CVA tenderness     Musculoskeletal:         General: Normal range of motion  Cervical back: Normal range of motion  Skin:     General: Skin is warm and dry  Neurological:      General: No focal deficit present  Mental Status: She is alert and oriented to person, place, and time  Mental status is at baseline  Psychiatric:         Mood and Affect: Mood normal          Behavior: Behavior normal  Behavior is cooperative  Thought Content: Thought content normal          Judgment: Judgment normal               BREE James Caro     I have spent 15 minutes with Patient  today in which greater than 50% of this time was spent in counseling/coordination of care regarding Patient and family education, Importance of tx compliance and Risk factor reductions

## 2022-12-20 ENCOUNTER — HOSPITAL ENCOUNTER (OUTPATIENT)
Dept: ULTRASOUND IMAGING | Facility: HOSPITAL | Age: 72
Discharge: HOME | End: 2022-12-20

## 2022-12-20 DIAGNOSIS — R33.9 INCOMPLETE EMPTYING OF BLADDER: ICD-10-CM

## 2022-12-20 DIAGNOSIS — R39.14 FEELING OF INCOMPLETE BLADDER EMPTYING: ICD-10-CM

## 2022-12-21 LAB
APPEARANCE UR: CLEAR
BACTERIA UR CULT: ABNORMAL
BACTERIA URNS QL MICRO: ABNORMAL
BILIRUB UR QL STRIP: NEGATIVE
CASTS URNS QL MICRO: ABNORMAL /LPF
COLOR UR: YELLOW
EPI CELLS #/AREA URNS HPF: ABNORMAL /HPF (ref 0–10)
GLUCOSE UR QL: NEGATIVE
HGB UR QL STRIP: NEGATIVE
KETONES UR QL STRIP: NEGATIVE
LEUKOCYTE ESTERASE UR QL STRIP: ABNORMAL
Lab: ABNORMAL
MICRO URNS: ABNORMAL
NITRITE UR QL STRIP: NEGATIVE
PH UR STRIP: 6 [PH] (ref 5–7.5)
PROT UR QL STRIP: ABNORMAL
RBC #/AREA URNS HPF: ABNORMAL /HPF (ref 0–2)
SP GR UR: 1.02 (ref 1–1.03)
UROBILINOGEN UR STRIP-ACNC: 0.2 MG/DL (ref 0.2–1)
WBC #/AREA URNS HPF: ABNORMAL /HPF (ref 0–5)

## 2023-02-14 ENCOUNTER — TELEPHONE (OUTPATIENT)
Dept: GASTROENTEROLOGY | Facility: CLINIC | Age: 73
End: 2023-02-14

## 2023-02-14 NOTE — TELEPHONE ENCOUNTER
Patients GI provider:  Dr Castañeda Spearing    Number to return call: [phone]    Reason for call: Pt calling stating she is bleeding rectally  She thinks it is a hemorrhoid  Would like apt to come in and be seen  No available apts until may      Scheduled procedure/appointment date if applicable: no apts or procedures

## 2023-02-17 ENCOUNTER — OFFICE VISIT (OUTPATIENT)
Dept: GASTROENTEROLOGY | Facility: CLINIC | Age: 73
End: 2023-02-17

## 2023-02-17 ENCOUNTER — TELEPHONE (OUTPATIENT)
Dept: GASTROENTEROLOGY | Facility: CLINIC | Age: 73
End: 2023-02-17

## 2023-02-17 VITALS
WEIGHT: 154 LBS | BODY MASS INDEX: 26.29 KG/M2 | SYSTOLIC BLOOD PRESSURE: 130 MMHG | DIASTOLIC BLOOD PRESSURE: 88 MMHG | HEIGHT: 64 IN

## 2023-02-17 DIAGNOSIS — R19.7 DIARRHEA, UNSPECIFIED TYPE: ICD-10-CM

## 2023-02-17 DIAGNOSIS — K62.5 RECTAL BLEEDING: Primary | ICD-10-CM

## 2023-02-17 RX ORDER — CALCIUM POLYCARBOPHIL 625 MG 625 MG/1
625 TABLET ORAL DAILY
Qty: 30 TABLET | Refills: 11 | Status: SHIPPED | OUTPATIENT
Start: 2023-02-17

## 2023-02-17 NOTE — TELEPHONE ENCOUNTER
Scheduled date of colonoscopy (as of today): 03/21/23  Physician performing colonoscopy: Dr Mesfin Jones  Location of colonoscopy: [facility]  Bowel prep reviewed with patient: Clenpiq and was given a sample here  Instructions reviewed with patient by:Gave the patient the folder to take home and read  Clearances: No

## 2023-02-17 NOTE — PROGRESS NOTES
[address] [facility] - Outpatient Follow-up Note  Varsha Gilliland 67 y o  female MRN: [id number]  Encounter: [id number]    ASSESSMENT AND PLAN:      1  Rectal bleeding  Patient with multiple episodes of rectal bleeding  Red blood  Not associated with bowel movement  Can be in the setting of internal hemorrhoids  But will need to rule out other causes of rectal bleeding including polyps, malignancy, inflammatory bowel disease, AVMs  Last colonoscopy in 2020 only showed small internal hemorrhoids  - Colonoscopy; Future    2  Diarrhea, unspecified type  Patient with likely medication induced diarrhea from the [address] [place] [address]  Patient wants to continue with the [address] [place] [address] at this time as it is controlling her reflux symptoms  We will add on fiber supplementation to help bulk up her stools  - calcium polycarbophil (FIBERCON) 625 mg tablet; Take 1 tablet (625 mg total) by mouth daily  Dispense: 30 tablet; Refill: 11    3  GERD  Recommended lifestyle modifications of weight loss, diet and exercise, avoidance of triggers, avoidance of late-night meals, elevation of the head of the bed    Can continue Dexilant 30 mg once daily at this time  Follow up appointment: For colonoscopy      ______________________________________________________________________    Chief Complaint   Patient presents with   • Rectal Bleeding     Lots of red blood     HPI:   Patient is a 65-year-old female past medical history of diarrhea, GERD who is presenting for follow-up regarding diarrhea and rectal bleeding  She was last seen in the office in March 2021  Currently on Dexilant 30 mg daily  Patient has noticed over the past couple months that she has had 3 episodes of rectal bleeding  In the morning without bowel movement  Following the toilet bowl with red blood  Not associated with bowel movements or straining or constipation  Patient does have looser stools about 2-3 times a day on the Dexilant  Currently denies any lightheadedness or dizziness  No shortness of breath or chest pain  Denies any nausea vomiting dysphagia or abdominal pain  No rectal pain  Diarrhea work-up in the past showed no etiology noted on labs, stool or colonoscopy  Biopsies taken to rule out for microscopic colitis  Last colonoscopy was in 2020  Recall due in 5 years  EGD with mild Schatzki's ring dilated to [address], mild antritis  GI History:  Blood thinners: Denies ASA, antiplatelet, or anticoagulation  NSAID use: occasional  Insulin use: None    Abdominal Surgical Hx: None  Family Hx: Denies first degree relatives with GI malignancies  GI procedure Hx:  Last colonoscopy was in 2020  Recall due in 5 years  EGD with mild Schatzki's ring dilated to [address], mild antritis  Historical Information   Past Medical History:   Diagnosis Date   • Anxiety 03/29/2021   • Cataract 03/29/2021   • cough variant asthma    • COVID-19 03/06/2021    had again 3/29/22 (+antigen test)   • Diabetes mellitus ([facility] [address] )    • Disease of thyroid gland    • Diverticulosis     colonoscopy 7/31/20   • Fatty liver disease, nonalcoholic    • GERD (gastroesophageal reflux disease)     Last EGD 2016   • 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; treated with voriconazole 200 bid x 12 weeks   • Hx of migraine headaches     pt does not have   • Hyperlipidemia    • Hypertension    • Hypogammaglobulinemia ([address] )    • Pulmonary embolism (HCC)    • Reactive airway disease    • Restless leg syndrome    • Sepsis ([address] )    • Streptococcal pneumonia ([facility] [address] )    • Tubular adenoma 07/31/2020    Dr Ana Suh   recall 5 years     Past Surgical History:   Procedure Laterality Date   • CARDIAC CATHETERIZATION     • CATARACT EXTRACTION, BILATERAL     • COLONOSCOPY     • CORNEA LACERATION REPAIR Right    • REFRACTIVE SURGERY     • TONSILLECTOMY     • UPPER GASTROINTESTINAL ENDOSCOPY     • WISDOM TOOTH EXTRACTION       Social History     Substance and Sexual Activity   Alcohol Use Yes   • Alcohol/week: 14 0 standard drinks   • Types: 14 Glasses of wine per week     Social History     Substance and Sexual Activity   Drug Use Not Currently     Social History     Tobacco Use   Smoking Status Former   • Packs/day: 0 25   • Years: 25 00   • Pack years: 6 25   • Types: Cigarettes   • Start date: 64   • Quit date: 12   • Years since quittin 1   Smokeless Tobacco Never     Family History   Problem Relation Age of Onset   • Heart disease Mother    • Heart disease Father    • Stroke Sister    • Heart disease Sister    • Lung cancer Sister    • Heart disease Brother    • Colon polyps Neg Hx    • Colon cancer Neg Hx          Current Outpatient Medications:   •  albuterol (PROVENTIL HFA,VENTOLIN HFA) 90 mcg/act inhaler  •  amLODIPine (NORVASC) 5 mg tablet  •  ascorbic acid (VITAMIN C) 500 mg tablet  •  benzonatate (TESSALON PERLES) 100 mg capsule  •  Biotin 5000 MCG CAPS  •  budesonide (PULMICORT) 0 5 mg/2 mL nebulizer solution  •  calcium carbonate (OS-BRITTA) 600 MG tablet  •  calcium polycarbophil (FIBERCON) 625 mg tablet  •  cholecalciferol (VITAMIN D3) 1,000 units tablet  •  dexlansoprazole (DEXILANT) 60 MG capsule  •  GUAIFENESIN 1200 PO  •  hydrochlorothiazide (HYDRODIURIL) 25 mg tablet  •  ipratropium-albuterol, FOR EMS ONLY, (DUO-NEB) 0 5-2 5 mg/3 mL nebulizer solution  •  losartan (COZAAR) 100 MG tablet  •  metFORMIN (GLUCOPHAGE-XR) 500 mg 24 hr tablet  •  nitroglycerin (NITROSTAT) 0 4 mg SL tablet  •  OZEMPIC, 0 25 OR 0 5 MG/DOSE, 2 MG/1 5ML SOPN  •  potassium chloride (K-DUR,KLOR-CON) 20 mEq tablet  •  Qvar RediHaler 40 MCG/ACT inhaler  •  rosuvastatin (CRESTOR) 20 MG tablet  •  SYNTHROID 75 MCG tablet  •  venlafaxine (EFFEXOR-XR) 150 mg 24 hr capsule  •  venlafaxine (EFFEXOR-XR) 75 mg 24 hr capsule  Allergies   Allergen Reactions   • Epinephrine Shortness Of Breath and Palpitations   • Carisoprodol Other (See Comments)     Other reaction(s): SOMA (11/04/2010)     • Irbesartan-Hydrochlorothiazide Other (See Comments)     Possible lichen planus   • Lisinopril      Lichen planus   • Other Other (See Comments)     Other reaction(s): Muscle Relaxers (09/03/2014)     • Tizanidine      Altered mental status     Reviewed medications and allergies and updated as indicated    PHYSICAL EXAM:    Blood pressure 130/88, height 5' 4" (1 626 m), weight 69 9 kg (154 lb)  Body mass index is 26 43 kg/m²  General Appearance: NAD, cooperative, alert  Eyes: Anicteric  GI:  Soft, non-tender, non-distended; normal bowel sounds; no masses, no organomegaly   Rectal: Small external hemorrhoids  Musculoskeletal: No edema  Skin:  No jaundice    Lab Results:   Lab Results   Component Value Date    WBC 10 1 03/24/2022    HGB 14 5 03/24/2022    MCV 97 03/24/2022     03/24/2022     Lab Results   Component Value Date    K 4 2 11/16/2022    CL 99 11/16/2022    CO2 24 11/16/2022    BUN 17 11/16/2022    CREATININE 0 74 11/16/2022    AST 55 (H) 11/16/2022    AST 33 03/24/2022    ALT 48 (H) 11/16/2022    ALT 31 03/24/2022    EGFR 85 11/16/2022     No results found for: IRON, TIBC, FERRITIN  No results found for: LIPASE    Radiology Results:   No results found

## 2023-02-21 DIAGNOSIS — K21.9 GASTROESOPHAGEAL REFLUX DISEASE, UNSPECIFIED WHETHER ESOPHAGITIS PRESENT: Primary | ICD-10-CM

## 2023-02-21 DIAGNOSIS — K62.5 RECTAL BLEEDING: ICD-10-CM

## 2023-02-21 RX ORDER — DEXLANSOPRAZOLE 60 MG/1
60 CAPSULE, DELAYED RELEASE ORAL DAILY
Qty: 90 CAPSULE | Refills: 1 | Status: SHIPPED | OUTPATIENT
Start: 2023-02-21 | End: 2023-03-01 | Stop reason: SDUPTHER

## 2023-02-21 NOTE — TELEPHONE ENCOUNTER
MED LINE, RX LINE  [facility] [phone]  Patient also had another concern wanting to be relayed over to [address] [place] [address]  Lately, she's been bleeding from the rectum  Her GI ordered her a colonoscopy which she will be getting 3/21/2023 for further evaluation  One of her family members recommended her to get her CBC checked  Patient would like [address] [place] [address] to place an order into [facility] for her to get her CBC checked on top of the other active panels she has active  Ok to place? If so, what diag would you like attached?

## 2023-02-21 NOTE — TELEPHONE ENCOUNTER
Of course, I will order CBC and iron studies for her to have done at [address] [place] [address]    Will await results of colonoscopy

## 2023-02-28 NOTE — TELEPHONE ENCOUNTER
Labs Only-    I left a VM advising pt to call the office c/o lab orders  I wish to inform the pt that Dr Nimo Bone placed requested lab orders last week  Orders are still active

## 2023-03-01 DIAGNOSIS — K21.9 GASTROESOPHAGEAL REFLUX DISEASE, UNSPECIFIED WHETHER ESOPHAGITIS PRESENT: ICD-10-CM

## 2023-03-01 RX ORDER — DEXLANSOPRAZOLE 60 MG/1
60 CAPSULE, DELAYED RELEASE ORAL DAILY
Qty: 90 CAPSULE | Refills: 1 | Status: SHIPPED | OUTPATIENT
Start: 2023-03-01

## 2023-03-09 ENCOUNTER — HOSPITAL ENCOUNTER (OUTPATIENT)
Dept: MAMMOGRAPHY | Facility: IMAGING CENTER | Age: 73
Discharge: HOME | End: 2023-03-09

## 2023-03-09 VITALS — WEIGHT: 154 LBS | BODY MASS INDEX: 26.29 KG/M2 | HEIGHT: 64 IN

## 2023-03-09 DIAGNOSIS — Z12.31 VISIT FOR SCREENING MAMMOGRAM: ICD-10-CM

## 2023-03-15 ENCOUNTER — TELEPHONE (OUTPATIENT)
Dept: OTHER | Facility: OTHER | Age: 73
End: 2023-03-15

## 2023-03-15 NOTE — TELEPHONE ENCOUNTER
Patient has a colonoscopy scheduled for 03/21 and would like to know how long she will be there in total so that she can arrange for transportation

## 2023-03-21 ENCOUNTER — ANESTHESIA (OUTPATIENT)
Dept: GASTROENTEROLOGY | Facility: AMBULATORY SURGERY CENTER | Age: 73
End: 2023-03-21

## 2023-03-21 ENCOUNTER — ANESTHESIA EVENT (OUTPATIENT)
Dept: GASTROENTEROLOGY | Facility: AMBULATORY SURGERY CENTER | Age: 73
End: 2023-03-21

## 2023-03-21 ENCOUNTER — HOSPITAL ENCOUNTER (OUTPATIENT)
Dept: GASTROENTEROLOGY | Facility: AMBULATORY SURGERY CENTER | Age: 73
Discharge: HOME | End: 2023-03-21
Attending: INTERNAL MEDICINE

## 2023-03-21 VITALS
RESPIRATION RATE: 20 BRPM | BODY MASS INDEX: 26.29 KG/M2 | HEART RATE: 72 BPM | WEIGHT: 154 LBS | OXYGEN SATURATION: 98 % | HEIGHT: 64 IN | SYSTOLIC BLOOD PRESSURE: 138 MMHG | TEMPERATURE: 97.4 F | DIASTOLIC BLOOD PRESSURE: 74 MMHG

## 2023-03-21 DIAGNOSIS — K62.5 RECTAL BLEEDING: ICD-10-CM

## 2023-03-21 RX ORDER — PROPOFOL 10 MG/ML
INJECTION, EMULSION INTRAVENOUS AS NEEDED
Status: DISCONTINUED | OUTPATIENT
Start: 2023-03-21 | End: 2023-03-21

## 2023-03-21 RX ORDER — SODIUM CHLORIDE, SODIUM LACTATE, POTASSIUM CHLORIDE, CALCIUM CHLORIDE 600; 310; 30; 20 MG/100ML; MG/100ML; MG/100ML; MG/100ML
50 INJECTION, SOLUTION INTRAVENOUS CONTINUOUS
Status: DISCONTINUED | OUTPATIENT
Start: 2023-03-21 | End: 2023-03-25 | Stop reason: HOSPADM

## 2023-03-21 RX ADMIN — PROPOFOL 50 MG: 10 INJECTION, EMULSION INTRAVENOUS at 13:17

## 2023-03-21 RX ADMIN — PROPOFOL 50 MG: 10 INJECTION, EMULSION INTRAVENOUS at 13:09

## 2023-03-21 RX ADMIN — PROPOFOL 50 MG: 10 INJECTION, EMULSION INTRAVENOUS at 13:11

## 2023-03-21 RX ADMIN — PROPOFOL 100 MG: 10 INJECTION, EMULSION INTRAVENOUS at 13:07

## 2023-03-21 RX ADMIN — PROPOFOL 50 MG: 10 INJECTION, EMULSION INTRAVENOUS at 13:13

## 2023-03-21 RX ADMIN — SODIUM CHLORIDE, SODIUM LACTATE, POTASSIUM CHLORIDE, CALCIUM CHLORIDE 50 ML/HR: 600; 310; 30; 20 INJECTION, SOLUTION INTRAVENOUS at 12:55

## 2023-03-21 NOTE — H&P
History and Physical - [facility]  Charmaine Myers 67 y o  female MRN: [id number]    HPI: Charmaine Myers is a 67y o  year old female who presents for colonoscopy for rectal bleeding  Last colonoscopy was in     REVIEW OF SYSTEMS: Per the HPI, and otherwise unremarkable  Historical Information   Past Medical History:   Diagnosis Date   • Anxiety 2021   • Cataract 2021   • cough variant asthma    • COVID-19 2021    had again 3/29/22 (+antigen test)   • Diabetes mellitus ([facility] [address] )    • Disease of thyroid gland    • Diverticulosis     colonoscopy 20   • Fatty liver disease, nonalcoholic    • GERD (gastroesophageal reflux disease)     Last EGD    • 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; treated with voriconazole 200 bid x 12 weeks   • Hx of migraine headaches     pt does not have   • Hyperlipidemia    • Hypertension    • Hypogammaglobulinemia ([facility] [address] )    • Pulmonary embolism (HCC)    • Reactive airway disease    • Restless leg syndrome    • Sepsis ([facility] [address] )    • Streptococcal pneumonia ([facility] [address] )    • Tubular adenoma 2020    Dr Bean Hoffmann   recall 5 years     Past Surgical History:   Procedure Laterality Date   • BREAST BIOPSY Right     benign   • CARDIAC CATHETERIZATION     • CATARACT EXTRACTION, BILATERAL     • COLONOSCOPY     • CORNEA LACERATION REPAIR Right    • REFRACTIVE SURGERY     • TONSILLECTOMY     • UPPER GASTROINTESTINAL ENDOSCOPY     • WISDOM TOOTH EXTRACTION       Social History   Social History     Substance and Sexual Activity   Alcohol Use Yes   • Alcohol/week: 14 0 standard drinks   • Types: 14 Glasses of wine per week     Social History     Substance and Sexual Activity   Drug Use Not Currently     Social History     Tobacco Use   Smoking Status Former   • Packs/day: 0 25   • Years: 25 00   • Pack years: 6 25   • Types: Cigarettes   • Start date: 64   • Quit date: 12   • Years since quittin 2   Smokeless Tobacco Never Family History   Problem Relation Age of Onset   • Heart disease Mother    • Heart disease Father    • Stroke Sister    • Heart disease Sister    • Lung cancer Sister 72   • No Known Problems Daughter    • No Known Problems Maternal Grandmother    • No Known Problems Maternal Grandfather    • No Known Problems Paternal Grandmother    • No Known Problems Paternal Grandfather    • Heart disease Brother    • No Known Problems Maternal Aunt    • No Known Problems Maternal Aunt    • No Known Problems Maternal Aunt    • No Known Problems Maternal Aunt    • No Known Problems Paternal Aunt    • No Known Problems Paternal Aunt    • Colon polyps Neg Hx    • Colon cancer Neg Hx        Meds/Allergies       Current Outpatient Medications:   •  amLODIPine (NORVASC) 5 mg tablet  •  ascorbic acid (VITAMIN C) 500 mg tablet  •  Biotin 5000 MCG CAPS  •  calcium carbonate (OS-BRITTA) 600 MG tablet  •  calcium polycarbophil (FIBERCON) 625 mg tablet  •  cholecalciferol (VITAMIN D3) 1,000 units tablet  •  dexlansoprazole (DEXILANT) 60 MG capsule  •  GUAIFENESIN 1200 PO  •  hydrochlorothiazide (HYDRODIURIL) 25 mg tablet  •  losartan (COZAAR) 100 MG tablet  •  metFORMIN (GLUCOPHAGE-XR) 500 mg 24 hr tablet  •  potassium chloride (K-DUR,KLOR-CON) 20 mEq tablet  •  Qvar RediHaler 40 MCG/ACT inhaler  •  rosuvastatin (CRESTOR) 20 MG tablet  •  SYNTHROID 75 MCG tablet  •  venlafaxine (EFFEXOR-XR) 150 mg 24 hr capsule  •  venlafaxine (EFFEXOR-XR) 75 mg 24 hr capsule  •  albuterol (PROVENTIL HFA,VENTOLIN HFA) 90 mcg/act inhaler  •  benzonatate (TESSALON PERLES) 100 mg capsule  •  budesonide (PULMICORT) 0 5 mg/2 mL nebulizer solution  •  ipratropium-albuterol, FOR EMS ONLY, (DUO-NEB) 0 5-2 5 mg/3 mL nebulizer solution  •  nitroglycerin (NITROSTAT) 0 4 mg SL tablet  •  OZEMPIC, 0 25 OR 0 5 MG/DOSE, 2 MG/1 5ML SOPN    Current Facility-Administered Medications:   •  lactated ringers infusion, 50 mL/hr, Intravenous, Continuous, 50 mL/hr at 03/21/23 1255    Allergies   Allergen Reactions   • Epinephrine Shortness Of Breath and Palpitations   • Carisoprodol Other (See Comments)     Other reaction(s): SOMA (11/04/2010)     • Irbesartan-Hydrochlorothiazide Other (See Comments)     Possible lichen planus   • Lisinopril      Lichen planus   • Other Other (See Comments)     Other reaction(s): Muscle Relaxers (09/03/2014)     • Tizanidine      Altered mental status       Objective     /76   Pulse 71   Temp (!) 97 4 °F (36 3 °C) (Temporal)   Resp (!) 24   Ht 5' 4" (1 626 m)   Wt 69 9 kg (154 lb)   LMP  (LMP Unknown)   SpO2 97%   BMI 26 43 kg/m²     PHYSICAL EXAM    Gen: NAD AAOx3  Head: Normocephalic, Atraumatic  CV: S1S2 RRR no m/r/g  CHEST: Clear b/l no c/r/w  ABD: soft, +BS NT/ND  EXT: no edema    ASSESSMENT/PLAN:  This is a 67y o  year old female here for colonoscopy, and she is stable and optimized for her procedure

## 2023-03-21 NOTE — ANESTHESIA POSTPROCEDURE EVALUATION
Post-Op Assessment Note    CV Status:  Stable  Pain Score: 0    Pain management: adequate     Mental Status:  Alert and awake   Hydration Status:  Euvolemic and stable   PONV Controlled:  None   Airway Patency:  Patent      Post Op Vitals Reviewed: Yes      Staff: CRNA         No notable events documented      /66 (03/21/23 1324)    Temp     Pulse 68 (03/21/23 1324)   Resp 19 (03/21/23 1324)    SpO2 97 % (03/21/23 1324)

## 2023-03-21 NOTE — ANESTHESIA PREPROCEDURE EVALUATION
Procedure:  COLONOSCOPY    Relevant Problems   CARDIO   (+) Essential hypertension   (+) Hyperlipidemia      ENDO   (+) Hypothyroid   (+) Type 2 diabetes mellitus without complication (HCC)      GI/HEPATIC   (+) GERD (gastroesophageal reflux disease)   (+) NAFLD (nonalcoholic fatty liver disease)   (+) Rectal bleeding      NEURO/PSYCH   (+) Major depression, recurrent (HCC)      PULMONARY   (+) Cough variant asthma   (+) Dyspnea        Physical Exam    Airway    Mallampati score: II  TM Distance: >3 FB  Neck ROM: full     Dental   No notable dental hx     Cardiovascular      Pulmonary      Other Findings        Anesthesia Plan  ASA Score- 3     Anesthesia Type- IV sedation with anesthesia with ASA Monitors  Additional Monitors:   Airway Plan:           Plan Factors-Exercise tolerance (METS): >4 METS  Chart reviewed  Patient summary reviewed  Patient is not a current smoker  Induction- intravenous  Postoperative Plan-     Informed Consent- Anesthetic plan and risks discussed with patient  I personally reviewed this patient with the CRNA  Discussed and agreed on the Anesthesia Plan with the CRNA  Sami Olivares

## 2023-05-16 NOTE — PROGRESS NOTES
Name: Isabelle Valderrama      : 1950      MRN: [id number]  Encounter Provider: Guillermo Mcguire DO  Encounter Date: 2023   Encounter department: [address] [place] [address]     1  Type 2 diabetes mellitus without complication, without long-term current use of insulin (HCC)  -     Hemoglobin A1C  -     Comprehensive metabolic panel  -     Albumin / creatinine urine ratio  Lab Results   Component Value Date    HGBA1C 6 7 (H) 2022   foot exam done today  Eye exam up to date  Check labs  2  Hyperlipidemia, unspecified hyperlipidemia type  -     Lipid Panel with Direct LDL reflex  Lab Results   Component Value Date    CHOLESTEROL 141 2022    CHOLESTEROL 149 2022     Lab Results   Component Value Date    HDL 50 2022    HDL 51 2022     Lab Results   Component Value Date    TRIG 100 2022    TRIG 90 2022     No results found for: [place]  Tolerating crestor  Due for labs and ALT  3  Hypothyroidism, unspecified type  -     TSH, 3rd generation with Free T4 reflex; Future  -     TSH, 3rd generation with Free T4 reflex  Lab Results   Component Value Date    TSH 0 761 2022     Due for labs  Ordered today  4  Post-menopausal  -     DXA bone density spine hip and pelvis  Due for dexa  5  Medicare annual wellness visit, subsequent  See separate note  6  Recurrent major depressive disorder, remission status unspecified ([facility])  Stable on effexor xr 225 mg   7  Vitamin D deficiency    8  Gastroesophageal reflux disease, unspecified whether esophagitis present  Stable on dexilant  9  Anxiety  Worsened recently due to some stressors in her life  Multiple friends have passed, trying to settle estate  No change in meds for now  10  Other fatigue  -     CBC and differential; Future  -     CBC and differential  Check CBC, TSH  Denies feeling depressed  Wonder if she may have some sleep apnea   She prefers to hold off on looking into this for now  Await labs  11  Right-sided low back pain with bilateral sciatica, unspecified chronicity  Recommend she start PT  She prefers to go to a location near her home  Will call me with name of location so I can issue referral    Sotero Shepherd her to try and cut down on ibuprofen she is taking as this may affect kidney function  Will apply moist heat to low back  Will make appt to return if the pain does not improve with PT or if it worsens  She agrees to this plan  12  Encounter for immunization  -     Zoster Vac Recomb Adjuvanted (Shingrix) 50 MCG/0 5ML SUSR; Inject 0 5 mL into a muscle once for 1 dose Repeat dose in 2 to 6 months  rx for shingrix sent to local pharmacy  13  Hypogammaglobulinemia [facility])  Per pulmonology consultation note dated 4/29/20 which was reviewed by me after receipt of transferred records on 3/11/22  IgG deficiency  Pulmonology discussed benefits and risks of immunoglobulin therapy  Decided on close observation, treating infections early (by PCP or pulmonology) and if hospitalized for pneumonia, consider IV IgG           Subjective     HPI   Patient is a 67year old female with hypertension, hyperlipidemia, DM2, hypothyroidism, GERD, NAFLD, depression, sarcoidosis, vitamin d deficiency, osteopenia and atherosclerosis who is being seen today for annual medicare wellness exam (see separate note) as well as for f/u on these chronic problems  Patient Active Problem List   Diagnosis   • GERD (gastroesophageal reflux disease)--stable on dexilant   • Rectal bleeding--had colonoscopy on 3/21/23 and was noted to have some internal hemorrhoids  Due to her history of colon polyps it was recommended that she repeat in 5 years  • Abnormal levels of other serum enzymes   • Cough variant asthma   • Current use of proton pump inhibitor--will check dexa   • Deformity of right foot   • Dyspnea   • Essential hypertension--on amlodipine, hctz and losartan      • Generalized atherosclerosis   • Hyperlipidemia--taking and tolerating crestor   • Hypothyroid--stable on synthroid 75 mcg daily  Lab Results   Component Value Date    TSH 0 761 18/47/5931      • Lichen planus   • Major depression, recurrent (HCC)--taking effexor xr 225 mg daily    • NAFLD (nonalcoholic fatty liver disease)   • Osteopenia   • Sarcoidosis   • Solitary pulmonary nodule   • Type 2 diabetes mellitus without complication ([facility] )   • Vitamin D deficiency   • Hypogammaglobulinemia (HCC)--Per pulmonology consultation note dated 4/29/20 which was reviewed by me after receipt of transferred records on 3/11/22  IgG deficiency  Pulmonology discussed benefits and risks of immunoglobulin therapy  Decided on close observation, treating infections early (by PCP or pulmonology) and if hospitalized for pneumonia, consider IV IgG     • Rupture of extensor tendon of foot       Concerns today include:  1  Having right sided low back pain that started 2 weeks ago after lifting heavy bins of clothing  Her pain radiates into right lower extremity  Feels it when she is sitting but seems worsen when she first gets up to walk  Denies any numbness, tingling or weakness in that leg  Taking ibuprofen 600 mg every 6 hours with relief  This is not bothering her stomach  2  Feeling anxious recently  Attributes this to ongoing issues trying to settle estate  3  More tired than usual  Sleeping 10 hours per night  Alert upon awakening  Not sure if she snores  No lightheadedness  No shortness of breath       Review of Systems    Past Medical History:   Diagnosis Date   • Anxiety 03/29/2021   • Cataract 03/29/2021   • cough variant asthma    • COVID-19 03/06/2021    had again 3/29/22 (+antigen test)   • Diabetes mellitus ([facility] )    • Disease of thyroid gland    • Diverticulosis     colonoscopy 7/31/20   • Fatty liver disease, nonalcoholic    • GERD (gastroesophageal reflux disease)     Last EGD 2016   • 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; treated with voriconazole 200 bid x 12 weeks   • Hx of migraine headaches     pt does not have   • Hyperlipidemia    • Hypertension    • Hypogammaglobulinemia ([facility] )    • Pulmonary embolism (HCC)    • Reactive airway disease    • Restless leg syndrome    • Sepsis ([facility] )    • Streptococcal pneumonia ([facility] )    • Tubular adenoma 2020    Dr Mariano Esparza  recall 5 years     Past Surgical History:   Procedure Laterality Date   • BREAST BIOPSY Right     benign   • CARDIAC CATHETERIZATION     • CATARACT EXTRACTION, BILATERAL     • COLONOSCOPY     • CORNEA LACERATION REPAIR Right    • REFRACTIVE SURGERY     • TONSILLECTOMY     • UPPER GASTROINTESTINAL ENDOSCOPY     • WISDOM TOOTH EXTRACTION       Family History   Problem Relation Age of Onset   • Heart disease Mother    • Heart disease Father    • Stroke Sister    • Heart disease Sister    • Lung cancer Sister 72   • No Known Problems Daughter    • No Known Problems Maternal Grandmother    • No Known Problems Maternal Grandfather    • No Known Problems Paternal Grandmother    • No Known Problems Paternal Grandfather    • Heart disease Brother    • No Known Problems Maternal Aunt    • No Known Problems Maternal Aunt    • No Known Problems Maternal Aunt    • No Known Problems Maternal Aunt    • No Known Problems Paternal Aunt    • No Known Problems Paternal Aunt    • Colon polyps Neg Hx    • Colon cancer Neg Hx      Social History     Socioeconomic History   • Marital status:      Spouse name: None   • Number of children: None   • Years of education: None   • Highest education level: None   Occupational History   • None   Tobacco Use   • Smoking status: Former     Packs/day: 0 25     Years: 25 00     Pack years: 6 25     Types: Cigarettes     Start date: 64     Quit date:      Years since quittin 3   • Smokeless tobacco: Never   Vaping Use   • Vaping Use: Never used   Substance and Sexual Activity   • Alcohol use:  Yes     Alcohol/week: 14 0 standard drinks     Types: 14 Glasses of wine per week   • Drug use: Not Currently   • Sexual activity: Not Currently   Other Topics Concern   • None   Social History Narrative        Lives in in-law suite with son and his family    Used to work for [employer] as a     Likes to [place] and fili     Social Determinants of Health     Financial Resource Strain: Low Risk    • Difficulty of Paying Living Expenses: Not hard at all   Food Insecurity: Not on file   Transportation Needs: No Transportation Needs   • Lack of Transportation (Medical): No   • Lack of Transportation (Non-Medical): No   Physical Activity: Not on file   Stress: Not on file   Social Connections: Not on file   Intimate Partner Violence: Not on file   Housing Stability: Not on file     Current Outpatient Medications on File Prior to Visit   Medication Sig   • albuterol (PROVENTIL HFA,VENTOLIN HFA) 90 mcg/act inhaler Inhale 2 puffs 4 (four) times a day   • amLODIPine (NORVASC) 5 mg tablet Take 5 mg by mouth daily     • ascorbic acid (VITAMIN C) 500 mg tablet Take 500 mg by mouth daily   • Biotin 5000 MCG CAPS Take 5,000 mcg by mouth in the morning   • budesonide (PULMICORT) 0 5 mg/2 mL nebulizer solution Take 0 5 mg by nebulization as needed in the morning  Rinse mouth after use       • calcium carbonate (OS-BRITTA) 600 MG tablet Take 600 mg by mouth 2 (two) times a day with meals   • calcium polycarbophil (FIBERCON) 625 mg tablet Take 1 tablet (625 mg total) by mouth daily   • cholecalciferol (VITAMIN D3) 1,000 units tablet Take 1,000 Units by mouth daily   • dexlansoprazole (DEXILANT) 60 MG capsule Take 1 capsule (60 mg total) by mouth daily   • GUAIFENESIN 1200 PO Take 1,500 mg by mouth 2 (two) times a day   • hydrochlorothiazide (HYDRODIURIL) 25 mg tablet TAKE 1 TABLET DAILY   • losartan (COZAAR) 100 MG tablet TAKE 1 TABLET DAILY   • metFORMIN (GLUCOPHAGE-XR) 500 mg 24 hr tablet Take 1,000 mg by mouth daily   • OZEMPIC, 0 25 OR "0 5 MG/DOSE, 2 MG/1 5ML SOPN    • potassium chloride (K-DUR,KLOR-CON) 20 mEq tablet Take 20 mEq by mouth daily     • Qvar RediHaler 40 MCG/ACT inhaler Inhale 2 puffs 2 (two) times a day   • rosuvastatin (CRESTOR) 20 MG tablet Take 20 mg by mouth daily     • SYNTHROID 75 MCG tablet Take 75 mcg by mouth daily in the early morning     • venlafaxine (EFFEXOR-XR) 150 mg 24 hr capsule Take 150 mg by mouth every other day   • venlafaxine (EFFEXOR-XR) 75 mg 24 hr capsule Take 75 mg by mouth every other day   • benzonatate (TESSALON PERLES) 100 mg capsule Take 100 mg by mouth 3 (three) times a day as needed for cough (Patient not taking: Reported on 5/17/2023)   • ipratropium-albuterol, FOR EMS ONLY, (DUO-NEB) 0 5-2 5 mg/3 mL nebulizer solution Use 3 mL as needed   (Patient not taking: Reported on 5/17/2023)   • nitroglycerin (NITROSTAT) 0 4 mg SL tablet  (Patient not taking: Reported on 5/17/2023)     Allergies   Allergen Reactions   • Epinephrine Shortness Of Breath and Palpitations   • Carisoprodol Other (See Comments)     Other reaction(s): SOMA (11/04/2010)     • Irbesartan-Hydrochlorothiazide Other (See Comments)     Possible lichen planus   • Lisinopril      Lichen planus   • Other Other (See Comments)     Other reaction(s): Muscle Relaxers (09/03/2014)     • Tizanidine      Altered mental status     Immunization History   Administered Date(s) Administered   • Hep A, adult 03/02/2005   • INFLUENZA 02/09/2010, 10/14/2011, 09/28/2016, 10/11/2017   • Influenza Split High Dose Preservative Free IM 11/20/2019   • Influenza, high dose seasonal 0 7 mL 11/17/2022   • Pneumococcal Conjugate Vaccine 20-valent (Pcv20), Polysace 05/17/2022   • Pneumococcal Polysaccharide PPV23 01/10/2006, 01/01/2014   • Td (adult), adsorbed 03/08/2002, 10/13/2003   • Tdap 10/06/2010       Objective     /72 (BP Location: Left arm, Patient Position: Sitting, Cuff Size: Standard)   Pulse 69   Temp 98 °F (36 7 °C) (Tympanic)   Ht 5' 4\" " (1 626 m)   Wt 70 8 kg (156 lb)   LMP  (LMP Unknown)   SpO2 96%   BMI 26 78 kg/m²     Physical Exam  Vitals and nursing note reviewed  Constitutional:       General: She is not in acute distress  Appearance: Normal appearance  She is not ill-appearing, toxic-appearing or diaphoretic  HENT:      Head: Normocephalic and atraumatic  Right Ear: Tympanic membrane normal       Left Ear: Tympanic membrane normal       Nose: Nose normal       Mouth/Throat:      Mouth: Mucous membranes are moist    Eyes:      Extraocular Movements: Extraocular movements intact  Conjunctiva/sclera: Conjunctivae normal       Pupils: Pupils are equal, round, and reactive to light  Neck:      Vascular: No carotid bruit  Cardiovascular:      Rate and Rhythm: Normal rate and regular rhythm  Pulses: Normal pulses  Heart sounds: No murmur heard  Pulmonary:      Effort: Pulmonary effort is normal       Breath sounds: Normal breath sounds  Abdominal:      General: Abdomen is flat  Bowel sounds are normal  There is no distension  Palpations: Abdomen is soft  There is no mass  Tenderness: There is no abdominal tenderness  Musculoskeletal:      Cervical back: Normal range of motion and neck supple  Right lower leg: No edema  Left lower leg: No edema  Comments: Lumbar spine with no deformity  Full range of motion  No tenderness on palpation  Negative SLR  DTR 2/4 bilateral lower extremities   Lymphadenopathy:      Cervical: No cervical adenopathy  Skin:     General: Skin is warm and dry  Findings: No rash  Neurological:      General: No focal deficit present  Mental Status: She is alert and oriented to person, place, and time  Motor: No weakness        Coordination: Coordination normal       Gait: Gait normal       Deep Tendon Reflexes: Reflexes normal    Psychiatric:         Mood and Affect: Mood normal        Virginia Sykes DO

## 2023-05-17 ENCOUNTER — OFFICE VISIT (OUTPATIENT)
Dept: FAMILY MEDICINE CLINIC | Facility: CLINIC | Age: 73
End: 2023-05-17

## 2023-05-17 VITALS
BODY MASS INDEX: 26.63 KG/M2 | HEART RATE: 69 BPM | TEMPERATURE: 98 F | WEIGHT: 156 LBS | OXYGEN SATURATION: 96 % | HEIGHT: 64 IN | DIASTOLIC BLOOD PRESSURE: 72 MMHG | SYSTOLIC BLOOD PRESSURE: 128 MMHG

## 2023-05-17 DIAGNOSIS — M54.42 RIGHT-SIDED LOW BACK PAIN WITH BILATERAL SCIATICA, UNSPECIFIED CHRONICITY: ICD-10-CM

## 2023-05-17 DIAGNOSIS — M54.41 RIGHT-SIDED LOW BACK PAIN WITH BILATERAL SCIATICA, UNSPECIFIED CHRONICITY: ICD-10-CM

## 2023-05-17 DIAGNOSIS — Z00.00 MEDICARE ANNUAL WELLNESS VISIT, SUBSEQUENT: ICD-10-CM

## 2023-05-17 DIAGNOSIS — F33.9 RECURRENT MAJOR DEPRESSIVE DISORDER, REMISSION STATUS UNSPECIFIED (HCC): ICD-10-CM

## 2023-05-17 DIAGNOSIS — Z78.0 POST-MENOPAUSAL: ICD-10-CM

## 2023-05-17 DIAGNOSIS — Z23 ENCOUNTER FOR IMMUNIZATION: ICD-10-CM

## 2023-05-17 DIAGNOSIS — F41.9 ANXIETY: ICD-10-CM

## 2023-05-17 DIAGNOSIS — E55.9 VITAMIN D DEFICIENCY: ICD-10-CM

## 2023-05-17 DIAGNOSIS — R53.83 OTHER FATIGUE: ICD-10-CM

## 2023-05-17 DIAGNOSIS — K21.9 GASTROESOPHAGEAL REFLUX DISEASE, UNSPECIFIED WHETHER ESOPHAGITIS PRESENT: ICD-10-CM

## 2023-05-17 DIAGNOSIS — E78.5 HYPERLIPIDEMIA, UNSPECIFIED HYPERLIPIDEMIA TYPE: ICD-10-CM

## 2023-05-17 DIAGNOSIS — D80.1 HYPOGAMMAGLOBULINEMIA (HCC): ICD-10-CM

## 2023-05-17 DIAGNOSIS — E11.9 TYPE 2 DIABETES MELLITUS WITHOUT COMPLICATION, WITHOUT LONG-TERM CURRENT USE OF INSULIN (HCC): Primary | ICD-10-CM

## 2023-05-17 DIAGNOSIS — E03.9 HYPOTHYROIDISM, UNSPECIFIED TYPE: ICD-10-CM

## 2023-05-17 RX ORDER — ZOSTER VACCINE RECOMBINANT, ADJUVANTED 50 MCG/0.5
0.5 KIT INTRAMUSCULAR ONCE
Qty: 1 EACH | Refills: 1 | Status: SHIPPED | OUTPATIENT
Start: 2023-05-17 | End: 2023-05-17

## 2023-05-17 NOTE — PATIENT INSTRUCTIONS
Call me with name of physical therapy location you are interested in going to  Medicare Preventive Visit Patient Instructions  Thank you for completing your Welcome to Medicare Visit or Medicare Annual Wellness Visit today  Your next wellness visit will be due in one year (5/17/2024)  The screening/preventive services that you may require over the next 5-10 years are detailed below  Some tests may not apply to you based off risk factors and/or age  Screening tests ordered at today's visit but not completed yet may show as past due  Also, please note that scanned in results may not display below  Preventive Screenings:  Service Recommendations Previous Testing/Comments   Colorectal Cancer Screening  * Colonoscopy    * Fecal Occult Blood Test (FOBT)/Fecal Immunochemical Test (FIT)  * Fecal DNA/Cologuard Test  * Flexible Sigmoidoscopy Age: 39-70 years old   Colonoscopy: every 10 years (may be performed more frequently if at higher risk)  OR  FOBT/FIT: every 1 year  OR  Cologuard: every 3 years  OR  Sigmoidoscopy: every 5 years  Screening may be recommended earlier than age 39 if at higher risk for colorectal cancer  Also, an individualized decision between you and your healthcare provider will decide whether screening between the ages of 74-80 would be appropriate  Colonoscopy: 03/21/2023  FOBT/FIT: Not on file  Cologuard: Not on file  Sigmoidoscopy: Not on file          Breast Cancer Screening Age: 36 years old  Frequency: every 1-2 years  Not required if history of left and right mastectomy Mammogram: 03/09/2023        Cervical Cancer Screening Between the ages of 21-29, pap smear recommended once every 3 years  Between the ages of 33-67, can perform pap smear with HPV co-testing every 5 years     Recommendations may differ for women with a history of total hysterectomy, cervical cancer, or abnormal pap smears in past  Pap Smear: Not on file        Hepatitis C Screening Once for adults born between 1945 and 1965  More frequently in patients at high risk for Hepatitis C Hep C Antibody: Not on file        Diabetes Screening 1-2 times per year if you're at risk for diabetes or have pre-diabetes Fasting glucose: No results in last 5 years (No results in last 5 years)  A1C: 6 7 % (11/16/2022)      Cholesterol Screening Once every 5 years if you don't have a lipid disorder  May order more often based on risk factors  Lipid panel: 11/16/2022          Other Preventive Screenings Covered by Medicare:  Abdominal Aortic Aneurysm (AAA) Screening: covered once if your at risk  You're considered to be at risk if you have a family history of AAA  Lung Cancer Screening: covers low dose CT scan once per year if you meet all of the following conditions: (1) Age 50-69; (2) No signs or symptoms of lung cancer; (3) Current smoker or have quit smoking within the last 15 years; (4) You have a tobacco smoking history of at least 20 pack years (packs per day multiplied by number of years you smoked); (5) You get a written order from a healthcare provider  Glaucoma Screening: covered annually if you're considered high risk: (1) You have diabetes OR (2) Family history of glaucoma OR (3)  aged 48 and older OR (3)  American aged 72 and older  Osteoporosis Screening: covered every 2 years if you meet one of the following conditions: (1) You're estrogen deficient and at risk for osteoporosis based off medical history and other findings; (2) Have a vertebral abnormality; (3) On glucocorticoid therapy for more than 3 months; (4) Have primary hyperparathyroidism; (5) On osteoporosis medications and need to assess response to drug therapy  Last bone density test (DXA Scan): Not on file  HIV Screening: covered annually if you're between the age of 12-76  Also covered annually if you are younger than 13 and older than 72 with risk factors for HIV infection   For pregnant patients, it is covered up to 3 times per pregnancy  Immunizations:  Immunization Recommendations   Influenza Vaccine Annual influenza vaccination during flu season is recommended for all persons aged >= 6 months who do not have contraindications   Pneumococcal Vaccine   * Pneumococcal conjugate vaccine = PCV13 (Prevnar 13), PCV15 (Vaxneuvance), PCV20 (Prevnar 20)  * Pneumococcal polysaccharide vaccine = PPSV23 (Pneumovax) Adults 25-60 years old: 1-3 doses may be recommended based on certain risk factors  Adults 72 years old: 1-2 doses may be recommended based off what pneumonia vaccine you previously received   Hepatitis B Vaccine 3 dose series if at intermediate or high risk (ex: diabetes, end stage renal disease, liver disease)   Tetanus (Td) Vaccine - COST NOT COVERED BY MEDICARE PART B Following completion of primary series, a booster dose should be given every 10 years to maintain immunity against tetanus  Td may also be given as tetanus wound prophylaxis  Tdap Vaccine - COST NOT COVERED BY MEDICARE PART B Recommended at least once for all adults  For pregnant patients, recommended with each pregnancy  Shingles Vaccine (Shingrix) - COST NOT COVERED BY MEDICARE PART B  2 shot series recommended in those aged 48 and above     Health Maintenance Due:      Topic Date Due    Hepatitis C Screening  Never done    Breast Cancer Screening: Mammogram  03/09/2024    Colorectal Cancer Screening  03/19/2028     Immunizations Due:      Topic Date Due    COVID-19 Vaccine (1) Never done     Advance Directives   What are advance directives? Advance directives are legal documents that state your wishes and plans for medical care  These plans are made ahead of time in case you lose your ability to make decisions for yourself  Advance directives can apply to any medical decision, such as the treatments you want, and if you want to donate organs  What are the types of advance directives?   There are many types of advance directives, and each state has rules about how to use them  You may choose a combination of any of the following:  Living will: This is a written record of the treatment you want  You can also choose which treatments you do not want, which to limit, and which to stop at a certain time  This includes surgery, medicine, IV fluid, and tube feedings  Durable power of  for healthcare [facility]): This is a written record that states who you want to make healthcare choices for you when you are unable to make them for yourself  This person, called a proxy, is usually a family member or a friend  You may choose more than 1 proxy  Do not resuscitate (DNR) order:  A DNR order is used in case your heart stops beating or you stop breathing  It is a request not to have certain forms of treatment, such as CPR  A DNR order may be included in other types of advance directives  Medical directive: This covers the care that you want if you are in a coma, near death, or unable to make decisions for yourself  You can list the treatments you want for each condition  Treatment may include pain medicine, surgery, blood transfusions, dialysis, IV or tube feedings, and a ventilator (breathing machine)  Values history: This document has questions about your views, beliefs, and how you feel and think about life  This information can help others choose the care that you would choose  Why are advance directives important? An advance directive helps you control your care  Although spoken wishes may be used, it is better to have your wishes written down  Spoken wishes can be misunderstood, or not followed  Treatments may be given even if you do not want them  An advance directive may make it easier for your family to make difficult choices about your care  Weight Management   Why it is important to manage your weight:  Being overweight increases your risk of health conditions such as heart disease, high blood pressure, type 2 diabetes, and certain types of cancer   It can also increase your risk for osteoarthritis, sleep apnea, and other respiratory problems  Aim for a slow, steady weight loss  Even a small amount of weight loss can lower your risk of health problems  How to lose weight safely:  A safe and healthy way to lose weight is to eat fewer calories and get regular exercise  You can lose up about 1 pound a week by decreasing the number of calories you eat by 500 calories each day  Healthy meal plan for weight management:  A healthy meal plan includes a variety of foods, contains fewer calories, and helps you stay healthy  A healthy meal plan includes the following:  Eat whole-grain foods more often  A healthy meal plan should contain fiber  Fiber is the part of grains, fruits, and vegetables that is not broken down by your body  Whole-grain foods are healthy and provide extra fiber in your diet  Some examples of whole-grain foods are whole-wheat breads and pastas, oatmeal, brown rice, and bulgur  Eat a variety of vegetables every day  Include dark, leafy greens such as spinach, kale, girish greens, and mustard greens  Eat yellow and orange vegetables such as carrots, sweet potatoes, and winter squash  Eat a variety of fruits every day  Choose fresh or canned fruit (canned in its own juice or light syrup) instead of juice  Fruit juice has very little or no fiber  Eat low-fat dairy foods  Drink fat-free (skim) milk or 1% milk  Eat fat-free yogurt and low-fat cottage cheese  Try low-fat cheeses such as mozzarella and other reduced-fat cheeses  Choose meat and other protein foods that are low in fat  Choose beans or other legumes such as split peas or lentils  Choose fish, skinless poultry (chicken or turkey), or lean cuts of red meat (beef or pork)  Before you cook meat or poultry, cut off any visible fat  Use less fat and oil  Try baking foods instead of frying them   Add less fat, such as margarine, sour cream, regular salad dressing and mayonnaise to foods  Eat fewer high-fat foods  Some examples of high-fat foods include french fries, doughnuts, ice cream, and cakes  Eat fewer sweets  Limit foods and drinks that are high in sugar  This includes candy, cookies, regular soda, and sweetened drinks  Exercise:  Exercise at least 30 minutes per day on most days of the week  Some examples of exercise include walking, biking, dancing, and swimming  You can also fit in more physical activity by taking the stairs instead of the elevator or parking farther away from stores  Ask your healthcare provider about the best exercise plan for you  © Copyright [employer] 2018 Information is for End User's use only and may not be sold, redistributed or otherwise used for commercial purposes  All illustrations and images included in CareNotes® are the copyrighted property of [employer]  or [facility] Preventive Visit Patient Instructions  Thank you for completing your Welcome to Medicare Visit or Medicare Annual Wellness Visit today  Your next wellness visit will be due in one year (5/17/2024)  The screening/preventive services that you may require over the next 5-10 years are detailed below  Some tests may not apply to you based off risk factors and/or age  Screening tests ordered at today's visit but not completed yet may show as past due  Also, please note that scanned in results may not display below  Preventive Screenings:  Service Recommendations Previous Testing/Comments   Colorectal Cancer Screening  * Colonoscopy    * Fecal Occult Blood Test (FOBT)/Fecal Immunochemical Test (FIT)  * Fecal DNA/Cologuard Test  * Flexible Sigmoidoscopy Age: 39-70 years old   Colonoscopy: every 10 years (may be performed more frequently if at higher risk)  OR  FOBT/FIT: every 1 year  OR  Cologuard: every 3 years  OR  Sigmoidoscopy: every 5 years  Screening may be recommended earlier than age 39 if at higher risk for colorectal cancer   Also, an individualized decision between you and your healthcare provider will decide whether screening between the ages of 74-80 would be appropriate  Colonoscopy: 03/21/2023  FOBT/FIT: Not on file  Cologuard: Not on file  Sigmoidoscopy: Not on file    Screening Current     Breast Cancer Screening Age: 36 years old  Frequency: every 1-2 years  Not required if history of left and right mastectomy Mammogram: 03/09/2023    Screening Current   Cervical Cancer Screening Between the ages of 21-29, pap smear recommended once every 3 years  Between the ages of 33-67, can perform pap smear with HPV co-testing every 5 years  Recommendations may differ for women with a history of total hysterectomy, cervical cancer, or abnormal pap smears in past  Pap Smear: Not on file    Screening Not Indicated   Hepatitis C Screening Once for adults born between 1945 and 1965  More frequently in patients at high risk for Hepatitis C Hep C Antibody: Not on file        Diabetes Screening 1-2 times per year if you're at risk for diabetes or have pre-diabetes Fasting glucose: No results in last 5 years (No results in last 5 years)  A1C: 6 7 % (11/16/2022)  Screening Not Indicated  History Diabetes   Cholesterol Screening Once every 5 years if you don't have a lipid disorder  May order more often based on risk factors  Lipid panel: 11/16/2022    Screening Not Indicated  History Lipid Disorder     Other Preventive Screenings Covered by Medicare:  Abdominal Aortic Aneurysm (AAA) Screening: covered once if your at risk  You're considered to be at risk if you have a family history of AAA    Lung Cancer Screening: covers low dose CT scan once per year if you meet all of the following conditions: (1) Age 50-69; (2) No signs or symptoms of lung cancer; (3) Current smoker or have quit smoking within the last 15 years; (4) You have a tobacco smoking history of at least 20 pack years (packs per day multiplied by number of years you smoked); (5) You get a written order from a healthcare provider  Glaucoma Screening: covered annually if you're considered high risk: (1) You have diabetes OR (2) Family history of glaucoma OR (3)  aged 48 and older OR (3)  American aged 72 and older  Osteoporosis Screening: covered every 2 years if you meet one of the following conditions: (1) You're estrogen deficient and at risk for osteoporosis based off medical history and other findings; (2) Have a vertebral abnormality; (3) On glucocorticoid therapy for more than 3 months; (4) Have primary hyperparathyroidism; (5) On osteoporosis medications and need to assess response to drug therapy  Last bone density test (DXA Scan): Not on file  HIV Screening: covered annually if you're between the age of 12-76  Also covered annually if you are younger than 13 and older than 72 with risk factors for HIV infection  For pregnant patients, it is covered up to 3 times per pregnancy  Immunizations:  Immunization Recommendations   Influenza Vaccine Annual influenza vaccination during flu season is recommended for all persons aged >= 6 months who do not have contraindications   Pneumococcal Vaccine   * Pneumococcal conjugate vaccine = PCV13 (Prevnar 13), PCV15 (Vaxneuvance), PCV20 (Prevnar 20)  * Pneumococcal polysaccharide vaccine = PPSV23 (Pneumovax) Adults 25-60 years old: 1-3 doses may be recommended based on certain risk factors  Adults 72 years old: 1-2 doses may be recommended based off what pneumonia vaccine you previously received   Hepatitis B Vaccine 3 dose series if at intermediate or high risk (ex: diabetes, end stage renal disease, liver disease)   Tetanus (Td) Vaccine - COST NOT COVERED BY MEDICARE PART B Following completion of primary series, a booster dose should be given every 10 years to maintain immunity against tetanus  Td may also be given as tetanus wound prophylaxis  Tdap Vaccine - COST NOT COVERED BY MEDICARE PART B Recommended at least once for all adults   For pregnant patients, recommended with each pregnancy  Shingles Vaccine (Shingrix) - COST NOT COVERED BY MEDICARE PART B  2 shot series recommended in those aged 48 and above     Health Maintenance Due:      Topic Date Due    Hepatitis C Screening  Never done    Breast Cancer Screening: Mammogram  03/09/2024    Colorectal Cancer Screening  03/19/2028     Immunizations Due:      Topic Date Due    COVID-19 Vaccine (1) Never done     Advance Directives   What are advance directives? Advance directives are legal documents that state your wishes and plans for medical care  These plans are made ahead of time in case you lose your ability to make decisions for yourself  Advance directives can apply to any medical decision, such as the treatments you want, and if you want to donate organs  What are the types of advance directives? There are many types of advance directives, and each state has rules about how to use them  You may choose a combination of any of the following:  Living will: This is a written record of the treatment you want  You can also choose which treatments you do not want, which to limit, and which to stop at a certain time  This includes surgery, medicine, IV fluid, and tube feedings  Durable power of  for healthcare [facility]): This is a written record that states who you want to make healthcare choices for you when you are unable to make them for yourself  This person, called a proxy, is usually a family member or a friend  You may choose more than 1 proxy  Do not resuscitate (DNR) order:  A DNR order is used in case your heart stops beating or you stop breathing  It is a request not to have certain forms of treatment, such as CPR  A DNR order may be included in other types of advance directives  Medical directive: This covers the care that you want if you are in a coma, near death, or unable to make decisions for yourself  You can list the treatments you want for each condition  Treatment may include pain medicine, surgery, blood transfusions, dialysis, IV or tube feedings, and a ventilator (breathing machine)  Values history: This document has questions about your views, beliefs, and how you feel and think about life  This information can help others choose the care that you would choose  Why are advance directives important? An advance directive helps you control your care  Although spoken wishes may be used, it is better to have your wishes written down  Spoken wishes can be misunderstood, or not followed  Treatments may be given even if you do not want them  An advance directive may make it easier for your family to make difficult choices about your care  Weight Management   Why it is important to manage your weight:  Being overweight increases your risk of health conditions such as heart disease, high blood pressure, type 2 diabetes, and certain types of cancer  It can also increase your risk for osteoarthritis, sleep apnea, and other respiratory problems  Aim for a slow, steady weight loss  Even a small amount of weight loss can lower your risk of health problems  How to lose weight safely:  A safe and healthy way to lose weight is to eat fewer calories and get regular exercise  You can lose up about 1 pound a week by decreasing the number of calories you eat by 500 calories each day  Healthy meal plan for weight management:  A healthy meal plan includes a variety of foods, contains fewer calories, and helps you stay healthy  A healthy meal plan includes the following:  Eat whole-grain foods more often  A healthy meal plan should contain fiber  Fiber is the part of grains, fruits, and vegetables that is not broken down by your body  Whole-grain foods are healthy and provide extra fiber in your diet  Some examples of whole-grain foods are whole-wheat breads and pastas, oatmeal, brown rice, and bulgur  Eat a variety of vegetables every day    Include dark, leafy greens such as spinach, kale, girish greens, and mustard greens  Eat yellow and orange vegetables such as carrots, sweet potatoes, and winter squash  Eat a variety of fruits every day  Choose fresh or canned fruit (canned in its own juice or light syrup) instead of juice  Fruit juice has very little or no fiber  Eat low-fat dairy foods  Drink fat-free (skim) milk or 1% milk  Eat fat-free yogurt and low-fat cottage cheese  Try low-fat cheeses such as mozzarella and other reduced-fat cheeses  Choose meat and other protein foods that are low in fat  Choose beans or other legumes such as split peas or lentils  Choose fish, skinless poultry (chicken or turkey), or lean cuts of red meat (beef or pork)  Before you cook meat or poultry, cut off any visible fat  Use less fat and oil  Try baking foods instead of frying them  Add less fat, such as margarine, sour cream, regular salad dressing and mayonnaise to foods  Eat fewer high-fat foods  Some examples of high-fat foods include french fries, doughnuts, ice cream, and cakes  Eat fewer sweets  Limit foods and drinks that are high in sugar  This includes candy, cookies, regular soda, and sweetened drinks  Exercise:  Exercise at least 30 minutes per day on most days of the week  Some examples of exercise include walking, biking, dancing, and swimming  You can also fit in more physical activity by taking the stairs instead of the elevator or parking farther away from stores  Ask your healthcare provider about the best exercise plan for you  © Copyright [employer] 2018 Information is for End User's use only and may not be sold, redistributed or otherwise used for commercial purposes   All illustrations and images included in CareNotes® are the copyrighted property of [employer]  or [address] [place] [address] [employer]

## 2023-05-17 NOTE — PROGRESS NOTES
Diabetic Foot Exam    Patient's shoes and socks removed  Right Foot/Ankle   Right Foot Inspection  Skin Exam: skin normal, skin intact and dry skin  No warmth, no callus, no erythema, no maceration, no abnormal color, no pre-ulcer, no ulcer and no callus  Toe Exam: ROM and strength within normal limits and right toe deformity  No swelling, no tenderness and erythema    Sensory   Vibration: diminished  Proprioception: intact  Monofilament testing: diminished    Vascular  Capillary refills: < 3 seconds  The right DP pulse is 2+  The right PT pulse is 2+  Left Foot/Ankle  Left Foot Inspection  Skin Exam: skin normal, skin intact and dry skin  No warmth, no erythema, no maceration, normal color, no pre-ulcer, no ulcer and no callus  Toe Exam: ROM and strength within normal limits  No swelling, no tenderness, no erythema and no left toe deformity       Sensory   Vibration: intact  Proprioception: intact  Monofilament testing: intact    Assign Risk Category  No deformity present  Loss of protective sensation  No weak pulses  Risk: 1     Assessment and Plan:     Problem List Items Addressed This Visit        Digestive    GERD (gastroesophageal reflux disease)       Endocrine    Hypothyroid    Relevant Orders    TSH, 3rd generation with Free T4 reflex    Type 2 diabetes mellitus without complication ([facility] [address] ) - Primary    Relevant Orders    Hemoglobin A1C    Comprehensive metabolic panel    Albumin / creatinine urine ratio       Other    Hyperlipidemia    Relevant Orders    Lipid Panel with Direct LDL reflex    Major depression, recurrent (HCC)    Vitamin D deficiency    Hypogammaglobulinemia ([address] )   Other Visit Diagnoses     Post-menopausal        Relevant Orders    DXA bone density spine hip and pelvis    Medicare annual wellness visit, subsequent        Anxiety        Other fatigue        Relevant Orders    CBC and differential    Right-sided low back pain with bilateral sciatica, unspecified chronicity Encounter for immunization        Relevant Medications    Zoster Vac Recomb Adjuvanted (Shingrix) 50 MCG/0 5ML SUSR           Preventive health issues were discussed with patient, and age appropriate screening tests were ordered as noted in patient's After Visit Summary  Personalized health advice and appropriate referrals for health education or preventive services given if needed, as noted in patient's After Visit Summary       History of Present Illness:     Patient presents for a Medicare Wellness Visit    HPI   Patient Care Team:  Reva Felix DO as PCP - General (Family Medicine)  Reva Felix DO as PCP - [address] [place] [address] (RTE)     Review of Systems:     Review of Systems     Problem List:     Patient Active Problem List   Diagnosis   • GERD (gastroesophageal reflux disease)   • Rectal bleeding   • Abnormal levels of other serum enzymes   • Cough variant asthma   • Current use of proton pump inhibitor   • Deformity of right foot   • Dyspnea   • Essential hypertension   • Generalized atherosclerosis   • Hyperlipidemia   • Hypothyroid   • Lichen planus   • Major depression, recurrent (HCC)   • NAFLD (nonalcoholic fatty liver disease)   • Osteopenia   • Sarcoidosis   • Solitary pulmonary nodule   • Type 2 diabetes mellitus without complication ([facility] [address] )   • Vitamin D deficiency   • Hypogammaglobulinemia ([address] )   • Rupture of extensor tendon of foot      Past Medical and Surgical History:     Past Medical History:   Diagnosis Date   • Anxiety 03/29/2021   • Cataract 03/29/2021   • cough variant asthma    • COVID-19 03/06/2021    had again 3/29/22 (+antigen test)   • Diabetes mellitus ([facility] [address] )    • Disease of thyroid gland    • Diverticulosis     colonoscopy 7/31/20   • Fatty liver disease, nonalcoholic    • GERD (gastroesophageal reflux disease)     Last EGD 2016   • 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; treated with voriconazole 200 bid x 12 weeks   • Hx of migraine headaches pt does not have   • Hyperlipidemia    • Hypertension    • Hypogammaglobulinemia ([facility] [address] )    • Pulmonary embolism (HCC)    • Reactive airway disease    • Restless leg syndrome    • Sepsis (HCC)    • Streptococcal pneumonia (HCC)    • Tubular adenoma 2020    Dr Flaco Nur  recall 5 years     Past Surgical History:   Procedure Laterality Date   • BREAST BIOPSY Right     benign   • CARDIAC CATHETERIZATION     • CATARACT EXTRACTION, BILATERAL     • COLONOSCOPY     • CORNEA LACERATION REPAIR Right    • REFRACTIVE SURGERY     • TONSILLECTOMY     • UPPER GASTROINTESTINAL ENDOSCOPY     • WISDOM TOOTH EXTRACTION        Family History:     Family History   Problem Relation Age of Onset   • Heart disease Mother    • Heart disease Father    • Stroke Sister    • Heart disease Sister    • Lung cancer Sister 72   • No Known Problems Daughter    • No Known Problems Maternal Grandmother    • No Known Problems Maternal Grandfather    • No Known Problems Paternal Grandmother    • No Known Problems Paternal Grandfather    • Heart disease Brother    • No Known Problems Maternal Aunt    • No Known Problems Maternal Aunt    • No Known Problems Maternal Aunt    • No Known Problems Maternal Aunt    • No Known Problems Paternal Aunt    • No Known Problems Paternal Aunt    • Colon polyps Neg Hx    • Colon cancer Neg Hx       Social History:     Social History     Socioeconomic History   • Marital status:      Spouse name: None   • Number of children: None   • Years of education: None   • Highest education level: None   Occupational History   • None   Tobacco Use   • Smoking status: Former     Packs/day: 0 25     Years: 25 00     Pack years: 6 25     Types: Cigarettes     Start date:      Quit date:      Years since quittin 3   • Smokeless tobacco: Never   Vaping Use   • Vaping Use: Never used   Substance and Sexual Activity   • Alcohol use:  Yes     Alcohol/week: 14 0 standard drinks     Types: 14 Glasses of wine per week   • Drug use: Not Currently   • Sexual activity: Not Currently   Other Topics Concern   • None   Social History Narrative        Lives in in-law suite with son and his family    Used to work for [employer] as a     Likes to Delroy and fili     Social Determinants of Health     Financial Resource Strain: Low Risk    • Difficulty of Paying Living Expenses: Not hard at all   Food Insecurity: Not on file   Transportation Needs: No Transportation Needs   • Lack of Transportation (Medical): No   • Lack of Transportation (Non-Medical): No   Physical Activity: Not on file   Stress: Not on file   Social Connections: Not on file   Intimate Partner Violence: Not on file   Housing Stability: Not on file      Medications and Allergies:     Current Outpatient Medications   Medication Sig Dispense Refill   • albuterol (PROVENTIL HFA,VENTOLIN HFA) 90 mcg/act inhaler Inhale 2 puffs 4 (four) times a day     • amLODIPine (NORVASC) 5 mg tablet Take 5 mg by mouth daily       • ascorbic acid (VITAMIN C) 500 mg tablet Take 500 mg by mouth daily     • Biotin 5000 MCG CAPS Take 5,000 mcg by mouth in the morning     • budesonide (PULMICORT) 0 5 mg/2 mL nebulizer solution Take 0 5 mg by nebulization as needed in the morning  Rinse mouth after use         • calcium carbonate (OS-BRITTA) 600 MG tablet Take 600 mg by mouth 2 (two) times a day with meals     • calcium polycarbophil (FIBERCON) 625 mg tablet Take 1 tablet (625 mg total) by mouth daily 30 tablet 11   • cholecalciferol (VITAMIN D3) 1,000 units tablet Take 1,000 Units by mouth daily     • dexlansoprazole (DEXILANT) 60 MG capsule Take 1 capsule (60 mg total) by mouth daily 90 capsule 1   • GUAIFENESIN 1200 PO Take 1,500 mg by mouth 2 (two) times a day     • hydrochlorothiazide (HYDRODIURIL) 25 mg tablet TAKE 1 TABLET DAILY 90 tablet 3   • losartan (COZAAR) 100 MG tablet TAKE 1 TABLET DAILY 90 tablet 3   • metFORMIN (GLUCOPHAGE-XR) 500 mg 24 hr tablet Take 1,000 mg by mouth daily     • OZEMPIC, 0 25 OR 0 5 MG/DOSE, 2 MG/1 5ML SOPN      • potassium chloride (K-DUR,KLOR-CON) 20 mEq tablet Take 20 mEq by mouth daily       • Qvar RediHaler 40 MCG/ACT inhaler Inhale 2 puffs 2 (two) times a day     • rosuvastatin (CRESTOR) 20 MG tablet Take 20 mg by mouth daily       • SYNTHROID 75 MCG tablet Take 75 mcg by mouth daily in the early morning       • venlafaxine (EFFEXOR-XR) 150 mg 24 hr capsule Take 150 mg by mouth every other day     • venlafaxine (EFFEXOR-XR) 75 mg 24 hr capsule Take 75 mg by mouth every other day     • Zoster Vac Recomb Adjuvanted (Shingrix) 50 MCG/0 5ML SUSR Inject 0 5 mL into a muscle once for 1 dose Repeat dose in 2 to 6 months 1 each 1   • benzonatate (TESSALON PERLES) 100 mg capsule Take 100 mg by mouth 3 (three) times a day as needed for cough (Patient not taking: Reported on 5/17/2023)     • ipratropium-albuterol, FOR EMS ONLY, (DUO-NEB) 0 5-2 5 mg/3 mL nebulizer solution Use 3 mL as needed   (Patient not taking: Reported on 5/17/2023)     • nitroglycerin (NITROSTAT) 0 4 mg SL tablet  (Patient not taking: Reported on 5/17/2023)       No current facility-administered medications for this visit       Allergies   Allergen Reactions   • Epinephrine Shortness Of Breath and Palpitations   • Carisoprodol Other (See Comments)     Other reaction(s): SOMA (11/04/2010)     • Irbesartan-Hydrochlorothiazide Other (See Comments)     Possible lichen planus   • Lisinopril      Lichen planus   • Other Other (See Comments)     Other reaction(s): Muscle Relaxers (09/03/2014)     • Tizanidine      Altered mental status      Immunizations:     Immunization History   Administered Date(s) Administered   • Hep A, adult 03/02/2005   • INFLUENZA 02/09/2010, 10/14/2011, 09/28/2016, 10/11/2017   • Influenza Split High Dose Preservative Free IM 11/20/2019   • Influenza, high dose seasonal 0 7 mL 11/17/2022   • Pneumococcal Conjugate Vaccine 20-valent (Pcv20), Polysace "05/17/2022   • Pneumococcal Polysaccharide PPV23 01/10/2006, 01/01/2014   • Td (adult), adsorbed 03/08/2002, 10/13/2003   • Tdap 10/06/2010      Health Maintenance:         Topic Date Due   • Hepatitis C Screening  05/17/2024 (Originally 1950)   • Breast Cancer Screening: Mammogram  03/09/2024   • Colorectal Cancer Screening  03/19/2028         Topic Date Due   • COVID-19 Vaccine (1) Never done      Medicare Screening Tests and Risk Assessments:     Benny Paz is here for her Subsequent Wellness visit  Last Medicare Wellness visit information reviewed, patient interviewed and updates made to the record today  Health Risk Assessment:   Patient rates overall health as good  Patient feels that their physical health rating is same  Patient is satisfied with their life  Eyesight was rated as same  Hearing was rated as slightly worse  Patient feels that their emotional and mental health rating is same  Patients states they are never, rarely angry  Patient states they are often unusually tired/fatigued  Pain experienced in the last 7 days has been a lot  Patient's pain rating has been 8/10  Patient states that she has experienced no weight loss or gain in last 6 months  Depression Screening:   PHQ-9 Score: 6      Fall Risk Screening: In the past year, patient has experienced: no history of falling in past year      Urinary Incontinence Screening:   Patient has leaked urine accidently in the last six months  Leaked urine during UTI  Patient states \" I still do not feel that I Urinate properly, but I live with it  \"  Patient states that she does not wear Depends  PCP Dr Valentina Posey referred pt to [facility]  Pt saw Jacklyn Quevedo but did not find the provider agreeable due to lack of throughness  Pt agreeable to a new provider  Home Safety:  Patient has trouble with stairs inside or outside of their home  Patient has working smoke alarms and has working carbon monoxide detector  Home safety hazards include: none   " "    Nutrition:   Current diet is Regular  Patient states that she eats a \"Good, healthy diet\"    Medications:   Patient is currently taking over-the-counter supplements  OTC medications include: see medication list  Patient is able to manage medications  Activities of Daily Living (ADLs)/Instrumental Activities of Daily Living (IADLs):   Walk and transfer into and out of bed and chair?: Yes  Dress and groom yourself?: Yes    Bathe or shower yourself?: Yes    Feed yourself? Yes  Do your laundry/housekeeping?: Yes  Manage your money, pay your bills and track your expenses?: Yes  Make your own meals?: Yes    Do your own shopping?: Yes    Previous Hospitalizations:   Any hospitalizations or ED visits within the last 12 months?: No      Advance Care Planning:   Living will: Yes    Durable POA for healthcare: Yes    Advanced directive: Yes      Cognitive Screening:   Provider or family/friend/caregiver concerned regarding cognition?: No    PREVENTIVE SCREENINGS      Cardiovascular Screening:    General: Screening Not Indicated and History Lipid Disorder      Diabetes Screening:     General: Screening Not Indicated and History Diabetes      Colorectal Cancer Screening:     General: Screening Current      Breast Cancer Screening:     General: Screening Current      Cervical Cancer Screening:    General: Screening Not Indicated      Osteoporosis Screening:      Due for: DXA Axial      Abdominal Aortic Aneurysm (AAA) Screening:        General: Screening Not Indicated      Lung Cancer Screening:     General: Screening Not Indicated      Hepatitis C Screening:    General: Patient Declines    Screening, Brief Intervention, and Referral to Treatment (SBIRT)    Screening  Typical number of drinks in a day: 2  Typical number of drinks in a week: 14  Interpretation: Low risk drinking behavior      AUDIT-C Screenin) How often did you have a drink containing alcohol in the past year? 4 or more times a week  2) How many drinks " "did you have on a typical day when you were drinking in the past year? 1 to 2  3) How often did you have 6 or more drinks on one occasion in the past year? never    AUDIT-C Score: 4  Interpretation: Score 3-12 (female): POSITIVE screen for alcohol misuse    AUDIT Screenin) How often during the last year have you found that you were not able to stop drinking once you had started? 0 - never  5) How often during the last year have you failed to do what was normally expected from you because of drinking? 0 - never  6) How often during the last year have you needed a first drink in the morning to get yourself going after a heavy drinking session? 0 - never  7) How often during the last year have you had a feeling of guilt or remorse after drinking? 0 - never  8) How often during the last year have you been unable to remember what happened the night before because you had been drinking? 0 - never  9) Have you or someone else been injured as a result of your drinking? 0 - no  10) Has a relative or friend or a doctor or another health worker been concerned about your drinking or suggested you cut down? 0 - no    AUDIT Score: 4  Interpretation: Low risk alcohol consumption    Brief Intervention  Alcohol & drug use screenings were reviewed  No concerns regarding substance use disorder identified  Healthy alcohol use/limits discussed  No results found  Physical Exam:     /72 (BP Location: Left arm, Patient Position: Sitting, Cuff Size: Standard)   Pulse 69   Temp 98 °F (36 7 °C) (Tympanic)   Ht 5' 4\" (1 626 m)   Wt 70 8 kg (156 lb)   LMP  (LMP Unknown)   SpO2 96%   BMI 26 78 kg/m²     Physical Exam  Cardiovascular:      Pulses: no weak pulses          Dorsalis pedis pulses are 2+ on the right side  Posterior tibial pulses are 2+ on the right side  Feet:      Right foot:      Skin integrity: Dry skin present  No ulcer, skin breakdown, erythema, warmth or callus        Left foot:      Skin " integrity: Dry skin present  No ulcer, skin breakdown, erythema, warmth or callus            Christian Westbrook DO

## 2023-06-27 ENCOUNTER — TELEPHONE (OUTPATIENT)
Dept: FAMILY MEDICINE CLINIC | Facility: CLINIC | Age: 73
End: 2023-06-27

## 2023-06-27 ENCOUNTER — APPOINTMENT (OUTPATIENT)
Dept: RADIOLOGY | Facility: CLINIC | Age: 73
End: 2023-06-27
Payer: COMMERCIAL

## 2023-06-27 ENCOUNTER — OFFICE VISIT (OUTPATIENT)
Dept: URGENT CARE | Facility: CLINIC | Age: 73
End: 2023-06-27
Payer: COMMERCIAL

## 2023-06-27 VITALS
HEART RATE: 86 BPM | DIASTOLIC BLOOD PRESSURE: 82 MMHG | HEIGHT: 64 IN | WEIGHT: 150 LBS | SYSTOLIC BLOOD PRESSURE: 165 MMHG | RESPIRATION RATE: 16 BRPM | BODY MASS INDEX: 25.61 KG/M2 | OXYGEN SATURATION: 96 %

## 2023-06-27 DIAGNOSIS — M79.674 TOE PAIN, RIGHT: ICD-10-CM

## 2023-06-27 DIAGNOSIS — S90.111A CONTUSION OF RIGHT GREAT TOE WITHOUT DAMAGE TO NAIL, INITIAL ENCOUNTER: Primary | ICD-10-CM

## 2023-06-27 PROCEDURE — 99213 OFFICE O/P EST LOW 20 MIN: CPT

## 2023-06-27 PROCEDURE — 73630 X-RAY EXAM OF FOOT: CPT

## 2023-06-27 NOTE — TELEPHONE ENCOUNTER
Advice Only-    Patient dropped something on her foot x 1 week ago and is experiencing toe swelling and bruising  Patient is concerned that she has broken her toe or more  Patient thinks that she should see Urgent Care today for eval and XR, and is scheduled to see Dr Marivel Mcguire 6/28/2023 for multiple concerns, inclusive of the foot injury  Patient wants Dr Marivel Mcguire to advise -    See urgent care today first for XR or wait until tomorrow's OV?

## 2023-06-27 NOTE — TELEPHONE ENCOUNTER
I recommend she go to urgent care to be seen so that she can get xray done there at time of her evaluation

## 2023-06-27 NOTE — TELEPHONE ENCOUNTER
Appointment -    Received VM from the patient requesting an afternoon appointment with Dr Joe Sprague    Left detailed message informing patient of two available Same-Day slots available  Awaiting call back to schedule

## 2023-06-28 ENCOUNTER — OFFICE VISIT (OUTPATIENT)
Dept: FAMILY MEDICINE CLINIC | Facility: CLINIC | Age: 73
End: 2023-06-28
Payer: COMMERCIAL

## 2023-06-28 ENCOUNTER — VBI (OUTPATIENT)
Dept: ADMINISTRATIVE | Facility: OTHER | Age: 73
End: 2023-06-28

## 2023-06-28 VITALS
HEIGHT: 65 IN | DIASTOLIC BLOOD PRESSURE: 69 MMHG | WEIGHT: 151.6 LBS | SYSTOLIC BLOOD PRESSURE: 128 MMHG | BODY MASS INDEX: 25.26 KG/M2 | TEMPERATURE: 96.9 F | HEART RATE: 80 BPM | OXYGEN SATURATION: 97 %

## 2023-06-28 DIAGNOSIS — H91.93 BILATERAL HEARING LOSS, UNSPECIFIED HEARING LOSS TYPE: ICD-10-CM

## 2023-06-28 DIAGNOSIS — S90.111D CONTUSION OF RIGHT GREAT TOE WITHOUT DAMAGE TO NAIL, SUBSEQUENT ENCOUNTER: Primary | ICD-10-CM

## 2023-06-28 DIAGNOSIS — F33.9 RECURRENT MAJOR DEPRESSIVE DISORDER, REMISSION STATUS UNSPECIFIED (HCC): ICD-10-CM

## 2023-06-28 DIAGNOSIS — M54.16 LUMBAR BACK PAIN WITH RADICULOPATHY AFFECTING RIGHT LOWER EXTREMITY: ICD-10-CM

## 2023-06-28 DIAGNOSIS — F10.10 ALCOHOL ABUSE: ICD-10-CM

## 2023-06-28 DIAGNOSIS — E11.9 TYPE 2 DIABETES MELLITUS WITHOUT COMPLICATION, WITHOUT LONG-TERM CURRENT USE OF INSULIN (HCC): ICD-10-CM

## 2023-06-28 LAB — SL AMB POCT HEMOGLOBIN AIC: 6.6 (ref ?–6.5)

## 2023-06-28 PROCEDURE — 83036 HEMOGLOBIN GLYCOSYLATED A1C: CPT | Performed by: FAMILY MEDICINE

## 2023-06-28 PROCEDURE — 99214 OFFICE O/P EST MOD 30 MIN: CPT | Performed by: FAMILY MEDICINE

## 2023-06-28 RX ORDER — VENLAFAXINE HYDROCHLORIDE 150 MG/1
150 CAPSULE, EXTENDED RELEASE ORAL EVERY OTHER DAY
Qty: 45 CAPSULE | Refills: 1 | Status: SHIPPED | OUTPATIENT
Start: 2023-06-28

## 2023-06-28 RX ORDER — VENLAFAXINE HYDROCHLORIDE 75 MG/1
75 CAPSULE, EXTENDED RELEASE ORAL EVERY OTHER DAY
Qty: 45 CAPSULE | Refills: 1 | Status: SHIPPED | OUTPATIENT
Start: 2023-06-28

## 2023-06-28 NOTE — PROGRESS NOTES
Name: Carlos Bolaños      : 1950      MRN: [id number]  Encounter Provider: Marie Tobar DO  Encounter Date: 2023   Encounter department: [facility]     1  Contusion of right great toe without damage to nail, subsequent encounter  -     Ambulatory Referral to Podiatry; Future  Reviewed visit to urgent care as well as xray which showed no fracture  She might benefit from flat bottom shoe  Since pain is worsening, will put referral in for podiatrist--Dr Oscar Garduno  2  Type 2 diabetes mellitus without complication, without long-term current use of insulin ([facility])  -     POCT hemoglobin A1c  -     Ambulatory Referral to Physical Therapy; Future  A1c in office today was 6 6%  3  Recurrent major depressive disorder, remission status unspecified ([facility])  -     venlafaxine (EFFEXOR-XR) 150 mg 24 hr capsule; Take 1 capsule (150 mg total) by mouth every other day  -     venlafaxine (EFFEXOR-XR) 75 mg 24 hr capsule; Take 1 capsule (75 mg total) by mouth every other day  rx for her effexor refilled  She does not feel that dose needs to be refilled but does want to see behavioral health therapist    Referral placed  4  Bilateral hearing loss, unspecified hearing loss type  -     Ambulatory Referral to Audiology; Future  Refer audiology per patient request  5  Lumbar back pain with radiculopathy affecting right lower extremity  -     Ambulatory Referral to [address] [place] [address] Therapists; Future  Refer [facility]  6  Alcohol abuse  Previously had 2 glasses of wine per night but with increased family stress, now drinking up to a bottle per day  She doesn't feel that she needs assistance with her alcohol cessation  F/u in 4-6 weeks            Subjective     HPI    Patient is a 67year old female with hypertension, hyperlipidemia, DM2, hypothyroidism, GERD, NAFLD, depression, sarcoidosis, vitamin d deficiency, osteopenia and atherosclerosis who is being seen today for a few concerns  She was recently seen here on 23 for her AMWV and for f/u on all of her chronic problems  Labs ordered for f/u on her diabetes, hypertension, hyperlipidemia and hypothyroidism but have not been completed as ordered  Orders reprinted today  She reported having some increased stress at time of that visit in may due to loss of some friends and issues with settling an estate  She declined change in medication at that time  Today, is requesting referral for counselor  1  Injured right big toe (dropped a large potted plant on her toe while moving it) 2 weeks ago  [place] the [place]  She was seen in urgent care yesterday  Xray done and showed no fracture  2  Requests referral to audiology to have hearing evaluated  She states that her hearing is decreased in both ears x years, worsening  No tinnitus  3  Wants referral to counselor  Continues to struggle with her anxiety and depression  Family issues  States that she has been drinking more  Instead of two glasses of wine might drink a bottle  She does not think she needs help with the alcohol use  Taking effexor xr 150 alternating with 75 mg  Does not want to increase dose  PHQ-2/9 Depression Screening    Little interest or pleasure in doing things: 1 - several days  Feeling down, depressed, or hopeless: 1 - several days  Trouble falling or staying asleep, or sleeping too much: 1 - several days  Feeling tired or having little energy: 1 - several days  Poor appetite or overeatin - several days  Feeling bad about yourself - or that you are a failure or have let yourself or your family down: 1 - several days  Trouble concentrating on things, such as reading the newspaper or watching television: 1 - several days  Moving or speaking so slowly that other people could have noticed   Or the opposite - being so fidgety or restless that you have been moving around a lot more than usual: 0 - not at all  Thoughts that you would be better off dead, or of hurting yourself in some way: 0 - not at all  PHQ-9 Score: 7   PHQ-9 Interpretation: Mild depression        ISAC-7 Flowsheet Screening    Flowsheet Row Most Recent Value   Over the last 2 weeks, how often have you been bothered by any of the following problems? Feeling nervous, anxious, or on edge 3   Not being able to stop or control worrying 1   Worrying too much about different things 1   Trouble relaxing 3   Being so restless that it is hard to sit still 0   Becoming easily annoyed or irritable 0   Feeling afraid as if something awful might happen 0   ISAC-7 Total Score 8          4  Requests referral to [facility] for for her lumbar radiculopathy  Review of Systems    Past Medical History:   Diagnosis Date   • Anxiety 03/29/2021   • Cataract 03/29/2021   • cough variant asthma    • COVID-19 03/06/2021    had again 3/29/22 (+antigen test)   • Diabetes mellitus ([address] )    • Disease of thyroid gland    • Diverticulosis     colonoscopy 7/31/20   • Fatty liver disease, nonalcoholic    • GERD (gastroesophageal reflux disease)     Last EGD 2016   • 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; treated with voriconazole 200 bid x 12 weeks   • Hx of migraine headaches     pt does not have   • Hyperlipidemia    • Hypertension    • Hypogammaglobulinemia ([address] )    • Pulmonary embolism (H[facility])    • Reactive airway disease    • Restless leg syndrome    • Sepsis ([address] )    • Streptococcal pneumonia ([facility] [address] [address] )    • Tubular adenoma 07/31/2020    Dr Yulia Huynh   recall 5 years     Past Surgical History:   Procedure Laterality Date   • BREAST BIOPSY Right     benign   • CARDIAC CATHETERIZATION     • CATARACT EXTRACTION, BILATERAL     • COLONOSCOPY     • CORNEA LACERATION REPAIR Right    • REFRACTIVE SURGERY     • TONSILLECTOMY     • UPPER GASTROINTESTINAL ENDOSCOPY     • WISDOM TOOTH EXTRACTION       Family History   Problem Relation Age of Onset   • Heart disease Mother    • Heart disease Father    • Stroke Sister    • Heart disease Sister    • Lung cancer Sister 72   • No Known Problems Daughter    • No Known Problems Maternal Grandmother    • No Known Problems Maternal Grandfather    • No Known Problems Paternal Grandmother    • No Known Problems Paternal Grandfather    • Heart disease Brother    • No Known Problems Maternal Aunt    • No Known Problems Maternal Aunt    • No Known Problems Maternal Aunt    • No Known Problems Maternal Aunt    • No Known Problems Paternal Aunt    • No Known Problems Paternal Aunt    • Colon polyps Neg Hx    • Colon cancer Neg Hx      Social History     Socioeconomic History   • Marital status:      Spouse name: None   • Number of children: None   • Years of education: None   • Highest education level: None   Occupational History   • None   Tobacco Use   • Smoking status: Former     Packs/day: 0 25     Years: 25 00     Total pack years: 6 25     Types: Cigarettes     Start date:      Quit date:      Years since quittin 5   • Smokeless tobacco: Never   Vaping Use   • Vaping Use: Never used   Substance and Sexual Activity   • Alcohol use: Yes     Alcohol/week: 14 0 standard drinks of alcohol     Types: 14 Glasses of wine per week   • Drug use: Not Currently   • Sexual activity: Not Currently   Other Topics Concern   • None   Social History Narrative        Lives in in-law suite with son and his family    Used to work for [employer] as a     Likes to Delroy and fili     Social Determinants of Health     Financial Resource Strain: Low Risk  (2023)    Overall Financial Resource Strain (CARDIA)    • Difficulty of Paying Living Expenses: Not hard at all   Food Insecurity: Not on file   Transportation Needs: No Transportation Needs (2023)    PRAPARE - Transportation    • Lack of Transportation (Medical): No    • Lack of Transportation (Non-Medical):  No Physical Activity: Not on file   Stress: Not on file   Social Connections: Not on file   Intimate Partner Violence: Not on file   Housing Stability: Not on file     Current Outpatient Medications on File Prior to Visit   Medication Sig   • albuterol (PROVENTIL HFA,VENTOLIN HFA) 90 mcg/act inhaler Inhale 2 puffs 4 (four) times a day   • amLODIPine (NORVASC) 5 mg tablet Take 5 mg by mouth daily     • ascorbic acid (VITAMIN C) 500 mg tablet Take 500 mg by mouth daily   • Biotin 5000 MCG CAPS Take 5,000 mcg by mouth in the morning   • calcium carbonate (OS-BRITTA) 600 MG tablet Take 600 mg by mouth 2 (two) times a day with meals   • calcium polycarbophil (FIBERCON) 625 mg tablet Take 1 tablet (625 mg total) by mouth daily   • cholecalciferol (VITAMIN D3) 1,000 units tablet Take 1,000 Units by mouth daily   • dexlansoprazole (DEXILANT) 60 MG capsule Take 1 capsule (60 mg total) by mouth daily   • GUAIFENESIN 1200 PO Take 1,500 mg by mouth 2 (two) times a day   • hydrochlorothiazide (HYDRODIURIL) 25 mg tablet TAKE 1 TABLET DAILY   • ipratropium-albuterol, FOR EMS ONLY, (DUO-NEB) 0 5-2 5 mg/3 mL nebulizer solution Use 3 mL as needed   • losartan (COZAAR) 100 MG tablet TAKE 1 TABLET DAILY   • metFORMIN (GLUCOPHAGE-XR) 500 mg 24 hr tablet Take 1,000 mg by mouth daily   • nitroglycerin (NITROSTAT) 0 4 mg SL tablet    • OZEMPIC, 0 25 OR 0 5 MG/DOSE, 2 MG/1 5ML SOPN    • potassium chloride (K-DUR,KLOR-CON) 20 mEq tablet Take 20 mEq by mouth daily     • Qvar RediHaler 40 MCG/ACT inhaler Inhale 2 puffs 2 (two) times a day   • rosuvastatin (CRESTOR) 20 MG tablet Take 20 mg by mouth daily     • SYNTHROID 75 MCG tablet Take 75 mcg by mouth daily in the early morning     • [DISCONTINUED] venlafaxine (EFFEXOR-XR) 150 mg 24 hr capsule Take 150 mg by mouth every other day   • [DISCONTINUED] venlafaxine (EFFEXOR-XR) 75 mg 24 hr capsule Take 75 mg by mouth every other day   • budesonide (PULMICORT) 0 5 mg/2 mL nebulizer solution Take 0 5 "mg by nebulization as needed in the morning  Rinse mouth after use      • [DISCONTINUED] benzonatate (TESSALON PERLES) 100 mg capsule Take 100 mg by mouth 3 (three) times a day as needed for cough (Patient not taking: Reported on 6/28/2023)     Allergies   Allergen Reactions   • Epinephrine Shortness Of Breath and Palpitations   • Carisoprodol Other (See Comments)     Other reaction(s): SOMA (11/04/2010)     • Irbesartan-Hydrochlorothiazide Other (See Comments)     Possible lichen planus   • Lisinopril      Lichen planus   • Other Other (See Comments)     Other reaction(s): Muscle Relaxers (09/03/2014)     • Tizanidine      Altered mental status     Immunization History   Administered Date(s) Administered   • Hep A, adult 03/02/2005   • INFLUENZA 02/09/2010, 10/14/2011, 09/28/2016, 10/11/2017   • Influenza Split High Dose Preservative Free IM 11/20/2019   • Influenza, high dose seasonal 0 7 mL 11/17/2022   • Pneumococcal Conjugate Vaccine 20-valent (Pcv20), Polysace 05/17/2022   • Pneumococcal Polysaccharide PPV23 01/10/2006, 01/01/2014   • Td (adult), adsorbed 03/08/2002, 10/13/2003   • Tdap 10/06/2010       Objective     /69 (BP Location: Left arm, Patient Position: Sitting, Cuff Size: Standard)   Pulse 80   Temp (!) 96 9 °F (36 1 °C) (Tympanic)   Ht 5' 4 5\" (1 638 m)   Wt 68 8 kg (151 lb 9 6 oz)   LMP  (LMP Unknown)   SpO2 97%   BMI 25 62 kg/m²     Physical Exam  Vitals and nursing note reviewed  Constitutional:       General: She is not in acute distress  Appearance: Normal appearance  She is not ill-appearing, toxic-appearing or diaphoretic  HENT:      Head: Normocephalic and atraumatic  Right Ear: Tympanic membrane normal       Left Ear: Tympanic membrane normal       Mouth/Throat:      Mouth: Mucous membranes are moist       Pharynx: No oropharyngeal exudate or posterior oropharyngeal erythema     Eyes:      Conjunctiva/sclera: Conjunctivae normal    Cardiovascular:      Rate and " Rhythm: Normal rate and regular rhythm  Heart sounds: No murmur heard  Pulmonary:      Effort: Pulmonary effort is normal       Breath sounds: Normal breath sounds  Musculoskeletal:      Cervical back: Normal range of motion and neck supple  Right lower leg: No edema  Left lower leg: No edema  Comments: Right foot with swelling and ecchymosis of 1st digit  Tenderness IP joint  No tenderness MTP joint  Lymphadenopathy:      Cervical: No cervical adenopathy  Neurological:      General: No focal deficit present  Mental Status: She is alert and oriented to person, place, and time  Deep Tendon Reflexes: Abnormal reflex: tearful throughout visit         Wilfred Broderick DO

## 2023-06-28 NOTE — TELEPHONE ENCOUNTER
06/28/23 10:36 AM    Patient contacted (left message) to bring ACP document to next scheduled pcp visit  Thank you    Ryan Resendez [facility]

## 2023-06-30 ENCOUNTER — TELEPHONE (OUTPATIENT)
Dept: PSYCHIATRY | Facility: CLINIC | Age: 73
End: 2023-06-30

## 2023-06-30 DIAGNOSIS — I10 ESSENTIAL HYPERTENSION: ICD-10-CM

## 2023-06-30 NOTE — TELEPHONE ENCOUNTER
Contacted patient in regards to Routine Referral in attempts to verify patient's needs of services and add patient to proper wait list. LVM for patient to contact intake dept  in regards to referral

## 2023-07-12 NOTE — TELEPHONE ENCOUNTER
Was calling pt in regards to routine referral and adding to proper wait list. LVM for pt to contact intake dept. Third attempt to reach pt. Referral closed.

## 2023-07-12 NOTE — TELEPHONE ENCOUNTER
Patient has been added to the [facility] wait list with a referral.    Insurance: [employer] Type:    Commercial []   Medicaid []   [place] (if applicable)   Medicare [x]  Location Preference: Therapy Anywhere  Provider Preference: None  Virtual: Yes [x] No []

## 2023-07-13 LAB
ALBUMIN SERPL-MCNC: 4.4 G/DL (ref 3.8–4.8)
ALBUMIN/CREAT UR: 23 MG/G CREAT (ref 0–29)
ALBUMIN/GLOB SERPL: 2.1 {RATIO} (ref 1.2–2.2)
ALP SERPL-CCNC: 136 IU/L (ref 44–121)
ALT SERPL-CCNC: 50 IU/L (ref 0–32)
AST SERPL-CCNC: 58 IU/L (ref 0–40)
BASOPHILS # BLD AUTO: 0 X10E3/UL (ref 0–0.2)
BASOPHILS NFR BLD AUTO: 1 %
BILIRUB SERPL-MCNC: 0.4 MG/DL (ref 0–1.2)
BUN SERPL-MCNC: 9 MG/DL (ref 8–27)
BUN/CREAT SERPL: 15 (ref 12–28)
CALCIUM SERPL-MCNC: 9.4 MG/DL (ref 8.7–10.3)
CHLORIDE SERPL-SCNC: 98 MMOL/L (ref 96–106)
CHOLEST SERPL-MCNC: 185 MG/DL (ref 100–199)
CO2 SERPL-SCNC: 27 MMOL/L (ref 20–29)
CREAT SERPL-MCNC: 0.62 MG/DL (ref 0.57–1)
CREAT UR-MCNC: 201.1 MG/DL
EGFR: 94 ML/MIN/1.73
EOSINOPHIL # BLD AUTO: 0.2 X10E3/UL (ref 0–0.4)
EOSINOPHIL NFR BLD AUTO: 3 %
ERYTHROCYTE [DISTWIDTH] IN BLOOD BY AUTOMATED COUNT: 12 % (ref 11.7–15.4)
EST. AVERAGE GLUCOSE BLD GHB EST-MCNC: 151 MG/DL
GLOBULIN SER-MCNC: 2.1 G/DL (ref 1.5–4.5)
GLUCOSE SERPL-MCNC: 156 MG/DL (ref 70–99)
HBA1C MFR BLD: 6.9 % (ref 4.8–5.6)
HCT VFR BLD AUTO: 42.6 % (ref 34–46.6)
HDLC SERPL-MCNC: 59 MG/DL
HGB BLD-MCNC: 14.6 G/DL (ref 11.1–15.9)
IMM GRANULOCYTES # BLD: 0 X10E3/UL (ref 0–0.1)
IMM GRANULOCYTES NFR BLD: 1 %
LDLC SERPL CALC-MCNC: 107 MG/DL (ref 0–99)
LDLC/HDLC SERPL: 1.8 RATIO (ref 0–3.2)
LYMPHOCYTES # BLD AUTO: 2.2 X10E3/UL (ref 0.7–3.1)
LYMPHOCYTES NFR BLD AUTO: 34 %
MCH RBC QN AUTO: 34 PG (ref 26.6–33)
MCHC RBC AUTO-ENTMCNC: 34.3 G/DL (ref 31.5–35.7)
MCV RBC AUTO: 99 FL (ref 79–97)
MICROALBUMIN UR-MCNC: 45.7 UG/ML
MONOCYTES # BLD AUTO: 0.5 X10E3/UL (ref 0.1–0.9)
MONOCYTES NFR BLD AUTO: 8 %
NEUTROPHILS # BLD AUTO: 3.4 X10E3/UL (ref 1.4–7)
NEUTROPHILS NFR BLD AUTO: 53 %
PLATELET # BLD AUTO: 295 X10E3/UL (ref 150–450)
POTASSIUM SERPL-SCNC: 3.7 MMOL/L (ref 3.5–5.2)
PROT SERPL-MCNC: 6.5 G/DL (ref 6–8.5)
RBC # BLD AUTO: 4.29 X10E6/UL (ref 3.77–5.28)
SL AMB VLDL CHOLESTEROL CALC: 19 MG/DL (ref 5–40)
SODIUM SERPL-SCNC: 140 MMOL/L (ref 134–144)
TRIGL SERPL-MCNC: 106 MG/DL (ref 0–149)
TSH SERPL DL<=0.005 MIU/L-ACNC: 0.46 UIU/ML (ref 0.45–4.5)
WBC # BLD AUTO: 6.3 X10E3/UL (ref 3.4–10.8)

## 2023-07-14 ENCOUNTER — OFFICE VISIT (OUTPATIENT)
Dept: PODIATRY | Facility: CLINIC | Age: 73
End: 2023-07-14
Payer: COMMERCIAL

## 2023-07-14 VITALS
HEART RATE: 76 BPM | WEIGHT: 151 LBS | BODY MASS INDEX: 25.16 KG/M2 | SYSTOLIC BLOOD PRESSURE: 143 MMHG | HEIGHT: 65 IN | DIASTOLIC BLOOD PRESSURE: 80 MMHG

## 2023-07-14 DIAGNOSIS — E11.9 TYPE 2 DIABETES MELLITUS WITHOUT COMPLICATION, WITHOUT LONG-TERM CURRENT USE OF INSULIN (HCC): ICD-10-CM

## 2023-07-14 DIAGNOSIS — S92.414A CLOSED NONDISPLACED FRACTURE OF PROXIMAL PHALANX OF RIGHT GREAT TOE, INITIAL ENCOUNTER: Primary | ICD-10-CM

## 2023-07-14 DIAGNOSIS — S90.111D CONTUSION OF RIGHT GREAT TOE WITHOUT DAMAGE TO NAIL, SUBSEQUENT ENCOUNTER: ICD-10-CM

## 2023-07-14 PROCEDURE — 28490 TREAT BIG TOE FRACTURE: CPT | Performed by: PODIATRIST

## 2023-07-14 NOTE — PROGRESS NOTES
Assessment/Plan:  X-rays from 6/27/2023 personally reviewed, fracture of first proximal phalanx head noted primarily on oblique and on lateral views. Lateral view shows clear cortical break on the plantar condylar surface which then courses dorsally. Clinically good alignment. Dispensed postop rigid shoe to limit flexing at the MPJ. Patient instructed on rest, ice, and elevation to help with her symptoms. Limit walking to a minimum for the first 3 weeks, then then gradual return to athletic shoe as tolerated. Follow-up in 6 weeks. No problem-specific Assessment & Plan notes found for this encounter. Diagnoses and all orders for this visit:    Closed nondisplaced fracture of proximal phalanx of right great toe, initial encounter  -     Orthopedic injury treatment  -     Post Op Shoe    Contusion of right great toe without damage to nail, subsequent encounter  -     Ambulatory Referral to Podiatry    Type 2 diabetes mellitus without complication, without long-term current use of insulin [facility])          Orthopedic injury treatment    Date/Time: 7/14/2023 2:45 PM    Performed by: Surekha Lubin DPM  Authorized by: Surekha Lubin DPM    Patient Location:  Other (comment)  Universal Protocol:  Consent: Verbal consent obtained. Risks and benefits: risks, benefits and alternatives were discussed  Consent given by: patient  Time out: Immediately prior to procedure a "time out" was called to verify the correct patient, procedure, equipment, support staff and site/side marked as required. Patient understanding: patient states understanding of the procedure being performed  Patient identity confirmed: verbally with patient      Injury location:  Toe  Location details:  Right great toe  Injury type:  Fracture  Fracture type: proximal phalanx    Neurovascular status: Neurovascularly intact    Manipulation performed?: No    Immobilization: Rigid postop shoe.   Splint type: Schuyler splint first and second toes.  Neurovascular status: Neurovascularly intact    Patient tolerance:  Patient tolerated the procedure well with no immediate complications        Subjective:      Patient ID: Alley Lomas is a 67 y.o. female. 7/14/2023: 76-year-old female injured right great toe approximately 3-4 weeks ago while moving a heavy planter. Reports slipping and kicking the plantar resulting in pain and swelling of her first second and third toes. Patient went to care now and had x-rays which were determined to be negative for fracture. Patient referred here by primary care for continued care of injury to right foot. Patient reports her big toe is still quite swollen and painful. The following portions of the patient's history were reviewed and updated as appropriate: allergies, current medications, past family history, past medical history, past social history, past surgical history and problem list.    Review of Systems   Constitutional: Negative. HENT: Negative. Eyes: Negative. Respiratory: Negative. Cardiovascular: Negative. Gastrointestinal: Negative. Endocrine: Negative. Genitourinary: Negative. Musculoskeletal: Positive for joint swelling. Painful right great toe with swelling secondary to injury   Skin: Negative. Allergic/Immunologic: Negative. Neurological: Negative. Hematological: Negative. Psychiatric/Behavioral: Negative. Objective:    /80   Pulse 76   Ht 5' 4.5" (1.638 m)   Wt 68.5 kg (151 lb)   LMP  (LMP Unknown)   BMI 25.52 kg/m²      Physical Exam  Constitutional:       Appearance: Normal appearance. HENT:      Head: Normocephalic. Right Ear: External ear normal.      Left Ear: External ear normal.   Eyes:      Pupils: Pupils are equal, round, and reactive to light. Cardiovascular:      Rate and Rhythm: Normal rate. Pulses:           Dorsalis pedis pulses are 1+ on the right side and 1+ on the left side.         Posterior tibial pulses are 1+ on the right side and 1+ on the left side. Pulmonary:      Effort: Pulmonary effort is normal.   Musculoskeletal:         General: Swelling, tenderness and signs of injury present. Cervical back: Normal range of motion. Right foot: Decreased range of motion. Comments: Pain with palpation/range of motion right first IPJ. Feet:      Right foot:      Protective Sensation: 10 sites tested. 10 sites sensed. Skin integrity: Skin integrity normal.      Left foot:      Protective Sensation: 10 sites tested. 10 sites sensed. Skin integrity: Skin integrity normal.   Skin:     General: Skin is warm and dry. Capillary Refill: Capillary refill takes 2 to 3 seconds. Neurological:      General: No focal deficit present. Mental Status: She is alert.    Psychiatric:         Mood and Affect: Mood normal.

## 2023-07-17 PROBLEM — S92.414A CLOSED NONDISPLACED FRACTURE OF PROXIMAL PHALANX OF RIGHT GREAT TOE: Status: ACTIVE | Noted: 2023-07-17

## 2023-07-28 ENCOUNTER — TELEPHONE (OUTPATIENT)
Dept: ADMINISTRATIVE | Facility: OTHER | Age: 73
End: 2023-07-28

## 2023-07-28 NOTE — TELEPHONE ENCOUNTER
07/28/23 3:57 PM    Patient contacted (left message) to bring ACP document to next scheduled pcp visit. Thank you.   Renetta Napier MA  [facility]

## 2023-07-31 NOTE — PROGRESS NOTES
Name: Greg Muhammad      : 1950      MRN: [id number]  Encounter Provider: Suzan Monroe DO  Encounter Date: 2023   Encounter department: [address] [place] [address]     1. Closed nondisplaced fracture of distal phalanx of right great toe with routine healing, subsequent encounter  Patient saw podiatry and is wearing post op shoe. Still with some pain. Has f/u with podiatry next week. 2. Alcohol abuse  Doing better. States that she has cut down to only one drink at dinner. No longer drinking a bottle in one sitting. 3. Recurrent major depressive disorder, remission status unspecified ([facility])  She is taking effexor xr 150 and 75 for total of 225 mg  Still with some depressive sx. Declines referral to psychiatry for med management  She is on wait list for counseling at [facility]. Agrees to call if sx worsen. 4. Essential hypertension  -     losartan (COZAAR) 100 MG tablet; Take 1 tablet (100 mg total) by mouth daily  -     hydrochlorothiazide (HYDRODIURIL) 25 mg tablet; Take 1 tablet (25 mg total) by mouth daily  -     amLODIPine (NORVASC) 10 mg tablet; Take 1 tablet (10 mg total) by mouth daily  bp not at goal today. Was also elevated at recent visit to podiatry. Will increase her amlodipine from 5 to 10 mg once daily. 5. Hyperlipidemia, unspecified hyperlipidemia type  -     rosuvastatin (CRESTOR) 20 MG tablet; Take 1 tablet (20 mg total) by mouth daily  -     Lipid Panel with Direct LDL reflex; Future; Expected date: 2024  -     Lipid Panel with Direct LDL reflex  Lab Results   Component Value Date    CHOLESTEROL 185 2023    CHOLESTEROL 141 2022    CHOLESTEROL 149 2022     Lab Results   Component Value Date    HDL 59 2023    HDL 50 2022    HDL 51 2022     Lab Results   Component Value Date    TRIG 106 2023    TRIG 100 2022    TRIG 90 2022     No results found for: "[address]"  Reviewed her labs. She reports that she had not been taking her crestor daily prior to this labwork because she read that they are not safe  Given her atherosclerosis on ct scan, recommend she take the crestor every day for LDL goal < 70  6. Type 2 diabetes mellitus without complication, without long-term current use of insulin (HCC)  -     metFORMIN (GLUCOPHAGE-XR) 500 mg 24 hr tablet; Take 2 tablets (1,000 mg total) by mouth daily  -     Ozempic, 0.25 or 0.5 MG/DOSE, 2 MG/1.5ML injection pen; Inject 0.38 mL (0.5 mg total) under the skin every 7 days  -     Albumin / creatinine urine ratio; Future; Expected date: 01/12/2024  -     Comprehensive metabolic panel; Future; Expected date: 01/12/2024  -     Hemoglobin A1C; Future; Expected date: 01/12/2024  -     Albumin / creatinine urine ratio  -     Comprehensive metabolic panel  -     Hemoglobin A1C  Lab Results   Component Value Date    HGBA1C 6.9 (H) 07/12/2023     Refilled her metformin and ozempic. Encouraged her to watch diet more closely/   No change in meds today  Check labs in November followed by appt  7. Hypothyroidism, unspecified type  -     TSH, 3rd generation with Free T4 reflex; Future; Expected date: 01/12/2024  -     TSH, 3rd generation with Free T4 reflex           Subjective     HPI   Patient is a 67year old female with hypertension, hyperlipidemia, DM2, hypothyroidism, GERD, NAFLD, depression, sarcoidosis, vitamin d deficiency, osteopenia and atherosclerosis who is being seen today for f/u visit. She was seen on 6/28/23 for right big toe pain after dropping a potted plant on it.  she was referred to podiatry. Saw podiatry on 7/14 and podiatry reviewed her xrays. Felt that she had  fracture of first proximal phalanx head noted primarily on oblique and on lateral views. Lateral view shows clear cortical break on the plantar condylar surface which then courses dorsally. Clinically good alignment. Was put in post-op shoe.  Some pain yet when she tries to bend or move toe. She has f/u next week. She had also requested referral to audiology for her hearing loss and a referral to counseling to help with her anxiety and depression as she noted that she had been drinking more to cope with her stress. She was contacted and added to the therapy wait list on 23. Her phq 9 score was 7 and ISAC 7 score was 8 at time of last visit. She declined change in her medication (effexor). She states that she is "dealing with it" okay. ISAC-7 Flowsheet Screening    Flowsheet Row Most Recent Value   Over the last 2 weeks, how often have you been bothered by any of the following problems? Feeling nervous, anxious, or on edge 0   Not being able to stop or control worrying 0   Worrying too much about different things 0   Trouble relaxing 0   Being so restless that it is hard to sit still 0   Becoming easily annoyed or irritable 0   Feeling afraid as if something awful might happen 0   ISAC-7 Total Score 0        PHQ-2/9 Depression Screening    Little interest or pleasure in doing things: 1 - several days  Feeling down, depressed, or hopeless: 1 - several days  Trouble falling or staying asleep, or sleeping too much: 1 - several days  Feeling tired or having little energy: 1 - several days  Poor appetite or overeatin - not at all  Feeling bad about yourself - or that you are a failure or have let yourself or your family down: 0 - not at all  Trouble concentrating on things, such as reading the newspaper or watching television: 0 - not at all  Moving or speaking so slowly that other people could have noticed. Or the opposite - being so fidgety or restless that you have been moving around a lot more than usual: 0 - not at all  Thoughts that you would be better off dead, or of hurting yourself in some way: 0 - not at all  PHQ-9 Score: 4   PHQ-9 Interpretation: No or Minimal depression            She had also requested referral to [address] [place] [address] rehab for her lumbar radiculopathy.  She put this on hold because of her foot injury.             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 Patt Laguna.  recall 5 years     Past Surgical History:   Procedure Laterality Date   • BREAST BIOPSY Right     benign   • CARDIAC CATHETERIZATION     • CATARACT EXTRACTION, BILATERAL     • COLONOSCOPY     • CORNEA LACERATION REPAIR Right    • REFRACTIVE SURGERY     • TONSILLECTOMY     • UPPER GASTROINTESTINAL ENDOSCOPY     • WISDOM TOOTH EXTRACTION       Family History   Problem Relation Age of Onset   • Heart disease Mother    • Heart disease Father    • Stroke Sister    • Heart disease Sister    • Lung cancer Sister 72   • No Known Problems Daughter    • No Known Problems Maternal Grandmother    • No Known Problems Maternal Grandfather    • No Known Problems Paternal Grandmother    • No Known Problems Paternal Grandfather    • Heart disease Brother    • No Known Problems Maternal Aunt    • No Known Problems Maternal Aunt    • No Known Problems Maternal Aunt    • No Known Problems Maternal Aunt    • No Known Problems Paternal Aunt    • No Known Problems Paternal Aunt    • Colon polyps Neg Hx    • Colon cancer Neg Hx      Social History     Socioeconomic History   • Marital status:      Spouse name: None   • Number of children: None   • Years of education: None   • Highest education level: None   Occupational History   • None   Tobacco Use   • Smoking status: Former     Packs/day: 0.25     Years: 25.00     Total pack years: 6.25     Types: Cigarettes     Start date: 64     Quit date:      Years since quittin.6   • Smokeless tobacco: Never   Vaping Use   • Vaping Use: Never used   Substance and Sexual Activity   • Alcohol use: Yes     Alcohol/week: 14.0 standard drinks of alcohol     Types: 14 Glasses of wine per week   • Drug use: Not Currently   • Sexual activity: Not Currently   Other Topics Concern   • None   Social History Narrative        Lives in in-law suite with son and his family    Used to work for [employer] as a     Likes to Delroy and fili     Social Determinants of Health     Financial Resource Strain: Low Risk  (2023)    Overall Financial Resource Strain (CARDIA)    • Difficulty of Paying Living Expenses: Not hard at all   Food Insecurity: Not on file   Transportation Needs: No Transportation Needs (2023)    PRAPARE - Transportation    • Lack of Transportation (Medical): No    • Lack of Transportation (Non-Medical): No   Physical Activity: Not on file   Stress: Not on file   Social Connections: Not on file   Intimate Partner Violence: Not on file   Housing Stability: Not on file     Current Outpatient Medications on File Prior to Visit   Medication Sig   • albuterol (PROVENTIL HFA,VENTOLIN HFA) 90 mcg/act inhaler Inhale 2 puffs 4 (four) times a day   • ascorbic acid (VITAMIN C) 500 mg tablet Take 500 mg by mouth daily   • Biotin 5000 MCG CAPS Take 5,000 mcg by mouth in the morning   • budesonide (PULMICORT) 0.5 mg/2 mL nebulizer solution Take 0.5 mg by nebulization as needed in the morning. Rinse mouth after use.  .   • calcium carbonate (OS-BRITTA) 600 MG tablet Take 600 mg by mouth 2 (two) times a day with meals • calcium polycarbophil (FIBERCON) 625 mg tablet Take 1 tablet (625 mg total) by mouth daily   • cholecalciferol (VITAMIN D3) 1,000 units tablet Take 1,000 Units by mouth daily   • dexlansoprazole (DEXILANT) 60 MG capsule Take 1 capsule (60 mg total) by mouth daily   • GUAIFENESIN 1200 PO Take 1,500 mg by mouth 2 (two) times a day   • potassium chloride (K-DUR,KLOR-CON) 20 mEq tablet Take 20 mEq by mouth daily     • SYNTHROID 75 MCG tablet Take 75 mcg by mouth daily in the early morning     • venlafaxine (EFFEXOR-XR) 150 mg 24 hr capsule Take 1 capsule (150 mg total) by mouth every other day   • venlafaxine (EFFEXOR-XR) 75 mg 24 hr capsule Take 1 capsule (75 mg total) by mouth every other day   • [DISCONTINUED] amLODIPine (NORVASC) 5 mg tablet Take 5 mg by mouth daily     • [DISCONTINUED] hydrochlorothiazide (HYDRODIURIL) 25 mg tablet TAKE 1 TABLET DAILY   • [DISCONTINUED] losartan (COZAAR) 100 MG tablet TAKE 1 TABLET DAILY   • [DISCONTINUED] metFORMIN (GLUCOPHAGE-XR) 500 mg 24 hr tablet Take 1,000 mg by mouth daily   • [DISCONTINUED] OZEMPIC, 0.25 OR 0.5 MG/DOSE, 2 MG/1.5ML SOPN    • [DISCONTINUED] rosuvastatin (CRESTOR) 20 MG tablet Take 20 mg by mouth daily     • ipratropium-albuterol, FOR EMS ONLY, (DUO-NEB) 0.5-2.5 mg/3 mL nebulizer solution Use 3 mL as needed (Patient not taking: Reported on 8/1/2023)   • nitroglycerin (NITROSTAT) 0.4 mg SL tablet  (Patient not taking: Reported on 8/1/2023)   • Qvar RediHaler 40 MCG/ACT inhaler Inhale 2 puffs 2 (two) times a day (Patient not taking: Reported on 8/1/2023)     Allergies   Allergen Reactions   • Epinephrine Shortness Of Breath and Palpitations   • Carisoprodol Other (See Comments)     Other reaction(s): SOMA (11/04/2010)     • Irbesartan-Hydrochlorothiazide Other (See Comments)     Possible lichen planus   • Lisinopril      Lichen planus   • Other Other (See Comments)     Other reaction(s): Muscle Relaxers (09/03/2014)     • Tizanidine      Altered mental status     Immunization History   Administered Date(s) Administered   • Hep A, adult 03/02/2005   • INFLUENZA 02/09/2010, 10/14/2011, 09/28/2016, 10/11/2017   • Influenza Split High Dose Preservative Free IM 11/20/2019   • Influenza, high dose seasonal 0.7 mL 11/17/2022   • Pneumococcal Conjugate Vaccine 20-valent (Pcv20), Polysace 05/17/2022   • Pneumococcal Polysaccharide PPV23 01/10/2006, 01/01/2014   • Td (adult), adsorbed 03/08/2002, 10/13/2003   • Tdap 10/06/2010       Objective     /92   Pulse 74   Temp 97.7 °F (36.5 °C) (Tympanic)   Ht 5' 4" (1.626 m)   Wt 71.7 kg (158 lb)   LMP  (LMP Unknown)   SpO2 96%   BMI 27.12 kg/m²     Physical Exam  Vitals and nursing note reviewed. Constitutional:       General: She is not in acute distress. Appearance: Normal appearance. She is not ill-appearing, toxic-appearing or diaphoretic. HENT:      Head: Normocephalic and atraumatic. Eyes:      Conjunctiva/sclera: Conjunctivae normal.   Cardiovascular:      Rate and Rhythm: Normal rate and regular rhythm. Heart sounds: No murmur heard. Pulmonary:      Effort: Pulmonary effort is normal.      Breath sounds: Normal breath sounds. Musculoskeletal:      Cervical back: Normal range of motion and neck supple. Right lower leg: No edema. Left lower leg: No edema. Comments: Wearing post op shoe   Lymphadenopathy:      Cervical: No cervical adenopathy. Neurological:      General: No focal deficit present. Mental Status: She is alert and oriented to person, place, and time.    Psychiatric:         Mood and Affect: Mood normal.       Doron Ralph DO

## 2023-08-01 ENCOUNTER — OFFICE VISIT (OUTPATIENT)
Dept: FAMILY MEDICINE CLINIC | Facility: CLINIC | Age: 73
End: 2023-08-01
Payer: COMMERCIAL

## 2023-08-01 VITALS
WEIGHT: 158 LBS | HEART RATE: 74 BPM | TEMPERATURE: 97.7 F | HEIGHT: 64 IN | OXYGEN SATURATION: 96 % | SYSTOLIC BLOOD PRESSURE: 150 MMHG | DIASTOLIC BLOOD PRESSURE: 92 MMHG | BODY MASS INDEX: 26.98 KG/M2

## 2023-08-01 DIAGNOSIS — E78.5 HYPERLIPIDEMIA, UNSPECIFIED HYPERLIPIDEMIA TYPE: ICD-10-CM

## 2023-08-01 DIAGNOSIS — E03.9 HYPOTHYROIDISM, UNSPECIFIED TYPE: ICD-10-CM

## 2023-08-01 DIAGNOSIS — E11.9 TYPE 2 DIABETES MELLITUS WITHOUT COMPLICATION, WITHOUT LONG-TERM CURRENT USE OF INSULIN (HCC): ICD-10-CM

## 2023-08-01 DIAGNOSIS — F33.9 RECURRENT MAJOR DEPRESSIVE DISORDER, REMISSION STATUS UNSPECIFIED (HCC): ICD-10-CM

## 2023-08-01 DIAGNOSIS — F10.10 ALCOHOL ABUSE: ICD-10-CM

## 2023-08-01 DIAGNOSIS — S92.424D CLOSED NONDISPLACED FRACTURE OF DISTAL PHALANX OF RIGHT GREAT TOE WITH ROUTINE HEALING, SUBSEQUENT ENCOUNTER: Primary | ICD-10-CM

## 2023-08-01 DIAGNOSIS — I10 ESSENTIAL HYPERTENSION: ICD-10-CM

## 2023-08-01 PROCEDURE — 99214 OFFICE O/P EST MOD 30 MIN: CPT | Performed by: FAMILY MEDICINE

## 2023-08-01 RX ORDER — HYDROCHLOROTHIAZIDE 25 MG/1
25 TABLET ORAL DAILY
Qty: 90 TABLET | Refills: 3 | Status: SHIPPED | OUTPATIENT
Start: 2023-08-01

## 2023-08-01 RX ORDER — AMLODIPINE BESYLATE 10 MG/1
10 TABLET ORAL DAILY
Qty: 90 TABLET | Refills: 3 | Status: SHIPPED | OUTPATIENT
Start: 2023-08-01

## 2023-08-01 RX ORDER — LOSARTAN POTASSIUM 100 MG/1
100 TABLET ORAL DAILY
Qty: 90 TABLET | Refills: 3 | Status: SHIPPED | OUTPATIENT
Start: 2023-08-01

## 2023-08-01 RX ORDER — ROSUVASTATIN CALCIUM 20 MG/1
20 TABLET, COATED ORAL DAILY
Qty: 90 TABLET | Refills: 1 | Status: SHIPPED | OUTPATIENT
Start: 2023-08-01

## 2023-08-01 RX ORDER — AMLODIPINE BESYLATE 5 MG/1
5 TABLET ORAL DAILY
Status: CANCELLED | OUTPATIENT
Start: 2023-08-01

## 2023-08-01 RX ORDER — METFORMIN HYDROCHLORIDE 500 MG/1
1000 TABLET, EXTENDED RELEASE ORAL DAILY
Qty: 90 TABLET | Refills: 1 | Status: SHIPPED | OUTPATIENT
Start: 2023-08-01

## 2023-08-01 RX ORDER — SEMAGLUTIDE 1.34 MG/ML
0.5 INJECTION, SOLUTION SUBCUTANEOUS
Qty: 4.5 ML | Refills: 1 | Status: SHIPPED | OUTPATIENT
Start: 2023-08-01

## 2023-08-25 ENCOUNTER — OFFICE VISIT (OUTPATIENT)
Dept: PODIATRY | Facility: CLINIC | Age: 73
End: 2023-08-25

## 2023-08-25 VITALS — HEIGHT: 64 IN | WEIGHT: 158 LBS | BODY MASS INDEX: 26.98 KG/M2

## 2023-08-25 DIAGNOSIS — E11.9 TYPE 2 DIABETES MELLITUS WITHOUT COMPLICATION, WITHOUT LONG-TERM CURRENT USE OF INSULIN (HCC): ICD-10-CM

## 2023-08-25 DIAGNOSIS — S92.414A CLOSED NONDISPLACED FRACTURE OF PROXIMAL PHALANX OF RIGHT GREAT TOE, INITIAL ENCOUNTER: Primary | ICD-10-CM

## 2023-08-25 PROCEDURE — 99024 POSTOP FOLLOW-UP VISIT: CPT | Performed by: PODIATRIST

## 2023-08-25 NOTE — PROGRESS NOTES
Assessment/Plan:   With postop shoe as needed but can return to sneaker when tolerated. Follow-up as needed if pain/discomfort does not completely resolve itself. Diagnoses and all orders for this visit:    Closed nondisplaced fracture of proximal phalanx of right great toe, initial encounter    Type 2 diabetes mellitus without complication, without long-term current use of insulin ([facility])          Subjective:     Patient ID: Gil Ruiz is a 75 y.o. female. 8/25/2023: 41-year-old female presents for follow-up evaluation of right great toe fracture. Reports good progress with much less pain but some swelling persists. 7/14/2023: 41-year-old female injured right great toe approximately 3-4 weeks ago while moving a heavy planter. Reports slipping and kicking the plantar resulting in pain and swelling of her first second and third toes. Patient went to care now and had x-rays which were determined to be negative for fracture. Patient referred here by primary care for continued care of injury to right foot. Patient reports her big toe is still quite swollen and painful. Review of Systems      Objective:     Physical Exam  Constitutional:       Appearance: Normal appearance. HENT:      Head: Normocephalic. Right Ear: External ear normal.      Left Ear: External ear normal.   Eyes:      Pupils: Pupils are equal, round, and reactive to light. Cardiovascular:      Rate and Rhythm: Normal rate. Pulses: Normal pulses. Pulmonary:      Effort: Pulmonary effort is normal.   Musculoskeletal:         General: Swelling and tenderness present. Normal range of motion. Cervical back: Normal range of motion. Comments: Minimal pain with palpation right hallux IPJ, healing fracture of interphalangeal joint within the proximal phalanx head. Diminished range of motion noted. Feet:      Right foot:      Protective Sensation: 10 sites tested. 10 sites sensed.       Skin integrity: Skin integrity normal.      Left foot:      Protective Sensation: 10 sites tested. 10 sites sensed. Skin integrity: Skin integrity normal.   Skin:     General: Skin is warm and dry. Capillary Refill: Capillary refill takes 2 to 3 seconds. Neurological:      General: No focal deficit present. Mental Status: She is alert.    Psychiatric:         Mood and Affect: Mood normal.

## 2023-09-22 DIAGNOSIS — K21.9 GASTROESOPHAGEAL REFLUX DISEASE, UNSPECIFIED WHETHER ESOPHAGITIS PRESENT: ICD-10-CM

## 2023-09-22 RX ORDER — DEXLANSOPRAZOLE 60 MG/1
CAPSULE, DELAYED RELEASE ORAL
Qty: 90 CAPSULE | Refills: 0 | Status: SHIPPED | OUTPATIENT
Start: 2023-09-22

## 2023-10-26 ENCOUNTER — TELEPHONE (OUTPATIENT)
Dept: PSYCHIATRY | Facility: CLINIC | Age: 73
End: 2023-10-26

## 2023-10-26 NOTE — TELEPHONE ENCOUNTER
Was calling in regards to therapy wait list and therapy anywhere. LVM for pt to contact intake dept.

## 2023-10-27 NOTE — TELEPHONE ENCOUNTER
Patient called in returning the intake coordinators phone call.  Patient requested having call backs go to her cell phone number [phone]

## 2023-10-27 NOTE — TELEPHONE ENCOUNTER
Behavioral Health Outpatient Intake Questions    Referred By   : PCP Referral     Please advise interviewee that they need to answer all questions truthfully to allow for best care, and any misrepresentations of information may affect their ability to be seen at this clinic   => Was this discussed? Yes     If Minor Child (under age 25)    Who is/are the legal guardian(s) of the child? Is there a custody agreement? No     If "YES"- Custody orders must be obtained prior to scheduling the first appointment  In addition, Consent to Treatment must be signed by all legal guardians prior to scheduling the first appointment    If "NO"- Consent to Treatment must be signed by all legal guardians prior to scheduling the first appointment      [address] History -     Presenting Problem (in patient's own words): Pt stated that she is going through a lot of family stress and would like someone to talk too. Are there any communication barriers for this patient? No                                               If yes, please describe barriers:  NONE   If there is a unique situation, please refer to [address] for final determination. Are you taking any psychiatric medications? No     If "YES" -What are they  NONE       If "YES" -Who prescribes? Has the Patient previously received outpatient Talk Therapy or Medication Management from [facility]  Yes        If "YES"- When, Where and with Whom? 40 years ago but does not remember name         If "NO" -Has Patient received these services elsewhere? If "YES" -When, Where, and with Whom? Has the Patient abused alcohol or other substances in the last 6 months ? No  No concerns of substance abuse are reported. If "YES" -What substance, How much, How often? If illegal substance: Refer to [facility] (for [facility]) or [facility].    If Alcohol in excess of 10 drinks per week:  Refer to [facility] (for [facility]) or [facility]    Legal History-     Is this treatment court ordered? No   If "yes "send to : Talk Therapy : Send to [address] for final determination   Med Management: Send to Dr Jeff Lyons for final determination     Has the Patient been convicted of a felony? No   If "Yes" send to -When, What? Talk Therapy : Send to [address] for final determination   Med Management: Send to Dr Jeff Lyons for final determination     ACCEPTED as a patient Yes  If "Yes" Appointment Date: 10/30/2023    Referred Elsewhere? No  If “Yes” - (Where? Ex: [facility], [facility], [address] [place] [address], etc.)       Name of [address] [place] [address] Rd [address] [place][phone]  Insurance Phone #  If ins is primary or secondary? Primary  If patient is a minor, parents information such as Name, D. O.B of guarantor.

## 2023-10-30 ENCOUNTER — TELEMEDICINE (OUTPATIENT)
Dept: PSYCHIATRY | Facility: CLINIC | Age: 73
End: 2023-10-30
Payer: COMMERCIAL

## 2023-10-30 DIAGNOSIS — F43.23 ADJUSTMENT DISORDER WITH MIXED ANXIETY AND DEPRESSED MOOD: Primary | ICD-10-CM

## 2023-10-30 PROCEDURE — 90791 PSYCH DIAGNOSTIC EVALUATION: CPT | Performed by: SOCIAL WORKER

## 2023-11-01 PROBLEM — F43.23 ADJUSTMENT DISORDER WITH MIXED ANXIETY AND DEPRESSED MOOD: Status: ACTIVE | Noted: 2023-11-01

## 2023-11-01 NOTE — PSYCH
Behavioral Health Psychotherapy Assessment    Date of Initial Psychotherapy Assessment: 10/30/23  Referral Source: Self  Has a release of information been signed for the referral source? NA    Preferred Name: Asad Davis  Preferred Pronouns: She/her  YOB: 1950 Age: 75 y.o. Sex assigned at birth: female   Gender Identity: Female  Race:   Preferred Language: English    Emergency Contact:  Full Name: Ady Clarke   Relationship to Client: son  Contact information: [phone]     Primary Care Physician:  Sudarshan Galloway, [address] [address]  [phone]  Has a release of information been signed? NA    Physical Health History:  Past surgical procedures: Two pregnancies-- surgery on my right eye; tonsils removed. .   Do you have a history of any of the following: diabetes and other high blood pressre; thyroid issues   Do you have any mobility issues? No    Relevant Family History:  Son and daughter don't have much of a relationship anymore; Mother-- suffered a heart attack-- depression following but not chronic. Client's mother, father, sister, and several friends have passed within her life time. She has buried 8 people this year. Presenting Problem (What brings you in?)  I want my family together. .. getting along; sometimes I am sad when I think of their father missing out on events; sometimes I feel guilty about if I did a good job raising them. .     Mental Health Advance Directive:  Do you currently have a Mental Health Advance Directive? no    Diagnosis:  No diagnosis found.     Initial Assessment:     Current Mental Status:    Appearance: appropriate and casual      Behavior/Manner: cooperative and tearful      Affect/Mood:  Stable and sad    Speech:  Normal    Sleep:  Normal    Oriented to: oriented to self, oriented to place and oriented to time       Clinical Symptoms    Depression: yes      Anxiety: yes      Depression Symptoms: depressed mood and serious loss of interest in things      Anxiety Symptoms: nervous/anxious and difficulty controlling worry      Have you ever been assaultive to others or the environment: No      Have you ever been self-injurious: No      Counseling History:  Previous Counseling or Treatment  (Mental Health or Drug & Alcohol): No    Have you previously taken psychiatric medications: No      Suicide Risk Assessment  Have you ever had a suicide attempt: No    Have you had incidents of suicidal ideation: No    Are you currently experiencing suicidal thoughts: No      Substance Abuse/Addiction Assessment:  Alcohol: Yes    Frequency:  Daily  Heroin: No    Fentanyl: No    Opiates: No    Cocaine: No    Amphetamines: No    Hallucinogens: No    Club Drugs: No    Benzodiazepines: No    Other Rx Meds: No    Marijuana: No    Tobacco/Nicotine: No    Have you experienced blackouts as a result of substance use: No    Have you had any periods of abstinence: No    Have you experienced symptoms of withdrawal: No    Have you ever overdosed on any substances?: No    Are you currently using any Medication Assisted Treatment for Substance Use: No      Compulsive Behaviors:  Compulsive Behavior Information:  Client reports that she drinks to relax, states that she enjoys drinking. She has a glass of wine when she cooks dinner, and a nother class or 2 during dinner. Client keeps track of how much she drinks-- she poors her wine in a smaller bottle to monitor how much she has had. She never goes past drinking a small bottle, and doesn't always drink an entire bottle.     Client reports that her daughter tells her she drinks too much    Disordered Eating History:  Do you have a history of disordered eating: No      Social Determinants of Health:    SDOH:  Stress and addiction    Trauma and Abuse History:    Have you ever been abused: No      Legal History:    Have you ever been arrested  or had a DUI: No      Have you been incarcerated: No      Are you currently on parole/probation: No      Any current Children and Youth involvement: No      Any pending legal charges: No      Relationship History:    Current marital status:       Natural Supports:  Friends and other    Other natural supports:  Daughter, son    Relationship History:  Grand children-- 16y/o-- she may have narcissim. Yuliet Contreras she has low empathy from anyone. . she is failing out of school--its not my place to say anything to my daughter in law. .     I have a good relationship with my children and their spouses; theres a lot that I want to say and help with with granddaughter. . but its not my place to do so. Employment History    Are you currently employed: No      Currently seeking employment: No      Sources of income/financial support:  group home [employer] and custodial Pension    Recommended Treatment:     Psychotherapy:  Individual sessions and Family sessions    Frequency:  4 times    Session frequency:  Monthly    Visit start and stop times:    10/30/23     Virtual Regular Visit    Verification of patient location:    Patient is located at Home in the following state in which I hold an active license [place]      Assessment/Plan:    Problem List Items Addressed This Visit    None      Goals addressed in session: Goal 1          Reason for visit is No chief complaint on file. Encounter provider Maida Grossman [address] [place] [address]    Provider located at [address] [place] [id number] [phone]      Recent Visits  Date Type Provider Dept   10/30/23 Telemedicine [address] [facility]   Showing recent visits within past 7 days and meeting all other requirements  Future Appointments  No visits were found meeting these conditions. Showing future appointments within next 150 days and meeting all other requirements       The patient was identified by name and date of birth.  Marti Moseley Cj was informed that this is a telemedicine visit and that the visit is being conducted throughthe "[employer]" platform. She agrees to proceed. .  My office door was closed. No one else was in the room. She acknowledged consent and understanding of privacy and security of the video platform. The patient has agreed to participate and understands they can discontinue the visit at any time. Patient is aware this is a billable service. Madi Gregorio is a 75 y.o. female . 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 Sofy Atwood.  recall 5 years       Past Surgical History:   Procedure Laterality Date    BREAST BIOPSY Right     benign    CARDIAC CATHETERIZATION      CATARACT EXTRACTION, BILATERAL      COLONOSCOPY      CORNEA LACERATION REPAIR Right     REFRACTIVE SURGERY      TONSILLECTOMY      UPPER GASTROINTESTINAL ENDOSCOPY      WISDOM TOOTH EXTRACTION         Current Outpatient Medications   Medication Sig Dispense Refill    albuterol (PROVENTIL HFA,VENTOLIN HFA) 90 mcg/act inhaler Inhale 2 puffs 4 (four) times a day      amLODIPine (NORVASC) 10 mg tablet Take 1 tablet (10 mg total) by mouth daily 90 tablet 3    ascorbic acid (VITAMIN C) 500 mg tablet Take 500 mg by mouth daily      Biotin 5000 MCG CAPS Take 5,000 mcg by mouth in the morning      budesonide (PULMICORT) 0.5 mg/2 mL nebulizer solution Take 0.5 mg by nebulization as needed in the morning. Rinse mouth after use.  .      calcium carbonate (OS-BRITTA) 600 MG tablet Take 600 mg by mouth 2 (two) times a day with meals      calcium polycarbophil (FIBERCON) 625 mg tablet Take 1 tablet (625 mg total) by mouth daily 30 tablet 11    cholecalciferol (VITAMIN D3) 1,000 units tablet Take 1,000 Units by mouth daily      dexlansoprazole (DEXILANT) 60 MG capsule TAKE 1  BY MOUTH ONCE DAILY 90 capsule 0    GUAIFENESIN 1200 PO Take 1,500 mg by mouth 2 (two) times a day      hydrochlorothiazide (HYDRODIURIL) 25 mg tablet Take 1 tablet (25 mg total) by mouth daily 90 tablet 3    ipratropium-albuterol, FOR EMS ONLY, (DUO-NEB) 0.5-2.5 mg/3 mL nebulizer solution Use 3 mL as needed (Patient not taking: Reported on 8/1/2023)      losartan (COZAAR) 100 MG tablet Take 1 tablet (100 mg total) by mouth daily 90 tablet 3    metFORMIN (GLUCOPHAGE-XR) 500 mg 24 hr tablet Take 2 tablets (1,000 mg total) by mouth daily 90 tablet 1    nitroglycerin (NITROSTAT) 0.4 mg SL tablet  (Patient not taking: Reported on 8/1/2023)      Ozempic, 0.25 or 0.5 MG/DOSE, 2 MG/1.5ML injection pen Inject 0.38 mL (0.5 mg total) under the skin every 7 days 4.5 mL 1    potassium chloride (K-DUR,KLOR-CON) 20 mEq tablet Take 20 mEq by mouth daily        Qvar RediHaler 40 MCG/ACT inhaler Inhale 2 puffs 2 (two) times a day (Patient not taking: Reported on 8/1/2023)      rosuvastatin (CRESTOR) 20 MG tablet Take 1 tablet (20 mg total) by mouth daily 90 tablet 1    SYNTHROID 75 MCG tablet Take 75 mcg by mouth daily in the early morning        venlafaxine (EFFEXOR-XR) 150 mg 24 hr capsule Take 1 capsule (150 mg total) by mouth every other day 45 capsule 1    venlafaxine (EFFEXOR-XR) 75 mg 24 hr capsule Take 1 capsule (75 mg total) by mouth every other day 45 capsule 1     No current facility-administered medications for this visit. Allergies   Allergen Reactions    Epinephrine Shortness Of Breath and Palpitations    Carisoprodol Other (See Comments)     Other reaction(s): SOMA (11/04/2010)      Irbesartan-Hydrochlorothiazide Other (See Comments)     Possible lichen planus    Lisinopril      Lichen planus    Other Other (See Comments)     Other reaction(s): Muscle Relaxers (09/03/2014)      Tizanidine      Altered mental status       Review of Systems   Psychiatric/Behavioral:  The patient is nervous/anxious. Video Exam    There were no vitals filed for this visit. Physical Exam  Psychiatric:         Mood and Affect: Affect is tearful. Behavior: Behavior is cooperative.

## 2023-11-02 ENCOUNTER — TELEPHONE (OUTPATIENT)
Dept: PSYCHIATRY | Facility: CLINIC | Age: 73
End: 2023-11-02

## 2023-11-09 ENCOUNTER — TELEMEDICINE (OUTPATIENT)
Dept: PSYCHIATRY | Facility: CLINIC | Age: 73
End: 2023-11-09
Payer: COMMERCIAL

## 2023-11-09 DIAGNOSIS — F43.23 ADJUSTMENT DISORDER WITH MIXED ANXIETY AND DEPRESSED MOOD: Primary | ICD-10-CM

## 2023-11-09 PROCEDURE — 90837 PSYTX W PT 60 MINUTES: CPT | Performed by: SOCIAL WORKER

## 2023-11-09 NOTE — BH TREATMENT PLAN
[facility]  1950     Date of Initial Psychotherapy Assessment: 10/30/23   Date of Current Treatment Plan: 11/10/23  Treatment Plan Target Date: 11/9/24  Treatment Plan Expiration Date: 5/9/24    Diagnosis:   1. Adjustment disorder with mixed anxiety and depressed mood            Area(s) of Need: Lately I have been down too much. I will sit and start crying. . I miss my home and my neighbors. . I feel like someone is watching me. My son. . ill be home tonight. I dont know the solution. . he is difficult to deal with. . if I wasn't here. .. I wouldn't know what's going on in the house, but I do and I dont know how to fix it. Long Term Goal 1 (in the client's own words): I need to control the situation. . I want to be happy. ..      Stage of Change: Action    Target Date for completion: 11/9/24     Anticipated therapeutic modalities: mindfulness strategies; solution focused; CBT; Client centered      People identified to complete this goal: Clinician and client       Objective 1: (identify the means of measuring success in meeting the objective): Client will acknowledge 3 ways her life has changed since moving underneath her son; Client will accept the things that she cannot change, the differences within her life--adjusting to what is available to her now; Client will practice exploring her needs so as to identify at least 3 boundaries to set with her family; Client will practice utilizing assertive communication with her family at least once a day; Client will practice recognizing what she can and cannot control at least once a week; Client will practice assessing her body to identify tension, stress, anxiety, sadness 5/5x; client will identify and develop at least 3 coping skills to address her negative emotions; client will implement a routine where she has a social encounter at least once a week; client will exercise, go outside at least once a day to lift her mood; client will drink only when she is already feeling happy--and wanting to increase her happiness. Objective 2: (identify the means of measuring success in meeting the objective): NA      Long Term Goal 2 (in the client's own words): NA    Stage of Change: Pre-contemplation    Target Date for completion: NA     Anticipated therapeutic modalities: NA     People identified to complete this goal: NA      Objective 1: (identify the means of measuring success in meeting the objective): NA      Objective 2: (identify the means of measuring success in meeting the objective): NA     Long Term Goal 3 (in the client's own words): NA    Stage of Change: Pre-contemplation    Target Date for completion: NA     Anticipated therapeutic modalities: NA     People identified to complete this goal: NA      Objective 1: (identify the means of measuring success in meeting the objective): NA      Objective 2: (identify the means of measuring success in meeting the objective): NA     I am currently under the care of a [facility] psychiatric provider: no    My [facility] psychiatric provider is: NA    I am currently taking psychiatric medications: No    I feel that I will be ready for discharge from mental health care when I reach the following (measurable goal/objective): I will be closer to my friends; I will be able to socialize more often; I would feel independent In my home. Client will have not had a crying spell in at least 30 consecutive days. For children and adults who have a legal guardian:   Has there been any change to custody orders and/or guardianship status? NA. If yes, attach updated documentation. I have created my Crisis Plan and have been offered a copy of this plan    [address]: Diagnosis and Treatment Plan explained to [employer] acknowledges an understanding of their diagnosis. Marino Mosher agrees to this treatment plan.     I have been offered a copy of this Treatment Plan. yes

## 2023-11-10 NOTE — PSYCH
Behavioral Health Psychotherapy Progress Note    Psychotherapy Provided: Individual Psychotherapy     No diagnosis found. Goals addressed in session: Goal 1     DATA: Clinician explored client's current stressors-- assisted client in recognizing what is in and out of her control. Clinician and client brainstormed ways to bring up her concerns to her children in a constructive way. Clinician encouraged client to place boundaries on her family in regard to her personal space. During this session, this clinician used the following therapeutic modalities: Client-centered Therapy, Solution-Focused Therapy, and Supportive Psychotherapy    Substance Abuse was notaddressed during this session. If the client is diagnosed with a co-occurring substance use disorder, please indicate any changes in the frequency or amount of use: NA. Stage of change for addressing substance use diagnoses: No substance use/Not applicable    ASSESSMENT:  Bubba Grewal presents with a Euthymic/ normal mood. her affect is Normal range and intensity, which is congruent, with her mood and the content of the session. The client has made progress on their goals. Bubba Grewal presents with a none risk of suicide, none risk of self-harm, and none risk of harm to others. For any risk assessment that surpasses a "low" rating, a safety plan must be developed. A safety plan was indicated: no  If yes, describe in detail NA    PLAN: Between sessions, Bubba Grewal will journal what she wants to address to her family. At the next session, the therapist will use Client-centered Therapy to address ongoing stressors. Behavioral Health Treatment Plan and Discharge Planning: Bubba Grewal is aware of and agrees to continue to work on their treatment plan. They have identified and are working toward their discharge goals.  yes    Visit start and stop times:    11/9/23  Start Time: 1525  Stop Time: 1503  Total Visit Time: 61 minutes  Virtual Regular Visit    Verification of patient location:    Patient is located at Home in the following state in which I hold an active license [place]      Assessment/Plan:    Problem List Items Addressed This Visit    None      Goals addressed in session: Goal 1          Reason for visit is No chief complaint on file. Encounter provider Jerald Woodward    Provider located at [address] [place] [address] Road [address] [phone]      Recent Visits  Date Type Provider Dept   11/09/23 Telemedicine [facility] [place]   Showing recent visits within past 7 days and meeting all other requirements  Future Appointments  No visits were found meeting these conditions. Showing future appointments within next 150 days and meeting all other requirements       The patient was identified by name and date of birth. Donna Nance was informed that this is a telemedicine visit and that the visit is being conducted throughthe [employer] platform. She agrees to proceed. .  My office door was closed. No one else was in the room. She acknowledged consent and understanding of privacy and security of the video platform. The patient has agreed to participate and understands they can discontinue the visit at any time. Patient is aware this is a billable service. Subjective  Donna Nance is a 75 y.o. female  .       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 Taylor [facility]. recall 5 years       Past Surgical History:   Procedure Laterality Date    BREAST BIOPSY Right     benign    CARDIAC CATHETERIZATION      CATARACT EXTRACTION, BILATERAL      COLONOSCOPY      CORNEA LACERATION REPAIR Right     REFRACTIVE SURGERY      TONSILLECTOMY      UPPER GASTROINTESTINAL ENDOSCOPY      WISDOM TOOTH EXTRACTION         Current Outpatient Medications   Medication Sig Dispense Refill    albuterol (PROVENTIL HFA,VENTOLIN HFA) 90 mcg/act inhaler Inhale 2 puffs 4 (four) times a day      amLODIPine (NORVASC) 10 mg tablet Take 1 tablet (10 mg total) by mouth daily 90 tablet 3    ascorbic acid (VITAMIN C) 500 mg tablet Take 500 mg by mouth daily      Biotin 5000 MCG CAPS Take 5,000 mcg by mouth in the morning      budesonide (PULMICORT) 0.5 mg/2 mL nebulizer solution Take 0.5 mg by nebulization as needed in the morning. Rinse mouth after use.  .      calcium carbonate (OS-BRITTA) 600 MG tablet Take 600 mg by mouth 2 (two) times a day with meals      calcium polycarbophil (FIBERCON) 625 mg tablet Take 1 tablet (625 mg total) by mouth daily 30 tablet 11    cholecalciferol (VITAMIN D3) 1,000 units tablet Take 1,000 Units by mouth daily      dexlansoprazole (DEXILANT) 60 MG capsule TAKE 1  BY MOUTH ONCE DAILY 90 capsule 0    GUAIFENESIN 1200 PO Take 1,500 mg by mouth 2 (two) times a day      hydrochlorothiazide (HYDRODIURIL) 25 mg tablet Take 1 tablet (25 mg total) by mouth daily 90 tablet 3    ipratropium-albuterol, FOR EMS ONLY, (DUO-NEB) 0.5-2.5 mg/3 mL nebulizer solution Use 3 mL as needed (Patient not taking: Reported on 8/1/2023)      losartan (COZAAR) 100 MG tablet Take 1 tablet (100 mg total) by mouth daily 90 tablet 3    metFORMIN (GLUCOPHAGE-XR) 500 mg 24 hr tablet Take 2 tablets (1,000 mg total) by mouth daily 90 tablet 1    nitroglycerin (NITROSTAT) 0.4 mg SL tablet  (Patient not taking: Reported on 8/1/2023)      Ozempic, 0.25 or 0.5 MG/DOSE, 2 MG/1.5ML injection pen Inject 0.38 mL (0.5 mg total) under the skin every 7 days 4.5 mL 1    potassium chloride (K-DUR,KLOR-CON) 20 mEq tablet Take 20 mEq by mouth daily        Qvar RediHaler 40 MCG/ACT inhaler Inhale 2 puffs 2 (two) times a day (Patient not taking: Reported on 8/1/2023)      rosuvastatin (CRESTOR) 20 MG tablet Take 1 tablet (20 mg total) by mouth daily 90 tablet 1    SYNTHROID 75 MCG tablet Take 75 mcg by mouth daily in the early morning        venlafaxine (EFFEXOR-XR) 150 mg 24 hr capsule Take 1 capsule (150 mg total) by mouth every other day 45 capsule 1    venlafaxine (EFFEXOR-XR) 75 mg 24 hr capsule Take 1 capsule (75 mg total) by mouth every other day 45 capsule 1     No current facility-administered medications for this visit. Allergies   Allergen Reactions    Epinephrine Shortness Of Breath and Palpitations    Carisoprodol Other (See Comments)     Other reaction(s): SOMA (11/04/2010)      Irbesartan-Hydrochlorothiazide Other (See Comments)     Possible lichen planus    Lisinopril      Lichen planus    Other Other (See Comments)     Other reaction(s): Muscle Relaxers (09/03/2014)      Tizanidine      Altered mental status       Review of Systems    Video Exam    There were no vitals filed for this visit.     Physical Exam

## 2023-11-14 ENCOUNTER — TELEPHONE (OUTPATIENT)
Dept: PSYCHIATRY | Facility: CLINIC | Age: 73
End: 2023-11-14

## 2023-11-14 ENCOUNTER — TELEMEDICINE (OUTPATIENT)
Dept: PSYCHIATRY | Facility: CLINIC | Age: 73
End: 2023-11-14
Payer: COMMERCIAL

## 2023-11-14 DIAGNOSIS — F43.23 ADJUSTMENT DISORDER WITH MIXED ANXIETY AND DEPRESSED MOOD: Primary | ICD-10-CM

## 2023-11-14 PROCEDURE — 90834 PSYTX W PT 45 MINUTES: CPT | Performed by: SOCIAL WORKER

## 2023-11-14 NOTE — TELEPHONE ENCOUNTER
Patient called with assistance to connect to virtual visit. Writer reached out to provider to see if link can be resent, patient was able to connect for appointment.

## 2023-11-15 ENCOUNTER — OFFICE VISIT (OUTPATIENT)
Dept: FAMILY MEDICINE CLINIC | Facility: CLINIC | Age: 73
End: 2023-11-15
Payer: COMMERCIAL

## 2023-11-15 VITALS
OXYGEN SATURATION: 100 % | HEART RATE: 85 BPM | SYSTOLIC BLOOD PRESSURE: 120 MMHG | RESPIRATION RATE: 18 BRPM | BODY MASS INDEX: 26.75 KG/M2 | DIASTOLIC BLOOD PRESSURE: 80 MMHG | WEIGHT: 151 LBS | HEIGHT: 63 IN | TEMPERATURE: 97.9 F

## 2023-11-15 DIAGNOSIS — R05.9 COUGH, UNSPECIFIED TYPE: ICD-10-CM

## 2023-11-15 DIAGNOSIS — D80.1 HYPOGAMMAGLOBULINEMIA (HCC): ICD-10-CM

## 2023-11-15 DIAGNOSIS — E87.6 HYPOKALEMIA: ICD-10-CM

## 2023-11-15 DIAGNOSIS — D86.9 SARCOIDOSIS: ICD-10-CM

## 2023-11-15 DIAGNOSIS — F10.10 ALCOHOL ABUSE: ICD-10-CM

## 2023-11-15 DIAGNOSIS — J45.991 COUGH VARIANT ASTHMA: Primary | ICD-10-CM

## 2023-11-15 DIAGNOSIS — J45.909 ASTHMATIC BRONCHITIS WITHOUT COMPLICATION, UNSPECIFIED ASTHMA SEVERITY, UNSPECIFIED WHETHER PERSISTENT: ICD-10-CM

## 2023-11-15 DIAGNOSIS — I10 ESSENTIAL HYPERTENSION: ICD-10-CM

## 2023-11-15 LAB
SARS-COV-2 AG UPPER RESP QL IA: NEGATIVE
VALID CONTROL: NORMAL

## 2023-11-15 PROCEDURE — 99214 OFFICE O/P EST MOD 30 MIN: CPT | Performed by: FAMILY MEDICINE

## 2023-11-15 PROCEDURE — 87811 SARS-COV-2 COVID19 W/OPTIC: CPT | Performed by: FAMILY MEDICINE

## 2023-11-15 RX ORDER — SEMAGLUTIDE 0.68 MG/ML
0.5 INJECTION, SOLUTION SUBCUTANEOUS
COMMUNITY
Start: 2023-10-02 | End: 2023-11-15

## 2023-11-15 RX ORDER — DOXYCYCLINE HYCLATE 100 MG/1
100 CAPSULE ORAL EVERY 12 HOURS SCHEDULED
Qty: 14 CAPSULE | Refills: 0 | Status: SHIPPED | OUTPATIENT
Start: 2023-11-15 | End: 2023-11-22

## 2023-11-15 RX ORDER — POTASSIUM CHLORIDE 20 MEQ/1
20 TABLET, EXTENDED RELEASE ORAL DAILY
Qty: 90 TABLET | Refills: 1 | Status: SHIPPED | OUTPATIENT
Start: 2023-11-15

## 2023-11-15 NOTE — PROGRESS NOTES
Name: Parisa Asencio      : 1950      MRN: [id number]  Encounter Provider: Shailesh Roberts DO  Encounter Date: 11/15/2023   Encounter department: [address] [place] [address]     1. Cough variant asthma  With exacerbation  Recently saw her lung doctor and had PFTS and was given prednisone taper which she has since completed. I was able to locate the consultation note from visit with pulmonology in care everywhere. PFT's on 10/18 showed mild obstructive lung disease with mild reduced diffusion capacity. Really no improvement in her sx with this. Rapid covid in office today was negative   Advised her to go for the CXR as ordered by her lung doctor and have copy of report sent here as well. Will start her on doxycycline to cover for any bacterial source given her hypogammaglobulinemia history. Will avoid using another course of prednisone as there is no wheezing on exam and pulmonology noted that they prefer to avoid using this for too long give her aspergillosis hx  Encouraged her to call pulmonology for f/u to let them know she is not feeling any better. 2. Hypogammaglobulinemia (HCC)    3. Sarcoidosis    4. Alcohol abuse  Abstaining. 5. Essential hypertension  BP at goal on current meds. 6. Cough, unspecified type  -     POCT Rapid Covid Ag  See above  7. Hypokalemia  -     potassium chloride (K-DUR,KLOR-CON) 20 mEq tablet; Take 1 tablet (20 mEq total) by mouth daily  Patient needs refill on her potassium today. Rx sent. 8. Asthmatic bronchitis without complication, unspecified asthma severity, unspecified whether persistent  -     doxycycline hyclate (VIBRAMYCIN) 100 mg capsule;  Take 1 capsule (100 mg total) by mouth every 12 (twelve) hours for 7 days         Subjective     HPI  Patient is a 67year old female with hypertension, hyperlipidemia, DM2, hypothyroidism, hypogammaglobulinemia, cough variant asthma, GERD, NAFLD, depression, sarcoidosis, vitamin d deficiency, osteopenia and atherosclerosis here today for complaint of a cough. Has associated shortness of breath. Ongoing for 3 weeks. Patient follows with pulmonology (Dr. Kathryn Davis) for her cough variant asthma, sarcoidosis and notes that she saw her pulmonologist for these sx 3 weeks ago. She states that he did "breathing test" which sounds to me like PFT's in the office and he put her on a prednisone taper. He also ordered a CXR which she has not completed yet. In addition to the steroid she has been using her nebulized budesonide and duonebs    States that she started to feel a little better with respect to the cough and shortness of breath after starting the steroid but but notes that her cough which is productive of yellow sputum has not resolved and now feeling very weak and tired. She has not called pulmonology to let him know that she is not feeling better on what he prescribed. Of  note, patient was seen here last on 8/1/23 for routine f/u at which time bp was not at goal. Her amlodipine was increased from 5 to 10 mg once daily. This seems to be controlling her bp though she notes she did get high reading at home this AM.     Since I saw her last, she started seeing counselor through [facility]. This has helped. She reports that she has continued to avoid alcohol     Sugars have been good.      Review of Systems    Past Medical History:   Diagnosis Date   • Anxiety 03/29/2021   • Cataract 03/29/2021   • cough variant asthma    • COVID-19 03/06/2021    had again 3/29/22 (+antigen test)   • Diabetes mellitus ([address])    • Disease of thyroid gland    • Diverticulosis     colonoscopy 7/31/20   • Fatty liver disease, nonalcoholic    • GERD (gastroesophageal reflux disease)     Last EGD 2016   • 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; treated with voriconazole 200 bid x 12 weeks   • Hx of migraine headaches     pt does not have   • Hyperlipidemia    • Hypertension • Hypogammaglobulinemia [facility])    • Pulmonary embolism (HCC)    • Reactive airway disease    • Restless leg syndrome    • Sepsis (HCC)    • Streptococcal pneumonia (HCC)    • Tubular adenoma 2020    Dr. Joanne Enamorado. recall 5 years     Past Surgical History:   Procedure Laterality Date   • BREAST BIOPSY Right     benign   • CARDIAC CATHETERIZATION     • CATARACT EXTRACTION, BILATERAL     • COLONOSCOPY     • CORNEA LACERATION REPAIR Right    • REFRACTIVE SURGERY     • TONSILLECTOMY     • UPPER GASTROINTESTINAL ENDOSCOPY     • WISDOM TOOTH EXTRACTION       Family History   Problem Relation Age of Onset   • Heart disease Mother    • Heart disease Father    • Stroke Sister    • Heart disease Sister    • Lung cancer Sister 72   • No Known Problems Daughter    • No Known Problems Maternal Grandmother    • No Known Problems Maternal Grandfather    • No Known Problems Paternal Grandmother    • No Known Problems Paternal Grandfather    • Heart disease Brother    • No Known Problems Maternal Aunt    • No Known Problems Maternal Aunt    • No Known Problems Maternal Aunt    • No Known Problems Maternal Aunt    • No Known Problems Paternal Aunt    • No Known Problems Paternal Aunt    • Colon polyps Neg Hx    • Colon cancer Neg Hx      Social History     Socioeconomic History   • Marital status:      Spouse name: None   • Number of children: None   • Years of education: None   • Highest education level: None   Occupational History   • None   Tobacco Use   • Smoking status: Former     Packs/day: 0.25     Years: 25.00     Total pack years: 6.25     Types: Cigarettes     Start date:      Quit date:      Years since quittin.8   • Smokeless tobacco: Never   Vaping Use   • Vaping Use: Never used   Substance and Sexual Activity   • Alcohol use:  Yes     Alcohol/week: 14.0 standard drinks of alcohol     Types: 14 Glasses of wine per week   • Drug use: Not Currently   • Sexual activity: Not Currently   Other Topics Concern   • None   Social History Narrative        Lives in in-law suite with son and his family    Used to work for [employer] as a     Likes to [place] and read     Social Determinants of [address] Strain: [address]  (5/17/2023)    Overall Financial Resource Strain (CARDIA)    • Difficulty of Paying Living Expenses: Not hard at all   Food Insecurity: Not on file   Transportation Needs: No Transportation Needs (5/17/2023)    PRAPARE - Transportation    • Lack of Transportation (Medical): No    • Lack of Transportation (Non-Medical): No   Physical Activity: Not on file   Stress: Not on file   Social Connections: Not on file   Intimate Partner Violence: Not on file   Housing Stability: Not on file     Current Outpatient Medications on File Prior to Visit   Medication Sig   • albuterol (PROVENTIL HFA,VENTOLIN HFA) 90 mcg/act inhaler Inhale 2 puffs 4 (four) times a day   • amLODIPine (NORVASC) 10 mg tablet Take 1 tablet (10 mg total) by mouth daily   • ascorbic acid (VITAMIN C) 500 mg tablet Take 500 mg by mouth daily   • Biotin 5000 MCG CAPS Take 5,000 mcg by mouth in the morning   • budesonide (PULMICORT) 0.5 mg/2 mL nebulizer solution Take 0.5 mg by nebulization as needed in the morning. Rinse mouth after use.  .   • calcium carbonate (OS-BRITTA) 600 MG tablet Take 600 mg by mouth 2 (two) times a day with meals   • calcium polycarbophil (FIBERCON) 625 mg tablet Take 1 tablet (625 mg total) by mouth daily   • cholecalciferol (VITAMIN D3) 1,000 units tablet Take 1,000 Units by mouth daily   • dexlansoprazole (DEXILANT) 60 MG capsule TAKE 1  BY MOUTH ONCE DAILY   • GUAIFENESIN 1200 PO Take 1,500 mg by mouth 2 (two) times a day   • hydrochlorothiazide (HYDRODIURIL) 25 mg tablet Take 1 tablet (25 mg total) by mouth daily   • ipratropium-albuterol, FOR EMS ONLY, (DUO-NEB) 0.5-2.5 mg/3 mL nebulizer solution Use 3 mL as needed   • losartan (COZAAR) 100 MG tablet Take 1 tablet (100 mg total) by mouth daily   • metFORMIN (GLUCOPHAGE-XR) 500 mg 24 hr tablet Take 2 tablets (1,000 mg total) by mouth daily   • Ozempic, 0.25 or 0.5 MG/DOSE, 2 MG/1.5ML injection pen Inject 0.38 mL (0.5 mg total) under the skin every 7 days   • Qvar RediHaler 40 MCG/ACT inhaler Inhale 2 puffs 2 (two) times a day   • rosuvastatin (CRESTOR) 20 MG tablet Take 1 tablet (20 mg total) by mouth daily   • SYNTHROID 75 MCG tablet Take 75 mcg by mouth daily in the early morning     • venlafaxine (EFFEXOR-XR) 150 mg 24 hr capsule Take 1 capsule (150 mg total) by mouth every other day   • venlafaxine (EFFEXOR-XR) 75 mg 24 hr capsule Take 1 capsule (75 mg total) by mouth every other day   • [DISCONTINUED] potassium chloride (K-DUR,KLOR-CON) 20 mEq tablet Take 20 mEq by mouth daily     • nitroglycerin (NITROSTAT) 0.4 mg SL tablet  (Patient not taking: Reported on 8/1/2023)   • [DISCONTINUED] Ozempic, 0.25 or 0.5 MG/DOSE, 2 MG/3ML injection pen Inject 0.5 mg under the skin every 7 days (Patient not taking: Reported on 11/15/2023)     Allergies   Allergen Reactions   • Epinephrine Shortness Of Breath and Palpitations   • Carisoprodol Other (See Comments)     Other reaction(s): SOMA (11/04/2010)     • Irbesartan-Hydrochlorothiazide Other (See Comments)     Possible lichen planus   • Lisinopril      Lichen planus   • Other Other (See Comments)     Other reaction(s): Muscle Relaxers (09/03/2014)     • Tizanidine      Altered mental status     Immunization History   Administered Date(s) Administered   • Hep A, adult 03/02/2005   • INFLUENZA 02/09/2010, 10/14/2011, 09/28/2016, 10/11/2017   • Influenza Split High Dose Preservative Free IM 11/20/2019   • Influenza, high dose seasonal 0.7 mL 11/17/2022   • Pneumococcal Conjugate Vaccine 20-valent (Pcv20), Polysace 05/17/2022   • Pneumococcal Polysaccharide PPV23 01/10/2006, 01/01/2014   • Td (adult), adsorbed 03/08/2002, 10/13/2003   • Tdap 10/06/2010       Objective     /80 (BP Location: Left arm, Patient Position: Sitting, Cuff Size: Standard)   Pulse 85   Temp 97.9 °F (36.6 °C) (Tympanic)   Resp 18   Ht 5' 3" (1.6 m)   Wt 68.5 kg (151 lb)   LMP  (LMP Unknown)   SpO2 100%   BMI 26.75 kg/m²     Physical Exam  Vitals and nursing note reviewed. Constitutional:       General: She is not in acute distress. Appearance: Normal appearance. She is not ill-appearing, toxic-appearing or diaphoretic. HENT:      Head: Normocephalic. Right Ear: Tympanic membrane normal.      Left Ear: Tympanic membrane normal.      Nose: Nose normal.      Mouth/Throat:      Mouth: Mucous membranes are moist.      Pharynx: No oropharyngeal exudate or posterior oropharyngeal erythema. Eyes:      Extraocular Movements: Extraocular movements intact. Conjunctiva/sclera: Conjunctivae normal.      Pupils: Pupils are equal, round, and reactive to light. Cardiovascular:      Rate and Rhythm: Normal rate and regular rhythm. Heart sounds: No murmur heard. Pulmonary:      Effort: Pulmonary effort is normal. No respiratory distress. Breath sounds: No wheezing, rhonchi or rales. Comments: No increased work of breathing  Abdominal:      General: Abdomen is flat. Bowel sounds are normal.      Palpations: Abdomen is soft. Musculoskeletal:      Cervical back: Normal range of motion and neck supple. Right lower leg: No edema. Left lower leg: No edema. Lymphadenopathy:      Cervical: No cervical adenopathy. Skin:     General: Skin is warm and dry. Findings: No rash. Neurological:      Mental Status: She is alert.    Psychiatric:         Mood and Affect: Mood normal.       Estela Turcios DO

## 2023-11-17 ENCOUNTER — VBI (OUTPATIENT)
Dept: ADMINISTRATIVE | Facility: OTHER | Age: 73
End: 2023-11-17

## 2023-11-17 NOTE — TELEPHONE ENCOUNTER
11/17/23 12:57 PM    Patient contacted (spoke with patient) to bring Advance Directive, POLST, or Living Will document to next scheduled pcp visit. Thank you.   Daisy Partida  [facility]

## 2023-11-20 NOTE — PSYCH
Behavioral Health Psychotherapy Progress Note    Psychotherapy Provided: Individual Psychotherapy     1. Adjustment disorder with mixed anxiety and depressed mood            Goals addressed in session: Goal 1     DATA: Clinician explored client's current stressors--affirmed her efforts in trying to communicate her emotions to her son. Clinician and client discussed effective communication-- "you" statements being more accusatory and instigating defensive behavior vs "I feel statements" Clinician and client discussed boundaries within the house-- wanting to increase her respect and privacy within the house. During this session, this clinician used the following therapeutic modalities: Client-centered Therapy, Cognitive Behavioral Therapy, Solution-Focused Therapy, and Supportive Psychotherapy    Substance Abuse was not addressed during this session. If the client is diagnosed with a co-occurring substance use disorder, please indicate any changes in the frequency or amount of use: NA. Stage of change for addressing substance use diagnoses: No substance use/Not applicable    ASSESSMENT:  Jesse Akbar presents with a Euthymic/ normal mood. her affect is Normal range and intensity, which is congruent, with her mood and the content of the session. The client has made progress on their goals. Jesse Akbar presents with a none risk of suicide, none risk of self-harm, and none risk of harm to others. For any risk assessment that surpasses a "low" rating, a safety plan must be developed. A safety plan was indicated: no  If yes, describe in detail NA    PLAN: Between sessions, Jesse Akbar will continue to edit her rough draft to communicate with I feel statements. At the next session, the therapist will use Client-centered Therapy to address ongoing stressors.     Behavioral Health Treatment Plan and Discharge Planning: Jesse Akbar is aware of and agrees to continue to work on their treatment plan. They have identified and are working toward their discharge goals. yes    Visit start and stop times:    11/14/23  Start Time: 1608  Stop Time: 1659  Total Visit Time: 51 minutes  Virtual Regular Visit    Verification of patient location:    Patient is located at Home in the following state in which I hold an active license [place]      Assessment/Plan:    Problem List Items Addressed This Visit          Psychiatry Problems    Adjustment disorder with mixed anxiety and depressed mood - Primary       Goals addressed in session: Goal 1          Reason for visit is No chief complaint on file. Encounter provider Jerald Aparicio    Provider located at [address] [place] [id number] [phone]      Recent Visits  Date Type Provider Dept   11/15/23 Office Visit Hasmukh Dempsey DO [facility]   11/14/23 Telephone [address] [address] [facility]   11/14/23 Telemedicine [facility] [place], [place] [facility]   Showing recent visits within past 7 days and meeting all other requirements  Future Appointments  No visits were found meeting these conditions. Showing future appointments within next 150 days and meeting all other requirements       The patient was identified by name and date of birth. Rocky Gutierrez was informed that this is a telemedicine visit and that the visit is being conducted throughthe Diversion platform. She agrees to proceed. .  My office door was closed. No one else was in the room. She acknowledged consent and understanding of privacy and security of the video platform. The patient has agreed to participate and understands they can discontinue the visit at any time. Patient is aware this is a billable service. Subjective  Rocky Gutierrez is a 75 y.o. female  .       HPI     Past Medical History:   Diagnosis Date    Anxiety 03/29/2021    Cataract 03/29/2021    cough variant asthma     COVID-19 03/06/2021    had again 3/29/22 (+antigen test)    Diabetes mellitus ([address])     Disease of thyroid gland     Diverticulosis     colonoscopy 7/31/20    Fatty liver disease, nonalcoholic     GERD (gastroesophageal reflux disease)     Last EGD 2016    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; treated with voriconazole 200 bid x 12 weeks    Hx of migraine headaches     pt does not have    Hyperlipidemia     Hypertension     Hypogammaglobulinemia ([address])     Pulmonary embolism (HCC)     Reactive airway disease     Restless leg syndrome     Sepsis ([address])     Streptococcal pneumonia ([address])     Tubular adenoma 07/31/2020    Dr. Connie Blankenship. recall 5 years       Past Surgical History:   Procedure Laterality Date    BREAST BIOPSY Right     benign    CARDIAC CATHETERIZATION      CATARACT EXTRACTION, BILATERAL      COLONOSCOPY      CORNEA LACERATION REPAIR Right     REFRACTIVE SURGERY      TONSILLECTOMY      UPPER GASTROINTESTINAL ENDOSCOPY      WISDOM TOOTH EXTRACTION         Current Outpatient Medications   Medication Sig Dispense Refill    albuterol (PROVENTIL HFA,VENTOLIN HFA) 90 mcg/act inhaler Inhale 2 puffs 4 (four) times a day      amLODIPine (NORVASC) 10 mg tablet Take 1 tablet (10 mg total) by mouth daily 90 tablet 3    ascorbic acid (VITAMIN C) 500 mg tablet Take 500 mg by mouth daily      Biotin 5000 MCG CAPS Take 5,000 mcg by mouth in the morning      budesonide (PULMICORT) 0.5 mg/2 mL nebulizer solution Take 0.5 mg by nebulization as needed in the morning. Rinse mouth after use.  .      calcium carbonate (OS-BRITTA) 600 MG tablet Take 600 mg by mouth 2 (two) times a day with meals      calcium polycarbophil (FIBERCON) 625 mg tablet Take 1 tablet (625 mg total) by mouth daily 30 tablet 11    cholecalciferol (VITAMIN D3) 1,000 units tablet Take 1,000 Units by mouth daily      dexlansoprazole (DEXILANT) 60 MG capsule TAKE 1  BY MOUTH ONCE DAILY 90 capsule 0    doxycycline hyclate (VIBRAMYCIN) 100 mg capsule Take 1 capsule (100 mg total) by mouth every 12 (twelve) hours for 7 days 14 capsule 0    GUAIFENESIN 1200 PO Take 1,500 mg by mouth 2 (two) times a day      hydrochlorothiazide (HYDRODIURIL) 25 mg tablet Take 1 tablet (25 mg total) by mouth daily 90 tablet 3    ipratropium-albuterol, FOR EMS ONLY, (DUO-NEB) 0.5-2.5 mg/3 mL nebulizer solution Use 3 mL as needed      losartan (COZAAR) 100 MG tablet Take 1 tablet (100 mg total) by mouth daily 90 tablet 3    metFORMIN (GLUCOPHAGE-XR) 500 mg 24 hr tablet Take 2 tablets (1,000 mg total) by mouth daily 90 tablet 1    nitroglycerin (NITROSTAT) 0.4 mg SL tablet  (Patient not taking: Reported on 8/1/2023)      Ozempic, 0.25 or 0.5 MG/DOSE, 2 MG/1.5ML injection pen Inject 0.38 mL (0.5 mg total) under the skin every 7 days 4.5 mL 1    potassium chloride (K-DUR,KLOR-CON) 20 mEq tablet Take 1 tablet (20 mEq total) by mouth daily 90 tablet 1    Qvar RediHaler 40 MCG/ACT inhaler Inhale 2 puffs 2 (two) times a day      rosuvastatin (CRESTOR) 20 MG tablet Take 1 tablet (20 mg total) by mouth daily 90 tablet 1    SYNTHROID 75 MCG tablet Take 75 mcg by mouth daily in the early morning        venlafaxine (EFFEXOR-XR) 150 mg 24 hr capsule Take 1 capsule (150 mg total) by mouth every other day 45 capsule 1    venlafaxine (EFFEXOR-XR) 75 mg 24 hr capsule Take 1 capsule (75 mg total) by mouth every other day 45 capsule 1     No current facility-administered medications for this visit.         Allergies   Allergen Reactions    Epinephrine Shortness Of Breath and Palpitations    Carisoprodol Other (See Comments)     Other reaction(s): SOMA (11/04/2010)      Irbesartan-Hydrochlorothiazide Other (See Comments)     Possible lichen planus    Lisinopril      Lichen planus    Other Other (See Comments)     Other reaction(s): Muscle Relaxers (09/03/2014)      Tizanidine      Altered mental status       Review of Systems    Video Exam    There were no vitals filed for this visit.     Physical Exam

## 2023-11-25 DIAGNOSIS — E03.9 HYPOTHYROIDISM, UNSPECIFIED TYPE: Primary | ICD-10-CM

## 2023-11-25 RX ORDER — BUDESONIDE 0.5 MG/2ML
0.5 INHALANT ORAL DAILY PRN
Refills: 0 | Status: CANCELLED | OUTPATIENT
Start: 2023-11-25

## 2023-11-27 RX ORDER — LEVOTHYROXINE SODIUM 75 MCG
75 TABLET ORAL
Qty: 90 TABLET | Refills: 1 | Status: SHIPPED | OUTPATIENT
Start: 2023-11-27 | End: 2023-11-29

## 2023-11-28 ENCOUNTER — TELEPHONE (OUTPATIENT)
Dept: PSYCHIATRY | Facility: CLINIC | Age: 73
End: 2023-11-28

## 2023-11-28 NOTE — TELEPHONE ENCOUNTER
Patient called to schedule a f/u visit, patient missed her appt yesterday due to being in the hospital

## 2023-11-29 ENCOUNTER — TELEPHONE (OUTPATIENT)
Dept: FAMILY MEDICINE CLINIC | Facility: CLINIC | Age: 73
End: 2023-11-29

## 2023-11-29 ENCOUNTER — TELEPHONE (OUTPATIENT)
Dept: PSYCHIATRY | Facility: CLINIC | Age: 73
End: 2023-11-29

## 2023-11-29 DIAGNOSIS — E03.9 HYPOTHYROIDISM, UNSPECIFIED TYPE: Primary | ICD-10-CM

## 2023-11-29 LAB
ALBUMIN SERPL-MCNC: 4.6 G/DL (ref 3.8–4.8)
ALBUMIN/CREAT UR: 42 MG/G CREAT (ref 0–29)
ALBUMIN/GLOB SERPL: 1.9 {RATIO} (ref 1.2–2.2)
ALP SERPL-CCNC: 130 IU/L (ref 44–121)
ALT SERPL-CCNC: 36 IU/L (ref 0–32)
AST SERPL-CCNC: 42 IU/L (ref 0–40)
BILIRUB SERPL-MCNC: 0.8 MG/DL (ref 0–1.2)
BUN SERPL-MCNC: 10 MG/DL (ref 8–27)
BUN/CREAT SERPL: 12 (ref 12–28)
CALCIUM SERPL-MCNC: 9.8 MG/DL (ref 8.7–10.3)
CHLORIDE SERPL-SCNC: 94 MMOL/L (ref 96–106)
CHOLEST SERPL-MCNC: 154 MG/DL (ref 100–199)
CO2 SERPL-SCNC: 27 MMOL/L (ref 20–29)
CREAT SERPL-MCNC: 0.81 MG/DL (ref 0.57–1)
CREAT UR-MCNC: 106.2 MG/DL
EGFR: 76 ML/MIN/1.73
EST. AVERAGE GLUCOSE BLD GHB EST-MCNC: 169 MG/DL
GLOBULIN SER-MCNC: 2.4 G/DL (ref 1.5–4.5)
GLUCOSE SERPL-MCNC: 167 MG/DL (ref 70–99)
HBA1C MFR BLD: 7.5 % (ref 4.8–5.6)
HDLC SERPL-MCNC: 53 MG/DL
LDLC SERPL CALC-MCNC: 72 MG/DL (ref 0–99)
LDLC/HDLC SERPL: 1.4 RATIO (ref 0–3.2)
MICROALBUMIN UR-MCNC: 44.4 UG/ML
POTASSIUM SERPL-SCNC: 4.1 MMOL/L (ref 3.5–5.2)
PROT SERPL-MCNC: 7 G/DL (ref 6–8.5)
SL AMB T4, FREE (DIRECT): 1.18 NG/DL (ref 0.82–1.77)
SL AMB VLDL CHOLESTEROL CALC: 29 MG/DL (ref 5–40)
SODIUM SERPL-SCNC: 139 MMOL/L (ref 134–144)
TRIGL SERPL-MCNC: 174 MG/DL (ref 0–149)
TSH SERPL DL<=0.005 MIU/L-ACNC: 5.35 UIU/ML (ref 0.45–4.5)

## 2023-11-29 RX ORDER — LEVOTHYROXINE SODIUM 88 UG/1
88 TABLET ORAL DAILY
Qty: 90 TABLET | Refills: 1 | Status: SHIPPED | OUTPATIENT
Start: 2023-11-29 | End: 2023-12-07 | Stop reason: SDUPTHER

## 2023-11-29 NOTE — TELEPHONE ENCOUNTER
Left detailed message requesting a call back to schedule an appt with [employer]. Left VM on both numbers in chart. Left NEEL & Erin numbers.

## 2023-11-29 NOTE — TELEPHONE ENCOUNTER
----- Message from Jeet Schmid DO sent at 11/29/2023  8:32 AM EST -----  Can let patient know that her thyroid was a little underactive. I am going to increase her synthroid from 75 mcg to 88 mcg. Repeat thyroid labs in 8 weeks followed by appt to review results. normal

## 2023-12-04 ENCOUNTER — OFFICE VISIT (OUTPATIENT)
Dept: FAMILY MEDICINE CLINIC | Facility: CLINIC | Age: 73
End: 2023-12-04
Payer: COMMERCIAL

## 2023-12-04 VITALS
HEART RATE: 80 BPM | OXYGEN SATURATION: 97 % | DIASTOLIC BLOOD PRESSURE: 76 MMHG | SYSTOLIC BLOOD PRESSURE: 128 MMHG | WEIGHT: 152 LBS | TEMPERATURE: 97.8 F | HEIGHT: 64 IN | BODY MASS INDEX: 25.95 KG/M2

## 2023-12-04 DIAGNOSIS — R56.9 SEIZURE (HCC): ICD-10-CM

## 2023-12-04 DIAGNOSIS — E87.6 HYPOKALEMIA: ICD-10-CM

## 2023-12-04 DIAGNOSIS — F10.10 ALCOHOL ABUSE: ICD-10-CM

## 2023-12-04 DIAGNOSIS — E11.9 TYPE 2 DIABETES MELLITUS WITHOUT COMPLICATION, WITHOUT LONG-TERM CURRENT USE OF INSULIN (HCC): ICD-10-CM

## 2023-12-04 DIAGNOSIS — R41.82 ALTERED MENTAL STATUS, UNSPECIFIED ALTERED MENTAL STATUS TYPE: Primary | ICD-10-CM

## 2023-12-04 DIAGNOSIS — Z23 ENCOUNTER FOR IMMUNIZATION: ICD-10-CM

## 2023-12-04 DIAGNOSIS — E03.9 HYPOTHYROIDISM, UNSPECIFIED TYPE: ICD-10-CM

## 2023-12-04 DIAGNOSIS — I70.91 GENERALIZED ATHEROSCLEROSIS: ICD-10-CM

## 2023-12-04 DIAGNOSIS — E87.1 HYPONATREMIA: ICD-10-CM

## 2023-12-04 PROCEDURE — 90662 IIV NO PRSV INCREASED AG IM: CPT

## 2023-12-04 PROCEDURE — G0008 ADMIN INFLUENZA VIRUS VAC: HCPCS

## 2023-12-04 PROCEDURE — 99214 OFFICE O/P EST MOD 30 MIN: CPT | Performed by: FAMILY MEDICINE

## 2023-12-04 RX ORDER — BECLOMETHASONE DIPROPIONATE HFA 80 UG/1
AEROSOL, METERED RESPIRATORY (INHALATION)
COMMUNITY
Start: 2023-10-18

## 2023-12-04 NOTE — PATIENT INSTRUCTIONS
Resume taking synthroid when you get it [facility]. Go for repeat thyroid labs in 8 weeks after restarting meds. Get bmp to check potassium and sodium at the same time  Do not drive until you see neurology for evaluation. See cardiology as recommended.

## 2023-12-04 NOTE — PROGRESS NOTES
Name: Braxton Kaiser      : 1950      MRN: [id number]  Encounter Provider: Андрей Bee DO  Encounter Date: 2023   Encounter department: [address] [place] [address]     1. Altered mental status, unspecified altered mental status type  -     Ambulatory Referral to Cardiology; Future  -     Ambulatory Referral to Neurology; Future  Reviewed hospital discharge summary, neurology consultation, EEG, labs and echo from [facility] -23  No MRI report available for my review at time of today's visit. According to neurology note, patient presumed to have had seizure though no prior history of same. Patient to f/u with both neurology and cardiology as recommended. Nothing scheduled and patient requesting referrals to [facility] providers. 2. Encounter for immunization  -     influenza vaccine, high-dose, PF 0.7 mL (FLUZONE HIGH-DOSE)    3. Hypothyroidism, unspecified type  Lab Results   Component Value Date    TSH 5.350 (H) 2023     Her dose was increased when labs resulted. She had been off of her med for a week or so. Will repeat labs once she has been taking regularly for 8 weeks. Call with results. 4. Type 2 diabetes mellitus without complication, without long-term current use of insulin ([facility])  A1c in hospital elevated at 7.8    5. Generalized atherosclerosis  -     Ambulatory Referral to Cardiology; Future  Seen on her CTA. Patient on crestor. 6. Hypokalemia  -     Basic metabolic panel; Future  -     Basic metabolic panel  K was low at 3.3 at time of admission to [facility]  Will repeat this with her thyroid labs in 8 weeks. 7. Hyponatremia  -     Basic metabolic panel; Future  -     Basic metabolic panel    8. Alcohol abuse  Patient assures me that she has not been drinking. 9. Seizure [facility])  -     Ambulatory Referral to Neurology;  Future  Per neurology note though no prior history of seizure and EEG done during admission was normal.   Advised to avoid driving per neurology recommendation  Refer to [facility] neurology as she does not want to travel to [place] to see [place] neurologist.        Subjective     HPI  Patient is a 75year old female with multiple chronic medical problems as noted below, who is being seen today for hospital f/u visit. Patient Active Problem List   Diagnosis   • GERD (gastroesophageal reflux disease)   • Rectal bleeding   • Abnormal levels of other serum enzymes   • Cough variant asthma   • Current use of proton pump inhibitor   • Deformity of right foot   • Dyspnea   • Essential hypertension   • Generalized atherosclerosis   • Hyperlipidemia   • Hypothyroid   • Lichen planus   • Major depression, recurrent (HCC)   • NAFLD (nonalcoholic fatty liver disease)   • Osteopenia   • Sarcoidosis   • Solitary pulmonary nodule   • Type 2 diabetes mellitus without complication (HCC)   • Vitamin D deficiency   • Hypogammaglobulinemia (HCC)   • Rupture of extensor tendon of foot   • Alcohol abuse   • Closed nondisplaced fracture of proximal phalanx of right great toe   • Adjustment disorder with mixed anxiety and depressed mood         She was hospitalized at [facility] from 11/21/23-11/22/23 for altered mental status. According to neurologist's consultation patient was confused asking inappropriate questions so granddaughter called 911. Became combative and agitated enroute to hospital and was given "ativan and multiple doses of anti-psychotics"    CTA of head and neck done 11/21/23 showed no evidence of large vessel occlusion, critical stenosis or aneurysm. Calcified plaques in carotid siphons bilaterally causing 50% stenosis. Short semgent 60% stenosis in M1 segment of left middle cerebral artery. CXR completed 11/21/23 showed ill defined opacity right lower lobe likely of infectious etiology. CBC showed hemoglobin of 15.8, platelets of 028. Her BUN and creatinine were normal at 19/0. 81.  Her glucose was elevated at 217. A1c was 7.8. urine tox negative for alcohol, acetaminophen, salicylates. UA negative. Her sodium was low at 134 and K was low at 3.3. Echo [id number]/23 showed no obvious cardiac source of embolus, intracardiac thrombus, shunting. Normal LV systolic function without distinct regional wall motion abnormalities. Neurology ordered MRI. No copy of report for my review. She was seen in consultation by neurology and cardiology. I reviewed neurology consultation letter. There is no cardiology note for review. Neurology felt that patient did not have a stroke but rather had seizure. Was advised to avoid driving. Had EEG done on 11/22/23 which showed no apparent epileptiform feature. According to discharge summary, patient not to drive until she sees neurology in f/u. Does not recall being told not to drive. She drove here today. Was advised to f/u with both cardiology and neurology as outpatient. she does not have anything scheduled with neurology because they wanted her to see someone in [facility] and she does not want to go to [place]. Wants referral to [facility] neurology and cardiology    She did call her pulmonologist who reviewed the CXR and was told it was normal.     Patient does not recall much about the day at all. Does recall visiting with her son earlier in the day and recalls having sensation that her face was numb on one side. That's the last thing she recalls until in hospital.     Since the hospitalization, has felt fine other than being generally weak for a few days. Some intermittent coughing. No headaches since discharge and no chest pain, palpitations or shortness of breath. Some constipation. No fever. No further facial numbness. No arm or leg numbness or weakness. Sent her levothyroxine on 11/29. She has not received from [facility].      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 Albin Ortega. recall 5 years     Past Surgical History:   Procedure Laterality Date   • BREAST BIOPSY Right     benign   • CARDIAC CATHETERIZATION     • CATARACT EXTRACTION, BILATERAL     • COLONOSCOPY     • CORNEA LACERATION REPAIR Right    • REFRACTIVE SURGERY     • TONSILLECTOMY     • UPPER GASTROINTESTINAL ENDOSCOPY     • WISDOM TOOTH EXTRACTION       Family History   Problem Relation Age of Onset   • Heart disease Mother    • Heart disease Father    • Stroke Sister    • Heart disease Sister    • Lung cancer Sister 72   • No Known Problems Daughter    • No Known Problems Maternal Grandmother    • No Known Problems Maternal Grandfather    • No Known Problems Paternal Grandmother    • No Known Problems Paternal Grandfather    • Heart disease Brother    • No Known Problems Maternal Aunt    • No Known Problems Maternal Aunt    • No Known Problems Maternal Aunt    • No Known Problems Maternal Aunt    • No Known Problems Paternal Aunt    • No Known Problems Paternal Aunt    • Colon polyps Neg Hx    • Colon cancer Neg Hx      Social History     Socioeconomic History   • Marital status:       Spouse name: None   • Number of children: None   • Years of education: None   • Highest education level: None   Occupational History   • None   Tobacco Use   • Smoking status: Former     Packs/day: 0.25     Years: 25.00     Total pack years: 6.25     Types: Cigarettes     Start date: 64     Quit date:      Years since quittin.9   • Smokeless tobacco: Never   Vaping Use   • Vaping Use: Never used   Substance and Sexual Activity   • Alcohol use: Yes     Alcohol/week: 14.0 standard drinks of alcohol     Types: 14 Glasses of wine per week   • Drug use: Not Currently   • Sexual activity: Not Currently   Other Topics Concern   • None   Social History Narrative        Lives in in-law suite with son and his family    Used to work for [employer] as a     Likes to [place] and read     Social Determinants of Health     Financial Resource Strain: Low Risk  (2023)    Overall Financial Resource Strain (CARDIA)    • Difficulty of Paying Living Expenses: Not hard at all   Food Insecurity: Not on file   Transportation Needs: No Transportation Needs (2023)    PRAPARE - Transportation    • Lack of Transportation (Medical): No    • Lack of Transportation (Non-Medical): No   Physical Activity: Not on file   Stress: Not on file   Social Connections: Not on file   Intimate Partner Violence: Not on file   Housing Stability: Not on file     Current Outpatient Medications on File Prior to Visit   Medication Sig   • albuterol (PROVENTIL HFA,VENTOLIN HFA) 90 mcg/act inhaler Inhale 2 puffs 4 (four) times a day   • amLODIPine (NORVASC) 10 mg tablet Take 1 tablet (10 mg total) by mouth daily   • ascorbic acid (VITAMIN C) 500 mg tablet Take 500 mg by mouth daily   • Biotin 5000 MCG CAPS Take 5,000 mcg by mouth in the morning   • budesonide (PULMICORT) 0.5 mg/2 mL nebulizer solution Take 0.5 mg by nebulization as needed in the morning. Rinse mouth after use.  .   • calcium carbonate (OS-BRITTA) 600 MG tablet Take 600 mg by mouth 2 (two) times a day with meals   • cholecalciferol (VITAMIN D3) 1,000 units tablet Take 1,000 Units by mouth daily   • dexlansoprazole (DEXILANT) 60 MG capsule TAKE 1  BY MOUTH ONCE DAILY   • GUAIFENESIN 1200 PO Take 1,500 mg by mouth 2 (two) times a day   • hydrochlorothiazide (HYDRODIURIL) 25 mg tablet Take 1 tablet (25 mg total) by mouth daily   • ipratropium-albuterol, FOR EMS ONLY, (DUO-NEB) 0.5-2.5 mg/3 mL nebulizer solution Use 3 mL as needed   • levothyroxine (Synthroid) 88 mcg tablet Take 1 tablet (88 mcg total) by mouth daily   • losartan (COZAAR) 100 MG tablet Take 1 tablet (100 mg total) by mouth daily   • metFORMIN (GLUCOPHAGE-XR) 500 mg 24 hr tablet Take 2 tablets (1,000 mg total) by mouth daily   • Ozempic, 0.25 or 0.5 MG/DOSE, 2 MG/1.5ML injection pen Inject 0.38 mL (0.5 mg total) under the skin every 7 days   • potassium chloride (K-DUR,KLOR-CON) 20 mEq tablet Take 1 tablet (20 mEq total) by mouth daily   • Qvar RediHaler 40 MCG/ACT inhaler Inhale 2 puffs 2 (two) times a day   • rosuvastatin (CRESTOR) 20 MG tablet Take 1 tablet (20 mg total) by mouth daily   • venlafaxine (EFFEXOR-XR) 150 mg 24 hr capsule Take 1 capsule (150 mg total) by mouth every other day   • venlafaxine (EFFEXOR-XR) 75 mg 24 hr capsule Take 1 capsule (75 mg total) by mouth every other day   • calcium polycarbophil (FIBERCON) 625 mg tablet Take 1 tablet (625 mg total) by mouth daily (Patient not taking: Reported on 12/4/2023)   • nitroglycerin (NITROSTAT) 0.4 mg SL tablet  (Patient not taking: Reported on 8/1/2023)   • Qvar RediHaler 80 MCG/ACT inhaler  (Patient not taking: Reported on 12/4/2023)     Allergies   Allergen Reactions   • Epinephrine Shortness Of Breath and Palpitations   • Carisoprodol Other (See Comments)     Other reaction(s): SOMA (11/04/2010)     • Irbesartan-Hydrochlorothiazide Other (See Comments)     Possible lichen planus   • Lisinopril      Lichen planus   • Other Other (See Comments)     Other reaction(s): Muscle Relaxers (09/03/2014)     • Tizanidine      Altered mental status     Immunization History   Administered Date(s) Administered   • Hep A, adult 03/02/2005   • INFLUENZA 02/09/2010, 10/14/2011, 09/28/2016, 10/11/2017   • Influenza Split High Dose Preservative Free IM 11/20/2019   • Influenza, high dose seasonal 0.7 mL 11/17/2022, 12/04/2023   • Pneumococcal Conjugate Vaccine 20-valent (Pcv20), Polysace 05/17/2022   • Pneumococcal Polysaccharide PPV23 01/10/2006, 01/01/2014   • Td (adult), adsorbed 03/08/2002, 10/13/2003   • Tdap 10/06/2010       Objective     /76 (BP Location: Left arm, Patient Position: Sitting, Cuff Size: Standard)   Pulse 80   Temp 97.8 °F (36.6 °C) (Tympanic)   Ht 5' 4" (1.626 m)   Wt 68.9 kg (152 lb)   LMP  (LMP Unknown)   SpO2 97%   BMI 26.09 kg/m²     Physical Exam  Vitals and nursing note reviewed. Constitutional:       General: She is not in acute distress. Appearance: Normal appearance. She is not ill-appearing, toxic-appearing or diaphoretic. HENT:      Head: Normocephalic and atraumatic. Eyes:      Extraocular Movements: Extraocular movements intact. Conjunctiva/sclera: Conjunctivae normal.      Pupils: Pupils are equal, round, and reactive to light. Neck:      Vascular: No carotid bruit. Cardiovascular:      Rate and Rhythm: Normal rate and regular rhythm. Heart sounds: No murmur heard. Pulmonary:      Effort: Pulmonary effort is normal.      Breath sounds: Normal breath sounds. Abdominal:      General: Abdomen is flat. Bowel sounds are normal.      Palpations: Abdomen is soft. Musculoskeletal:      Cervical back: Normal range of motion and neck supple. Right lower leg: No edema. Left lower leg: No edema. Lymphadenopathy:      Cervical: No cervical adenopathy. Skin:     General: Skin is warm and dry. Findings: No rash. Neurological:      General: No focal deficit present. Mental Status: She is alert and oriented to person, place, and time. Cranial Nerves: No cranial nerve deficit.       Sensory: No sensory deficit. Motor: No weakness.       Gait: Gait abnormal.      Deep Tendon Reflexes: Reflexes normal.      Comments: Difficulty with heel to toe walking   Psychiatric:         Mood and Affect: Mood normal.   Shailesh Roberts DO

## 2023-12-07 DIAGNOSIS — J45.991 COUGH VARIANT ASTHMA: Primary | ICD-10-CM

## 2023-12-07 DIAGNOSIS — E87.6 HYPOKALEMIA: ICD-10-CM

## 2023-12-07 DIAGNOSIS — E03.9 HYPOTHYROIDISM, UNSPECIFIED TYPE: ICD-10-CM

## 2023-12-08 DIAGNOSIS — K21.9 GASTROESOPHAGEAL REFLUX DISEASE, UNSPECIFIED WHETHER ESOPHAGITIS PRESENT: ICD-10-CM

## 2023-12-08 DIAGNOSIS — E11.9 TYPE 2 DIABETES MELLITUS WITHOUT COMPLICATION, WITHOUT LONG-TERM CURRENT USE OF INSULIN (HCC): ICD-10-CM

## 2023-12-08 RX ORDER — POTASSIUM CHLORIDE 20 MEQ/1
20 TABLET, EXTENDED RELEASE ORAL DAILY
Qty: 90 TABLET | Refills: 0 | Status: SHIPPED | OUTPATIENT
Start: 2023-12-08

## 2023-12-08 RX ORDER — METFORMIN HYDROCHLORIDE 500 MG/1
1000 TABLET, EXTENDED RELEASE ORAL DAILY
Qty: 90 TABLET | Refills: 7 | Status: SHIPPED | OUTPATIENT
Start: 2023-12-08

## 2023-12-08 RX ORDER — DEXLANSOPRAZOLE 60 MG/1
CAPSULE, DELAYED RELEASE ORAL
Qty: 90 CAPSULE | Refills: 0 | Status: SHIPPED | OUTPATIENT
Start: 2023-12-08

## 2023-12-08 RX ORDER — LEVOTHYROXINE SODIUM 88 UG/1
88 TABLET ORAL DAILY
Qty: 90 TABLET | Refills: 0 | Status: SHIPPED | OUTPATIENT
Start: 2023-12-08

## 2023-12-08 NOTE — TELEPHONE ENCOUNTER
GUAIFENESIN 1200 PO Take 1,500 mg by mouth 2 (two) times a day     levothyroxine (Synthroid) 88 mcg tablet Take 1 tablet (88 mcg total) by mouth daily     potassium chloride (K-DUR,KLOR-CON) 20 mEq tablet Take 1 tablet (20 mEq total) by mouth daily       Not sure if these where approved DR Olivier Villegas on my end they are showing not yet filled or approved

## 2023-12-25 DIAGNOSIS — E11.9 TYPE 2 DIABETES MELLITUS WITHOUT COMPLICATION, WITHOUT LONG-TERM CURRENT USE OF INSULIN (HCC): ICD-10-CM

## 2023-12-27 RX ORDER — SEMAGLUTIDE 0.68 MG/ML
INJECTION, SOLUTION SUBCUTANEOUS
Qty: 9 ML | Refills: 3 | Status: SHIPPED | OUTPATIENT
Start: 2023-12-27

## 2024-03-25 DIAGNOSIS — K21.9 GASTROESOPHAGEAL REFLUX DISEASE, UNSPECIFIED WHETHER ESOPHAGITIS PRESENT: ICD-10-CM

## 2024-03-25 LAB
LEFT EYE DIABETIC RETINOPATHY: NORMAL
RIGHT EYE DIABETIC RETINOPATHY: NORMAL

## 2024-03-25 RX ORDER — DEXLANSOPRAZOLE 60 MG/1
CAPSULE, DELAYED RELEASE ORAL
Qty: 90 CAPSULE | Refills: 0 | Status: SHIPPED | OUTPATIENT
Start: 2024-03-25

## 2024-04-22 DIAGNOSIS — E87.6 HYPOKALEMIA: ICD-10-CM

## 2024-04-22 RX ORDER — POTASSIUM CHLORIDE 1500 MG/1
20 TABLET, EXTENDED RELEASE ORAL DAILY
Qty: 90 TABLET | Refills: 3 | Status: SHIPPED | OUTPATIENT
Start: 2024-04-22

## 2024-05-10 PROBLEM — R56.9 SEIZURE (HCC): Status: ACTIVE | Noted: 2024-05-10

## 2024-05-10 NOTE — PROGRESS NOTES
"Name: Viv Corona      : 1950      MRN: [id number]  Encounter Provider: Jemma Dailey DO  Encounter Date: 2024   Encounter department: [facility] PRIMARY CARE    Assessment & Plan     1. Seizure (HCC)  Patient hospitalized at [facility] in December for altered mental status.   EEG negative. MRI of brain was normal.   Saw neurology in consultation 2024 (Dr Calloway at [facility]) who noted that   \"Etiology of her episode is unclear at this moment: Seizure vs. TIA vs. Worsening psychiatric issues vs. Metabolic encephalopathy due Hyponatremia etc..\"  She has been seizure free since then and denies any episodes of altered mental status since that time.   She was advised to f/u with cardiology at time of her hospital discharge. She never saw cardiology as recommended and is requesting referral today.   2. Essential hypertension  -     Comprehensive metabolic panel  -     Ambulatory Referral to Cardiology; Future  Bp at goal on current meds  3. Hypothyroidism, unspecified type  -     TSH, 3rd generation with Free T4 reflex  Lab Results   Component Value Date    TSH 5.350 (H) 2023   Was off of her meds at time of this labwork. I had given her refill at her December visit. She was advised to have repeat labs 8 weeks after resuming meds but has not gone. I reordered her labs today.   4. Type 2 diabetes mellitus without complication, without long-term current use of insulin (HCC)  -     Comprehensive metabolic panel  -     Lipid Panel with Direct LDL reflex  -     Hemoglobin A1C  -     Ambulatory Referral to Cardiology; Future  Lab Results   Component Value Date    HGBA1C 7.5 (H) 2023     Due for labs. Orders placed today.   On metformin and ozempic.   5. Alcohol abuse  Abstaining from alcohol with exception of special occasions.   6. Recurrent major depressive disorder, remission status unspecified (HCC)  Stable on effexor.   7. Hyperlipidemia, unspecified hyperlipidemia type  -   " "  Lipid Panel with Direct LDL reflex  Stable on crestor.   Due for labs.   8. Hyponatremia  -     Comprehensive metabolic panel    9. Hypokalemia  -     Comprehensive metabolic panel    10. Encounter for screening mammogram for malignant neoplasm of breast  -     Mammo screening bilateral w 3d & cad; Future    11. Hypogammaglobulinemia (HCC)    12. Post-menopausal  -     DXA bone density spine hip and pelvis; Future; Expected date: 05/13/2024    13. Rib pain on left side  Started after a fall. She does have some tenderness. Recommended xray to rule out fracture but she declines today.   14. Right hip pain.   Only while walking. No injury to hip.   Refer PT.   15. Altered mental status, unspecified altered mental status type  -     Ambulatory Referral to Cardiology; Future  Hospitalized for this back in December. No further episodes of AMS    16. History of coronary vasospasm  -     Ambulatory Referral to Cardiology; Future           Subjective     HPIPatient is a 72 year old female with hypertension, hyperlipidemia, DM2, hypothyroidism, GERD, NAFLD, depression, sarcoidosis, vitamin d deficiency, osteopenia and atherosclerosis who is being seen today for f/u visit.     Viv was last seen here in this office in December for f/u from an an admission to [facility] where she was admitted for altered mental status. Brain MRI during hospitalization showed no evidence of stroke. Neurology recommended an EEG, which yielded unremarkable results. Blood tests revealed hyponatremia and hypokalemia She was seen in consultation by neurology and it was presumed that she had seizure though no prior history of same. She saw neurology in F/U at [facility] in [place] (Dr Calloway) on 1/16/24. Per Dr Calloway's note, \"Etiology of her episode is unclear at this moment: Seizure vs. TIA vs. Worsening psychiatric issues vs. Metabolic encephalopathy due Hyponatremia etc..\". advised that she could resume driving if seizure free for 6 months. Was advised to " f/u with neurology in 3 months. Has not returned. She is requesting that I complete her DMV forms noting that she may resume driving. She does have appt this week but states that she wants to reschedule due to a . She has not had any further episodes of seizure/altered mental status.     No further issues of altered mental status. No dizziness, headaches, chest pain, palpitations or shortness of breath. Never saw cardiology as recommended at time of her hospital discharge. Tried to call cardiology for appt and she was told that she does not need to see them?     She states that she has not been drinking alcohol on regular basis. Only if she goes out to dinner.     She states that she had a fall at home 3 weeks ago. States that she tripped on her slippers and fell, landing on her left side. Ribs are still hurting. Some pain with deep inspiration.     Also notes that she eard a snap last week while putting her pants on and then developed some pain  soon afterwards . Pain is only when walking    Patient Active Problem List   Diagnosis   • GERD (gastroesophageal reflux disease)--patient taking dexilant 60 mg once daily and has not had any reflux    • Rectal bleeding   • Abnormal levels of other serum enzymes   • Cough variant asthma--follows with pulmonology, Chad Rojas MD. On Qvar and prn albuterol/budesonide via nebulizer. h   • Current use of proton pump inhibitor   • Deformity of right foot   • Dyspnea   • Essential hypertension--on losartan 100, amlodipine 10 and hctz 12.5 mg daily.    • Generalized atherosclerosis   • Hyperlipidemia--on crestor 20 and tolerating well.    • Hypothyroid--TSH was elevated at time of her last visit but she had been off of her meds for a few weeks. Was advised to resume medication and go back for labs. Never went.    • Lichen planus   • Major depression, recurrent (HCC)   • NAFLD (nonalcoholic fatty liver disease)   • Osteopenia   • Sarcoidosis   • Solitary pulmonary  nodule   • Type 2 diabetes mellitus without complication (HCC)   • Vitamin D deficiency   • Hypogammaglobulinemia (HCC)   • Rupture of extensor tendon of foot   • Alcohol abuse   • Closed nondisplaced fracture of proximal phalanx of right great toe   • Seizure (HCC)            Review of Systems    Past Medical History:   Diagnosis Date   • Anxiety 03/29/2021   • Cataract 03/29/2021   • cough variant asthma    • COVID-19 03/06/2021    had again 3/29/22 (+antigen test)   • Diabetes mellitus (HCC)    • Disease of thyroid gland    • Diverticulosis     colonoscopy 7/31/20   • Fatty liver disease, nonalcoholic    • GERD (gastroesophageal reflux disease)     Last EGD 2016   • 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 bland. recall 5 years     Past Surgical History:   Procedure Laterality Date   • BREAST BIOPSY Right     benign   • CARDIAC CATHETERIZATION     • CATARACT EXTRACTION, BILATERAL     • COLONOSCOPY     • CORNEA LACERATION REPAIR Right    • REFRACTIVE SURGERY     • TONSILLECTOMY     • UPPER GASTROINTESTINAL ENDOSCOPY     • WISDOM TOOTH EXTRACTION       Family History   Problem Relation Age of Onset   • Heart disease Mother    • Heart disease Father    • Stroke Sister    • Heart disease Sister    • Lung cancer Sister 65   • No Known Problems Daughter    • No Known Problems Maternal Grandmother    • No Known Problems Maternal Grandfather    • No Known Problems Paternal Grandmother    • No Known Problems Paternal Grandfather    • Heart disease Brother    • No Known Problems Maternal Aunt    • No Known Problems Maternal Aunt    • No Known Problems  Maternal Aunt    • No Known Problems Maternal Aunt    • No Known Problems Paternal Aunt    • No Known Problems Paternal Aunt    • Colon polyps Neg Hx    • Colon cancer Neg Hx      Social History     Socioeconomic History   • Marital status:      Spouse name: None   • Number of children: None   • Years of education: None   • Highest education level: None   Occupational History   • None   Tobacco Use   • Smoking status: Former     Current packs/day: 0.00     Average packs/day: 0.3 packs/day for 25.0 years (6.3 ttl pk-yrs)     Types: Cigarettes     Start date:      Quit date:      Years since quittin.3   • Smokeless tobacco: Never   Vaping Use   • Vaping status: Never Used   Substance and Sexual Activity   • Alcohol use: Yes     Alcohol/week: 14.0 standard drinks of alcohol     Types: 14 Glasses of wine per week   • Drug use: Not Currently   • Sexual activity: Not Currently   Other Topics Concern   • None   Social History Narrative        Lives in in-law suite with son and his family    Used to work for [employer] as a     Likes to cook and read     Social Determinants of Health     Financial Resource Strain: Low Risk  (2023)    Overall Financial Resource Strain (CARDIA)    • Difficulty of Paying Living Expenses: Not hard at all   Food Insecurity: Not on file   Transportation Needs: No Transportation Needs (2023)    PRAPARE - Transportation    • Lack of Transportation (Medical): No    • Lack of Transportation (Non-Medical): No   Physical Activity: Not on file   Stress: Not on file   Social Connections: Not on file   Intimate Partner Violence: Not on file   Housing Stability: Not on file     Current Outpatient Medications on File Prior to Visit   Medication Sig   • albuterol (PROVENTIL HFA,VENTOLIN HFA) 90 mcg/act inhaler Inhale 2 puffs 4 (four) times a day   • amLODIPine (NORVASC) 10 mg tablet Take 1 tablet (10 mg total) by mouth daily   • ascorbic acid  (VITAMIN C) 500 mg tablet Take 500 mg by mouth daily   • Biotin 5000 MCG CAPS Take 5,000 mcg by mouth in the morning   • budesonide (PULMICORT) 0.5 mg/2 mL nebulizer solution Take 0.5 mg by nebulization as needed in the morning. Rinse mouth after use. .   • calcium carbonate (OS-BRITTA) 600 MG tablet Take 600 mg by mouth 2 (two) times a day with meals   • cholecalciferol (VITAMIN D3) 1,000 units tablet Take 1,000 Units by mouth daily   • dexlansoprazole (DEXILANT) 60 MG capsule TAKE 1  BY MOUTH ONCE DAILY   • hydrochlorothiazide (HYDRODIURIL) 25 mg tablet Take 1 tablet (25 mg total) by mouth daily   • ipratropium-albuterol, FOR EMS ONLY, (DUO-NEB) 0.5-2.5 mg/3 mL nebulizer solution Use 3 mL as needed   • Klor-Con M20 20 MEQ tablet TAKE 1 TABLET DAILY   • levothyroxine (Synthroid) 88 mcg tablet Take 1 tablet (88 mcg total) by mouth daily   • losartan (COZAAR) 100 MG tablet Take 1 tablet (100 mg total) by mouth daily   • metFORMIN (GLUCOPHAGE-XR) 500 mg 24 hr tablet TAKE 2 TABLETS DAILY   • Ozempic, 0.25 or 0.5 MG/DOSE, 2 MG/3ML injection pen INJECT 0.5 MG UNDER THE SKIN EVERY 7 DAYS   • Qvar RediHaler 40 MCG/ACT inhaler Inhale 2 puffs 2 (two) times a day   • rosuvastatin (CRESTOR) 20 MG tablet Take 1 tablet (20 mg total) by mouth daily   • venlafaxine (EFFEXOR-XR) 150 mg 24 hr capsule Take 1 capsule (150 mg total) by mouth every other day   • venlafaxine (EFFEXOR-XR) 75 mg 24 hr capsule Take 1 capsule (75 mg total) by mouth every other day   • GUAIFENESIN 1200 PO Take 1,500 mg by mouth 2 (two) times a day   • [DISCONTINUED] calcium polycarbophil (FIBERCON) 625 mg tablet Take 1 tablet (625 mg total) by mouth daily (Patient not taking: Reported on 5/13/2024)   • [DISCONTINUED] nitroglycerin (NITROSTAT) 0.4 mg SL tablet  (Patient not taking: Reported on 8/1/2023)   • [DISCONTINUED] Qvar RediHaler 80 MCG/ACT inhaler  (Patient not taking: Reported on 12/4/2023)     Allergies   Allergen Reactions   • Epinephrine Shortness Of  "Breath and Palpitations   • Carisoprodol Other (See Comments)     Other reaction(s): SOMA (11/04/2010)     • Irbesartan-Hydrochlorothiazide Other (See Comments)     Possible lichen planus   • Lisinopril      Lichen planus   • Other Other (See Comments)     Other reaction(s): Muscle Relaxers (09/03/2014)     • Tizanidine      Altered mental status     Immunization History   Administered Date(s) Administered   • Hep A, adult 03/02/2005   • INFLUENZA 02/09/2010, 10/14/2011, 09/28/2016, 10/11/2017   • Influenza Split High Dose Preservative Free IM 11/20/2019   • Influenza, high dose seasonal 0.7 mL 11/17/2022, 12/04/2023   • Pneumococcal Conjugate Vaccine 20-valent (Pcv20), Polysace 05/17/2022   • Pneumococcal Polysaccharide PPV23 01/10/2006, 01/01/2014   • Td (adult), adsorbed 03/08/2002, 10/13/2003   • Tdap 10/06/2010, 07/13/2021       Objective     /64 (BP Location: Left arm, Patient Position: Sitting, Cuff Size: Adult)   Pulse 94   Temp 98.7 °F (37.1 °C) (Tympanic)   Ht 5' 4\" (1.626 m)   Wt 69.4 kg (153 lb)   LMP  (LMP Unknown)   SpO2 96%   BMI 26.26 kg/m²     Physical Exam  Vitals and nursing note reviewed.   Constitutional:       General: She is not in acute distress.     Appearance: Normal appearance. She is not ill-appearing, toxic-appearing or diaphoretic.   Eyes:      Conjunctiva/sclera: Conjunctivae normal.   Cardiovascular:      Rate and Rhythm: Normal rate and regular rhythm.      Heart sounds: No murmur heard.  Pulmonary:      Effort: Pulmonary effort is normal.      Breath sounds: Normal breath sounds.   Abdominal:      General: Abdomen is flat. Bowel sounds are normal.      Palpations: Abdomen is soft.   Musculoskeletal:      Cervical back: Normal range of motion and neck supple.      Right lower leg: No edema.      Left lower leg: No edema.      Comments: + tender mid axillary line over ribs. No ecchymosis. No rash.   She has full range of motion of right hip. Tenderness ASIS on right. No " tenderness lateral epicondyle.      Lymphadenopathy:      Cervical: No cervical adenopathy.   Skin:     General: Skin is warm and dry.      Findings: No rash.   Neurological:      General: No focal deficit present.      Mental Status: She is alert and oriented to person, place, and time.      Deep Tendon Reflexes: Reflexes normal.   Psychiatric:         Mood and Affect: Mood normal.   Jemma Dailey, DO

## 2024-05-13 ENCOUNTER — OFFICE VISIT (OUTPATIENT)
Dept: FAMILY MEDICINE CLINIC | Facility: CLINIC | Age: 74
End: 2024-05-13
Payer: COMMERCIAL

## 2024-05-13 VITALS
HEART RATE: 94 BPM | HEIGHT: 64 IN | BODY MASS INDEX: 26.12 KG/M2 | DIASTOLIC BLOOD PRESSURE: 64 MMHG | TEMPERATURE: 98.7 F | OXYGEN SATURATION: 96 % | SYSTOLIC BLOOD PRESSURE: 132 MMHG | WEIGHT: 153 LBS

## 2024-05-13 DIAGNOSIS — M25.551 RIGHT HIP PAIN: ICD-10-CM

## 2024-05-13 DIAGNOSIS — D80.1 HYPOGAMMAGLOBULINEMIA (HCC): ICD-10-CM

## 2024-05-13 DIAGNOSIS — F10.10 ALCOHOL ABUSE: ICD-10-CM

## 2024-05-13 DIAGNOSIS — E87.6 HYPOKALEMIA: ICD-10-CM

## 2024-05-13 DIAGNOSIS — R41.82 ALTERED MENTAL STATUS, UNSPECIFIED ALTERED MENTAL STATUS TYPE: ICD-10-CM

## 2024-05-13 DIAGNOSIS — I10 ESSENTIAL HYPERTENSION: ICD-10-CM

## 2024-05-13 DIAGNOSIS — R56.9 SEIZURE (HCC): Primary | ICD-10-CM

## 2024-05-13 DIAGNOSIS — E03.9 HYPOTHYROIDISM, UNSPECIFIED TYPE: ICD-10-CM

## 2024-05-13 DIAGNOSIS — E87.1 HYPONATREMIA: ICD-10-CM

## 2024-05-13 DIAGNOSIS — Z78.0 POST-MENOPAUSAL: ICD-10-CM

## 2024-05-13 DIAGNOSIS — E11.9 TYPE 2 DIABETES MELLITUS WITHOUT COMPLICATION, WITHOUT LONG-TERM CURRENT USE OF INSULIN (HCC): ICD-10-CM

## 2024-05-13 DIAGNOSIS — R07.81 RIB PAIN ON LEFT SIDE: ICD-10-CM

## 2024-05-13 DIAGNOSIS — Z86.79 HISTORY OF CORONARY VASOSPASM: ICD-10-CM

## 2024-05-13 DIAGNOSIS — F33.9 RECURRENT MAJOR DEPRESSIVE DISORDER, REMISSION STATUS UNSPECIFIED (HCC): ICD-10-CM

## 2024-05-13 DIAGNOSIS — E78.5 HYPERLIPIDEMIA, UNSPECIFIED HYPERLIPIDEMIA TYPE: ICD-10-CM

## 2024-05-13 DIAGNOSIS — Z12.31 ENCOUNTER FOR SCREENING MAMMOGRAM FOR MALIGNANT NEOPLASM OF BREAST: ICD-10-CM

## 2024-05-13 PROCEDURE — G2211 COMPLEX E/M VISIT ADD ON: HCPCS | Performed by: FAMILY MEDICINE

## 2024-05-13 PROCEDURE — 99214 OFFICE O/P EST MOD 30 MIN: CPT | Performed by: FAMILY MEDICINE

## 2024-05-20 ENCOUNTER — TELEPHONE (OUTPATIENT)
Dept: ADMINISTRATIVE | Facility: OTHER | Age: 74
End: 2024-05-20

## 2024-05-20 NOTE — TELEPHONE ENCOUNTER
05/20/24 2:54 PM    Patient contacted (left message) to bring Advance Directive, POLST, or Living Will document to next scheduled pcp visit.    Thank you.  Anna Dennis MA  [facility]

## 2024-05-21 ENCOUNTER — OFFICE VISIT (OUTPATIENT)
Dept: FAMILY MEDICINE CLINIC | Facility: CLINIC | Age: 74
End: 2024-05-21
Payer: COMMERCIAL

## 2024-05-21 VITALS
TEMPERATURE: 97.9 F | SYSTOLIC BLOOD PRESSURE: 140 MMHG | HEART RATE: 84 BPM | OXYGEN SATURATION: 96 % | DIASTOLIC BLOOD PRESSURE: 70 MMHG | WEIGHT: 153 LBS | HEIGHT: 64 IN | BODY MASS INDEX: 26.12 KG/M2

## 2024-05-21 DIAGNOSIS — M25.562 ACUTE PAIN OF BOTH KNEES: ICD-10-CM

## 2024-05-21 DIAGNOSIS — Z00.00 MEDICARE ANNUAL WELLNESS VISIT, SUBSEQUENT: Primary | ICD-10-CM
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
## 2024-05-21 DIAGNOSIS — S20.211A CONTUSION OF RIB, RIGHT, INITIAL ENCOUNTER: ICD-10-CM

## 2024-05-21 DIAGNOSIS — M25.561 ACUTE PAIN OF BOTH KNEES: ICD-10-CM
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
## 2024-05-21 PROCEDURE — G0439 PPPS, SUBSEQ VISIT: HCPCS | Performed by: FAMILY MEDICINE

## 2024-05-21 PROCEDURE — 99213 OFFICE O/P EST LOW 20 MIN: CPT | Performed by: FAMILY MEDICINE

## 2024-05-21 NOTE — PROGRESS NOTES
Ambulatory Visit  Name: Viv Corona      : 1950      MRN: [id number]  Encounter Provider: Jemma Dailey DO  Encounter Date: 2024   Encounter department: [facility] PRIMARY CARE    Assessment & Plan   1. Medicare annual wellness visit, subsequent  2. Fall in home, initial encounter  -     XR ribs right w pa chest min 3 views; Future; Expected date: 2024  -     XR knee 4+ vw left injury; Future; Expected date: 2024  -     XR knee 4+ vw right injury; Future; Expected date: 2024  3. Acute pain of both knees  -     XR knee 4+ vw left injury; Future; Expected date: 2024  -     XR knee 4+ vw right injury; Future; Expected date: 2024  Contusion of bilateral knees.   Check xray. Ice, tylenol for pain.   4. Contusion of rib, right, initial encounter  -     XR ribs right w pa chest min 3 views; Future; Expected date: 2024    Check rib xray   Recommend deep breathing   Ice to chest wall.   Preventive health issues were discussed with patient, and age appropriate screening tests were ordered as noted in patient's After Visit Summary. Personalized health advice and appropriate referrals for health education or preventive services given if needed, as noted in patient's After Visit Summary.    History of Present Illness     HPI   Patient is a 73 year old female with mulitple chronic medical problems as noted below who is being seen today for her annual medicare wellness exam.   She was recently seen on 24 for f/u on her chronic problems. Labs ordered at time of that visit.     She reports having a fall at home yesterday and is having right sided chest pain. States that she was chasing her son's chickens out of her garden. Tripped over her son's boots when she was coming back into garage. Fell forward striking her right chest wall on shoe rack. Knees hit the ground. They are scraped up.     No pain with ambulation.   Some pain with deep breath, coughing  and twisting bending trunk.   Not taking anything for her pain.         Patient Active Problem List   Diagnosis   • GERD (gastroesophageal reflux disease)   • Rectal bleeding   • Abnormal levels of other serum enzymes   • Cough variant asthma   • Current use of proton pump inhibitor   • Deformity of right foot   • Dyspnea   • Essential hypertension   • Generalized atherosclerosis   • Hyperlipidemia   • Hypothyroid   • Lichen planus   • Major depression, recurrent (HCC)   • NAFLD (nonalcoholic fatty liver disease)   • Osteopenia   • Sarcoidosis   • Solitary pulmonary nodule   • Type 2 diabetes mellitus without complication (HCC)   • Vitamin D deficiency   • Hypogammaglobulinemia (HCC)   • Rupture of extensor tendon of foot   • Alcohol abuse   • Closed nondisplaced fracture of proximal phalanx of right great toe   • Seizure (HCC)       Patient Care Team:  Jemma Dailey DO as PCP - General (Family Medicine)  Jemma Dailey DO as PCP - PCP-[facility] ([facility])    Review of Systems  Medical History Reviewed by provider this encounter:  Tobacco  Allergies  Meds  Problems  Med Hx  Surg Hx  Fam Hx  Soc   Hx      Annual Wellness Visit Questionnaire   Viv is here for her Subsequent Wellness visit.     Health Risk Assessment:   Patient rates overall health as good. Patient feels that their physical health rating is same. Patient is satisfied with their life. Eyesight was rated as same. Hearing was rated as same. Patient feels that their emotional and mental health rating is same. Patients states they are never, rarely angry. Patient states they are sometimes unusually tired/fatigued. Pain experienced in the last 7 days has been some. Patient's pain rating has been 5/10. Patient states that she has experienced no weight loss or gain in last 6 months.     Depression Screening:   PHQ-9 Score: 1      Fall Risk Screening:   In the past year, patient has experienced: history of falling in past year    Number of  falls: 2 or more  Injured during fall?: Yes    Feels unsteady when standing or walking?: No    Worried about falling?: Yes      Urinary Incontinence Screening:   Patient has not leaked urine accidently in the last six months.     Home Safety:  Patient does not have trouble with stairs inside or outside of their home. Patient has working smoke alarms and has working carbon monoxide detector. Home safety hazards include: none.     Nutrition:   Current diet is Regular.     Medications:   Patient is currently taking over-the-counter supplements. OTC medications include: see medication list. Patient is able to manage medications.     Activities of Daily Living (ADLs)/Instrumental Activities of Daily Living (IADLs):   Walk and transfer into and out of bed and chair?: Yes  Dress and groom yourself?: Yes    Bathe or shower yourself?: Yes    Feed yourself? Yes  Do your laundry/housekeeping?: Yes  Manage your money, pay your bills and track your expenses?: Yes  Make your own meals?: Yes    Do your own shopping?: Yes    Previous Hospitalizations:   Any hospitalizations or ED visits within the last 12 months?: Yes    How many hospitalizations have you had in the last year?: 1-2    Advance Care Planning:   Living will: Yes    Durable POA for healthcare: Yes    Advanced directive: Yes      Cognitive Screening:   Provider or family/friend/caregiver concerned regarding cognition?: No    PREVENTIVE SCREENINGS      Cardiovascular Screening:    General: Screening Not Indicated and History Lipid Disorder      Diabetes Screening:     General: Screening Not Indicated and History Diabetes      Colorectal Cancer Screening:     General: Screening Current      Breast Cancer Screening:     General: Risks and Benefits Discussed    Due for: Mammogram        Cervical Cancer Screening:    General: Screening Not Indicated      Osteoporosis Screening:    General: Risks and Benefits Discussed    Due for: DXA Axial      Lung Cancer Screening:      "General: Screening Not Indicated      Hepatitis C Screening:    General: Screening Current    Screening, Brief Intervention, and Referral to Treatment (SBIRT)    Screening    Typical number of drinks in a week: 3    Single Item Drug Screening:  How often have you used an illegal drug (including marijuana) or a prescription medication for non-medical reasons in the past year? weekly    Single Item Drug Screen Score: 3  Interpretation: POSITIVE screen for possible drug use disorder    Drug Abuse Screening Test (DAST-10):  1) Have you used drugs other than those required for medical reasons? Yes  2) Do you abuse more than one drug at a time? No  3) Are you always able to stop using drugs when you want to? Yes  4) Have you had \"blackouts\" or \"flashbacks\" as a result of drug use? No  5) Do you ever feel bad or guilty about your drug use? No  6) Does your spouse (or parents) ever complain about your involvement with drugs? No  7) Have you neglected your family because of your use of drugs? No  8) Have you engaged in illegal activities in order to obtain drugs? No  9) Have you ever experienced withdrawal symptoms (felt sick) when you stopped taking drugs? No  10) Have you had medical problems as a result of your drug use (e.g., memory loss, hepatitis, convulsions, bleeding, etc.)? No    DAST-10 Score: 1  Interpretation: Low level problems related to drug abuse    Brief Intervention  Alcohol & drug use screenings were reviewed. No concerns regarding substance use disorder identified.     Other Counseling Topics:   Car/seat belt/driving safety and calcium and vitamin D intake and regular weightbearing exercise.     Social Determinants of Health     Financial Resource Strain: Low Risk  (5/17/2023)    Overall Financial Resource Strain (CARDIA)    • Difficulty of Paying Living Expenses: Not hard at all   Food Insecurity: No Food Insecurity (5/21/2024)    Hunger Vital Sign    • Worried About Running Out of Food in the Last Year: " "Never true    • Ran Out of Food in the Last Year: Never true   Transportation Needs: No Transportation Needs (5/21/2024)    PRAPARE - Transportation    • Lack of Transportation (Medical): No    • Lack of Transportation (Non-Medical): No   Housing Stability: Low Risk  (5/21/2024)    Housing Stability Vital Sign    • Unable to Pay for Housing in the Last Year: No    • Number of Times Moved in the Last Year: 0    • Homeless in the Last Year: No   Utilities: Not At Risk (5/21/2024)    [facility] Utilities    • Threatened with loss of utilities: No     No results found.    Objective     /70   Pulse 84   Temp 97.9 °F (36.6 °C) (Tympanic)   Ht 5' 4\" (1.626 m)   Wt 69.4 kg (153 lb)   LMP  (LMP Unknown)   SpO2 96%   BMI 26.26 kg/m²     Physical Exam  Vitals and nursing note reviewed.   Constitutional:       General: She is not in acute distress.     Appearance: Normal appearance. She is not ill-appearing, toxic-appearing or diaphoretic.   HENT:      Head: Normocephalic and atraumatic.   Cardiovascular:      Rate and Rhythm: Normal rate and regular rhythm.   Pulmonary:      Effort: Pulmonary effort is normal.      Breath sounds: Normal breath sounds.   Musculoskeletal:      Cervical back: Normal range of motion and neck supple.      Comments: Right chest wall with 4 cm area of ecchymosis anteriorly. + TTP  Bilateral knees with full range of motion. No swelling or deformity. There are small abrasions bilateral knees anteriorly with some ecchymosis. + TTP   Lymphadenopathy:      Cervical: No cervical adenopathy.   Neurological:      General: No focal deficit present.      Mental Status: She is alert and oriented to person, place, and time.   Psychiatric:         Mood and Affect: Mood normal.   Administrative Statements           "

## 2024-05-21 NOTE — PATIENT INSTRUCTIONS
Medicare Preventive Visit Patient Instructions  Thank you for completing your Welcome to Medicare Visit or Medicare Annual Wellness Visit today. Your next wellness visit will be due in one year (5/22/2025).  The screening/preventive services that you may require over the next 5-10 years are detailed below. Some tests may not apply to you based off risk factors and/or age. Screening tests ordered at today's visit but not completed yet may show as past due. Also, please note that scanned in results may not display below.  Preventive Screenings:  Service Recommendations Previous Testing/Comments   Colorectal Cancer Screening  * Colonoscopy    * Fecal Occult Blood Test (FOBT)/Fecal Immunochemical Test (FIT)  * Fecal DNA/Cologuard Test  * Flexible Sigmoidoscopy Age: 45-75 years old   Colonoscopy: every 10 years (may be performed more frequently if at higher risk)  OR  FOBT/FIT: every 1 year  OR  Cologuard: every 3 years  OR  Sigmoidoscopy: every 5 years  Screening may be recommended earlier than age 45 if at higher risk for colorectal cancer. Also, an individualized decision between you and your healthcare provider will decide whether screening between the ages of 76-85 would be appropriate. Colonoscopy: 03/21/2023  FOBT/FIT: Not on file  Cologuard: Not on file  Sigmoidoscopy: Not on file          Breast Cancer Screening Age: 40+ years old  Frequency: every 1-2 years  Not required if history of left and right mastectomy Mammogram: 03/09/2023        Cervical Cancer Screening Between the ages of 21-29, pap smear recommended once every 3 years.   Between the ages of 30-65, can perform pap smear with HPV co-testing every 5 years.   Recommendations may differ for women with a history of total hysterectomy, cervical cancer, or abnormal pap smears in past. Pap Smear: Not on file        Hepatitis C Screening Once for adults born between 1945 and 1965  More frequently in patients at high risk for Hepatitis C Hep C Antibody: Not  on file        Diabetes Screening 1-2 times per year if you're at risk for diabetes or have pre-diabetes Fasting glucose: No results in last 5 years (No results in last 5 years)  A1C: 7.5 % (11/28/2023)      Cholesterol Screening Once every 5 years if you don't have a lipid disorder. May order more often based on risk factors. Lipid panel: 11/28/2023          Other Preventive Screenings Covered by Medicare:  Abdominal Aortic Aneurysm (AAA) Screening: covered once if your at risk. You're considered to be at risk if you have a family history of AAA.  Lung Cancer Screening: covers low dose CT scan once per year if you meet all of the following conditions: (1) Age 55-77; (2) No signs or symptoms of lung cancer; (3) Current smoker or have quit smoking within the last 15 years; (4) You have a tobacco smoking history of at least 20 pack years (packs per day multiplied by number of years you smoked); (5) You get a written order from a healthcare provider.  Glaucoma Screening: covered annually if you're considered high risk: (1) You have diabetes OR (2) Family history of glaucoma OR (3)  aged 50 and older OR (4)  American aged 65 and older  Osteoporosis Screening: covered every 2 years if you meet one of the following conditions: (1) You're estrogen deficient and at risk for osteoporosis based off medical history and other findings; (2) Have a vertebral abnormality; (3) On glucocorticoid therapy for more than 3 months; (4) Have primary hyperparathyroidism; (5) On osteoporosis medications and need to assess response to drug therapy.   Last bone density test (DXA Scan): Not on file.  HIV Screening: covered annually if you're between the age of 15-65. Also covered annually if you are younger than 15 and older than 65 with risk factors for HIV infection. For pregnant patients, it is covered up to 3 times per pregnancy.    Immunizations:  Immunization Recommendations   Influenza Vaccine Annual influenza  vaccination during flu season is recommended for all persons aged >= 6 months who do not have contraindications   Pneumococcal Vaccine   * Pneumococcal conjugate vaccine = PCV13 (Prevnar 13), PCV15 (Vaxneuvance), PCV20 (Prevnar 20)  * Pneumococcal polysaccharide vaccine = PPSV23 (Pneumovax) Adults 19-63 yo with certain risk factors or if 65+ yo  If never received any pneumonia vaccine: recommend Prevnar 20 (PCV20)  Give PCV20 if previously received 1 dose of PCV13 or PPSV23   Hepatitis B Vaccine 3 dose series if at intermediate or high risk (ex: diabetes, end stage renal disease, liver disease)   Respiratory syncytial virus (RSV) Vaccine - COVERED BY MEDICARE PART D  * RSVPreF3 (Arexvy) CDC recommends that adults 60 years of age and older may receive a single dose of RSV vaccine using shared clinical decision-making (SCDM)   Tetanus (Td) Vaccine - COST NOT COVERED BY MEDICARE PART B Following completion of primary series, a booster dose should be given every 10 years to maintain immunity against tetanus. Td may also be given as tetanus wound prophylaxis.   Tdap Vaccine - COST NOT COVERED BY MEDICARE PART B Recommended at least once for all adults. For pregnant patients, recommended with each pregnancy.   Shingles Vaccine (Shingrix) - COST NOT COVERED BY MEDICARE PART B  2 shot series recommended in those 19 years and older who have or will have weakened immune systems or those 50 years and older     Health Maintenance Due:      Topic Date Due   • Hepatitis C Screening  Never done   • Breast Cancer Screening: Mammogram  03/09/2024   • Colorectal Cancer Screening  03/19/2028     Immunizations Due:      Topic Date Due   • Hepatitis A Vaccine (2 of 2 - Risk 2-dose series) 09/02/2005   • COVID-19 Vaccine (1 - 2023-24 season) Never done     Advance Directives   What are advance directives?  Advance directives are legal documents that state your wishes and plans for medical care. These plans are made ahead of time in case  you lose your ability to make decisions for yourself. Advance directives can apply to any medical decision, such as the treatments you want, and if you want to donate organs.   What are the types of advance directives?  There are many types of advance directives, and each state has rules about how to use them. You may choose a combination of any of the following:  Living will:  This is a written record of the treatment you want. You can also choose which treatments you do not want, which to limit, and which to stop at a certain time. This includes surgery, medicine, IV fluid, and tube feedings.   Durable power of  for healthcare (DPAHC):  This is a written record that states who you want to make healthcare choices for you when you are unable to make them for yourself. This person, called a proxy, is usually a family member or a friend. You may choose more than 1 proxy.  Do not resuscitate (DNR) order:  A DNR order is used in case your heart stops beating or you stop breathing. It is a request not to have certain forms of treatment, such as CPR. A DNR order may be included in other types of advance directives.  Medical directive:  This covers the care that you want if you are in a coma, near death, or unable to make decisions for yourself. You can list the treatments you want for each condition. Treatment may include pain medicine, surgery, blood transfusions, dialysis, IV or tube feedings, and a ventilator (breathing machine).  Values history:  This document has questions about your views, beliefs, and how you feel and think about life. This information can help others choose the care that you would choose.  Why are advance directives important?  An advance directive helps you control your care. Although spoken wishes may be used, it is better to have your wishes written down. Spoken wishes can be misunderstood, or not followed. Treatments may be given even if you do not want them. An advance directive may  make it easier for your family to make difficult choices about your care.   Weight Management   Why it is important to manage your weight:  Being overweight increases your risk of health conditions such as heart disease, high blood pressure, type 2 diabetes, and certain types of cancer. It can also increase your risk for osteoarthritis, sleep apnea, and other respiratory problems. Aim for a slow, steady weight loss. Even a small amount of weight loss can lower your risk of health problems.  How to lose weight safely:  A safe and healthy way to lose weight is to eat fewer calories and get regular exercise. You can lose up about 1 pound a week by decreasing the number of calories you eat by 500 calories each day.   Healthy meal plan for weight management:  A healthy meal plan includes a variety of foods, contains fewer calories, and helps you stay healthy. A healthy meal plan includes the following:  Eat whole-grain foods more often.  A healthy meal plan should contain fiber. Fiber is the part of grains, fruits, and vegetables that is not broken down by your body. Whole-grain foods are healthy and provide extra fiber in your diet. Some examples of whole-grain foods are whole-wheat breads and pastas, oatmeal, brown rice, and bulgur.  Eat a variety of vegetables every day.  Include dark, leafy greens such as spinach, kale, girish greens, and mustard greens. Eat yellow and orange vegetables such as carrots, sweet potatoes, and winter squash.   Eat a variety of fruits every day.  Choose fresh or canned fruit (canned in its own juice or light syrup) instead of juice. Fruit juice has very little or no fiber.  Eat low-fat dairy foods.  Drink fat-free (skim) milk or 1% milk. Eat fat-free yogurt and low-fat cottage cheese. Try low-fat cheeses such as mozzarella and other reduced-fat cheeses.  Choose meat and other protein foods that are low in fat.  Choose beans or other legumes such as split peas or lentils. Choose fish,  skinless poultry (chicken or turkey), or lean cuts of red meat (beef or pork). Before you cook meat or poultry, cut off any visible fat.   Use less fat and oil.  Try baking foods instead of frying them. Add less fat, such as margarine, sour cream, regular salad dressing and mayonnaise to foods. Eat fewer high-fat foods. Some examples of high-fat foods include french fries, doughnuts, ice cream, and cakes.  Eat fewer sweets.  Limit foods and drinks that are high in sugar. This includes candy, cookies, regular soda, and sweetened drinks.  Exercise:  Exercise at least 30 minutes per day on most days of the week. Some examples of exercise include walking, biking, dancing, and swimming. You can also fit in more physical activity by taking the stairs instead of the elevator or parking farther away from stores. Ask your healthcare provider about the best exercise plan for you.      © Copyright [employer] 2018 Information is for End User's use only and may not be sold, redistributed or otherwise used for commercial purposes. All illustrations and images included in CareNotes® are the copyrighted property of A.D.A.M., Inc. or [employer]

## 2024-05-22 ENCOUNTER — TELEPHONE (OUTPATIENT)
Dept: FAMILY MEDICINE CLINIC | Facility: CLINIC | Age: 74
End: 2024-05-22

## 2024-05-22 NOTE — TELEPHONE ENCOUNTER
----- Message from Jemma Dailey DO sent at 5/22/2024  8:12 AM EDT -----  Let patient know that xray of ribs showed possible non-displaced fractures of right 5th and 7th ribs. Really not much that can be done for rib fractures other than control pain. Recommend she continue tylenol since that seems to be helping her.   Recommend she take deep breaths as we had discussed to prevent pneumonia. Continue to ice the area.

## 2024-06-21 DIAGNOSIS — K21.9 GASTROESOPHAGEAL REFLUX DISEASE, UNSPECIFIED WHETHER ESOPHAGITIS PRESENT: ICD-10-CM

## 2024-06-21 RX ORDER — DEXLANSOPRAZOLE 60 MG/1
CAPSULE, DELAYED RELEASE ORAL
Qty: 90 CAPSULE | Refills: 1 | Status: SHIPPED | OUTPATIENT
Start: 2024-06-21

## 2024-07-08 DIAGNOSIS — I10 ESSENTIAL HYPERTENSION: ICD-10-CM

## 2024-07-08 RX ORDER — AMLODIPINE BESYLATE 10 MG/1
10 TABLET ORAL DAILY
Qty: 100 TABLET | Refills: 1 | Status: SHIPPED | OUTPATIENT
Start: 2024-07-08

## 2024-07-12 DIAGNOSIS — E03.9 HYPOTHYROIDISM, UNSPECIFIED TYPE: ICD-10-CM

## 2024-07-12 RX ORDER — LEVOTHYROXINE SODIUM 88 UG/1
88 TABLET ORAL DAILY
Qty: 90 TABLET | Refills: 3 | Status: SHIPPED | OUTPATIENT
Start: 2024-07-12

## 2024-08-22 DIAGNOSIS — I10 ESSENTIAL HYPERTENSION: ICD-10-CM

## 2024-08-22 RX ORDER — HYDROCHLOROTHIAZIDE 25 MG/1
25 TABLET ORAL DAILY
Qty: 90 TABLET | Refills: 1 | Status: SHIPPED | OUTPATIENT
Start: 2024-08-22

## 2024-09-06 DIAGNOSIS — E78.5 HYPERLIPIDEMIA, UNSPECIFIED HYPERLIPIDEMIA TYPE: ICD-10-CM

## 2024-09-06 RX ORDER — ROSUVASTATIN CALCIUM 20 MG/1
20 TABLET, COATED ORAL DAILY
Qty: 90 TABLET | Refills: 3 | Status: SHIPPED | OUTPATIENT
Start: 2024-09-06

## 2024-09-23 ENCOUNTER — HOSPITAL ENCOUNTER (OUTPATIENT)
Dept: MAMMOGRAPHY | Facility: CLINIC | Age: 74
Discharge: HOME | End: 2024-09-23
Payer: COMMERCIAL

## 2024-09-23 VITALS — WEIGHT: 153 LBS | HEIGHT: 64 IN | BODY MASS INDEX: 26.12 KG/M2

## 2024-09-23 DIAGNOSIS — Z12.31 ENCOUNTER FOR SCREENING MAMMOGRAM FOR MALIGNANT NEOPLASM OF BREAST: ICD-10-CM

## 2024-09-23 DIAGNOSIS — I10 ESSENTIAL HYPERTENSION: ICD-10-CM

## 2024-09-23 PROCEDURE — 77067 SCR MAMMO BI INCL CAD: CPT

## 2024-09-23 PROCEDURE — 77063 BREAST TOMOSYNTHESIS BI: CPT

## 2024-09-23 RX ORDER — LOSARTAN POTASSIUM 100 MG/1
100 TABLET ORAL DAILY
Qty: 90 TABLET | Refills: 1 | Status: SHIPPED | OUTPATIENT
Start: 2024-09-23

## 2024-10-22 ENCOUNTER — OFFICE VISIT (OUTPATIENT)
Age: 74
End: 2024-10-22
Payer: COMMERCIAL

## 2024-10-22 VITALS
BODY MASS INDEX: 25.54 KG/M2 | WEIGHT: 149.6 LBS | DIASTOLIC BLOOD PRESSURE: 80 MMHG | HEIGHT: 64 IN | SYSTOLIC BLOOD PRESSURE: 148 MMHG

## 2024-10-22 DIAGNOSIS — K62.5 RECTAL BLEEDING: ICD-10-CM

## 2024-10-22 DIAGNOSIS — K21.9 GASTROESOPHAGEAL REFLUX DISEASE, UNSPECIFIED WHETHER ESOPHAGITIS PRESENT: Primary | ICD-10-CM

## 2024-10-22 DIAGNOSIS — R79.89 ELEVATED LFTS: ICD-10-CM

## 2024-10-22 DIAGNOSIS — K64.9 HEMORRHOIDS, UNSPECIFIED HEMORRHOID TYPE: ICD-10-CM

## 2024-10-22 PROCEDURE — 99213 OFFICE O/P EST LOW 20 MIN: CPT | Performed by: INTERNAL MEDICINE

## 2024-10-22 NOTE — PROGRESS NOTES
[facility] - Outpatient Follow-up Note  Viv Corona 74 y.o. female MRN: [id number]  Encounter: [id number]    ASSESSMENT AND PLAN:         1. Gastroesophageal reflux disease, unspecified whether esophagitis present  2. Hemorrhoids, unspecified hemorrhoid type  3. Rectal bleeding  4. Elevated LFTs     Assessment & Plan  1. Hemorrhoids.  Banding was recommended as the most suitable treatment option at this time. Likely the cause of the rectal bleeding. Continue fiber.    [facility] - Hemorrhoid Banding Viv Corona 74 y.o. female MRN: [id number]  Encounter: [id number]    ASSESSMENT AND PLAN:    The right anterior hemorrhoid area was banded 10/22/2024. The patient was instructed to avoid constipation and straining, and educated in appropriate fiber intake.     HPI: Viv Corona is a 74 y.o. year old female who presents with rectal bleeding due to hemorrhoids.     Previous treatments include: none  Bands were previously placed: none   Current Fiber intake: encouraged supplementation  Complications of prior therapy include: NA    The patient provided consent for banding  and was placed in the left lateral position  Rectal exam showed prolapsing hemorrhoid in RA location  The O'Ole ligator was advanced and a band was applied without difficulty   Repeat rectal exam confirmed appropriate placement  The patient was discharged home after observation in the waiting area  The exam was chaperoned by nursing staff    2. GERD  Controlled on dexilant 30mg once daily. Continue at this time. Recommended lifestyle modifications of weight loss, diet and exercise, avoidance of triggers, avoidance of late-night meals, elevation of the head of the bed    3. Elevated LFTs  Patient with history of elevated LFTs ALT of 36 AST of 42 alk phos of 130.  Likely in the setting of fatty liver disease.  She is due for repeat LFTs.  To monitor.  Continue exercise  and weight loss.  If persistently elevated can consider getting ultrasound of the right upper quadrant along with nutrition consult.    ---    Chief Complaint   Patient presents with    Banding     Pt due for initial banding.        HPI:   iVv Corona is a 74 y.o. female presenting for follow up.   History of Present Illness  The patient is a 74-year-old female presenting for a follow-up visit. She was last seen in the office in 02/2023.    She has a history of diarrhea and GERD. In 03/2023, she underwent a colonoscopy due to episodes of diarrhea and rectal bleeding. The colonoscopy revealed small internal hemorrhoids, and random biopsies were unremarkable for any microscopic colitis. A recall for the procedure was recommended in 5 years.    She reports experiencing bleeding from her hemorrhoids. She is also presenting for banding.    Her reflux symptoms are well-managed with Dexilant 30 mg once daily. She does not experience heartburn, nausea, vomiting, or difficulty swallowing.    She mentions intermittent diarrhea, which she believes is influenced by her diet.       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 bland. recall 5 years     Past  Surgical History:   Procedure Laterality Date    BREAST BIOPSY Right     benign    CARDIAC CATHETERIZATION      CATARACT EXTRACTION, BILATERAL      COLONOSCOPY      CORNEA LACERATION REPAIR Right     EYE SURGERY  10 years ago    REFRACTIVE SURGERY      TONSILLECTOMY      UPPER GASTROINTESTINAL ENDOSCOPY      WISDOM TOOTH EXTRACTION       Social History     Substance and Sexual Activity   Alcohol Use Not Currently    Alcohol/week: 14.0 standard drinks of alcohol    Types: 14 Glasses of wine per week    Comment: Only one or two drinks if out for dinner     Social History     Substance and Sexual Activity   Drug Use Yes    Frequency: 3.0 times per week    Types: Marijuana     Social History     Tobacco Use   Smoking Status Former    Current packs/day: 0.00    Average packs/day: 0.3 packs/day for 25.2 years (6.3 ttl pk-yrs)    Types: Cigarettes    Start date: 1961    Quit date:     Years since quittin.8   Smokeless Tobacco Never     Family History   Problem Relation Age of Onset    Heart disease Mother     Heart disease Father     Stroke Sister     Heart disease Sister     Lung cancer Sister 65    Cancer Sister     No Known Problems Daughter     No Known Problems Maternal Grandmother     No Known Problems Maternal Grandfather     No Known Problems Paternal Grandmother     No Known Problems Paternal Grandfather     Heart disease Brother     No Known Problems Maternal Aunt     No Known Problems Maternal Aunt     No Known Problems Maternal Aunt     No Known Problems Maternal Aunt     No Known Problems Paternal Aunt     No Known Problems Paternal Aunt     Colon polyps Neg Hx     Colon cancer Neg Hx        Meds/Allergies     Current Outpatient Medications:     albuterol (PROVENTIL HFA,VENTOLIN HFA) 90 mcg/act inhaler    amLODIPine (NORVASC) 10 mg tablet    ascorbic acid (VITAMIN C) 500 mg tablet    Biotin 5000 MCG CAPS    budesonide (PULMICORT) 0.5 mg/2 mL nebulizer solution    calcium carbonate (OS-BRITTA) 600 MG  "tablet    cholecalciferol (VITAMIN D3) 1,000 units tablet    dexlansoprazole (DEXILANT) 60 MG capsule    GUAIFENESIN 1200 PO    hydroCHLOROthiazide 25 mg tablet    ipratropium-albuterol, FOR EMS ONLY, (DUO-NEB) 0.5-2.5 mg/3 mL nebulizer solution    Klor-Con M20 20 MEQ tablet    levothyroxine 88 mcg tablet    losartan (COZAAR) 100 MG tablet    metFORMIN (GLUCOPHAGE-XR) 500 mg 24 hr tablet    Ozempic, 0.25 or 0.5 MG/DOSE, 2 MG/3ML injection pen    Qvar RediHaler 40 MCG/ACT inhaler    rosuvastatin (CRESTOR) 20 MG tablet    venlafaxine (EFFEXOR-XR) 150 mg 24 hr capsule    venlafaxine (EFFEXOR-XR) 75 mg 24 hr capsule  Allergies   Allergen Reactions    Epinephrine Shortness Of Breath and Palpitations    Carisoprodol Other (See Comments)     Other reaction(s): SOMA (11/04/2010)      Irbesartan-Hydrochlorothiazide Other (See Comments)     Possible lichen planus    Lisinopril      Lichen planus    Other Other (See Comments)     Other reaction(s): Muscle Relaxers (09/03/2014)      Tizanidine      Altered mental status       PHYSICAL EXAM:    Blood pressure 148/80, height 5' 4\" (1.626 m), weight 67.9 kg (149 lb 9.6 oz). Body mass index is 25.68 kg/m².  Physical Exam    General Appearance: No apparent distress, cooperative, alert.  Eyes: Anicteric.  Gastrointestinal: Soft, non-tender, non-distended; normal bowel sounds; no masses, no organomegaly.    Rectal: Deferred.  Musculoskeletal: No edema.  Skin: No jaundice.     OTHER LAB RESULTS:   Lab Results   Component Value Date    WBC 6.3 07/12/2023    WBC 10.1 03/24/2022    HGB 14.6 07/12/2023    HGB 14.5 03/24/2022    MCV 99 (H) 07/12/2023     07/12/2023     03/24/2022     Lab Results   Component Value Date    K 4.1 11/28/2023    CL 94 (L) 11/28/2023    CO2 27 11/28/2023    BUN 10 11/28/2023    CREATININE 0.81 11/28/2023    AST 42 (H) 11/28/2023    AST 58 (H) 07/12/2023    AST 55 (H) 11/16/2022    ALT 36 (H) 11/28/2023    ALT 50 (H) 07/12/2023    ALT 48 (H) " "11/16/2022    ALB 4.6 11/28/2023    TBILI 0.8 11/28/2023    TBILI 0.4 07/12/2023    TBILI 0.6 11/16/2022    EGFR 76 11/28/2023     No results found for: \"IRON\", \"TIBC\", \"FERRITIN\"  No results found for: \"LIPASE\"    OTHER RADIOLOGY RESULTS:   Mammo screening bilateral w 3d & cad    Result Date: 9/25/2024  Narrative: DIAGNOSIS: Encounter for screening mammogram for malignant neoplasm of breast TECHNIQUE: Digital screening mammography was performed. Computer Aided Detection (CAD) analyzed all applicable images. COMPARISONS: Prior breast imaging dated: 03/09/2023, 04/29/2012, 10/06/2010, and 10/02/2009 RELEVANT HISTORY: Family Breast Cancer History: No known family history of breast cancer. Family Medical History: No known relevant family medical history. Personal History: Hormone history includes birth control and estrogen replacement therapy. Surgical history includes breast biopsy. No known relevant medical history. The patient is scheduled in a reminder system for screening mammography. 8-10% of cancers will be missed on mammography. Management of a palpable abnormality must be based on clinical grounds.  Patients will be notified of their results via letter from our facility. Accredited by [employer] College [employer] and FDA. RISK ASSESSMENT: 5 Year Tyrer-Cuzick: 1.67% 10 Year Tyrer-Cuzick: 3.54% Lifetime Tyrer-Cuzick: 3.54% TISSUE DENSITY: The breasts are heterogeneously dense, which may obscure small masses. INDICATION: Viv Corona is a 74 y.o. female presenting for screening mammography. FINDINGS: Bilateral There are no suspicious masses, grouped microcalcifications or areas of unexplained architectural distortion.  Scattered benign-appearing calcifications are seen in both breasts.  The skin and nipple areolar complex are unremarkable.      Impression: No mammographic evidence of malignancy. No significant change ASSESSMENT/BI-RADS CATEGORY: Left: 2 - Benign Right: 2 - Benign Overall: 2 - Benign " RECOMMENDATION:      - Routine screening mammogram in 1 year for both breasts. Workstation ID: [id number] Signed by:  Cecille Quintanilla MD

## 2024-11-08 ENCOUNTER — DOCUMENTATION (OUTPATIENT)
Dept: PSYCHIATRY | Facility: CLINIC | Age: 74
End: 2024-11-08

## 2024-11-08 NOTE — PROGRESS NOTES
Psychotherapy Discharge Summary    Preferred Name: Sera Corona  YOB: 1950    Admission date to psychotherapy: 10/30/2023    Referred by: self    Presenting Problem: Client was having trouble adjusting to living with her son and his family.     Course of treatment included : individual therapy     Progress/Outcome of Treatment Goals (brief summary of course of treatment) Client discontinued sessions as she was having medical concerns     Treatment Complications (if any): Client did not return     Treatment Progress: fair    Current [facility] Provider: janelle    Discharge Medications include: unk    Discharge Date: 11/8/24    Discharge Diagnosis: No diagnosis found.    Criteria for Discharge: two or more unexcused absences for services    Aftercare recommendations include (include specific referral names and phone numbers, if appropriate): Client is welcome to return to therapy should it become more convenient     Prognosis: fair

## 2024-11-15 ENCOUNTER — TELEPHONE (OUTPATIENT)
Dept: PSYCHIATRY | Facility: CLINIC | Age: 74
End: 2024-11-15

## 2024-11-15 NOTE — TELEPHONE ENCOUNTER
DISCHARGE LETTER for AERIAL MADELINE (certified) placed in outgoing mail on 11/19/2024.    Article #:  [id number] [id number]    Address:  [address]  [place] [phone]

## 2024-11-25 DIAGNOSIS — E11.9 TYPE 2 DIABETES MELLITUS WITHOUT COMPLICATION, WITHOUT LONG-TERM CURRENT USE OF INSULIN (HCC): ICD-10-CM

## 2024-11-26 RX ORDER — SEMAGLUTIDE 0.68 MG/ML
INJECTION, SOLUTION SUBCUTANEOUS
Qty: 9 ML | Refills: 1 | Status: SHIPPED | OUTPATIENT
Start: 2024-11-26

## 2024-12-04 DIAGNOSIS — K21.9 GASTROESOPHAGEAL REFLUX DISEASE, UNSPECIFIED WHETHER ESOPHAGITIS PRESENT: ICD-10-CM

## 2024-12-04 RX ORDER — DEXLANSOPRAZOLE 60 MG/1
60 CAPSULE, DELAYED RELEASE ORAL DAILY
Qty: 30 CAPSULE | Refills: 0 | Status: SHIPPED | OUTPATIENT
Start: 2024-12-04

## 2024-12-04 NOTE — TELEPHONE ENCOUNTER
[facility].     Called and lmom for pt to CB, needs to schedule OV for Diabetic check & needs labs completed-there are labs ordered to [facility]-patient should complete fasting.

## 2024-12-10 NOTE — TELEPHONE ENCOUNTER
Patient called back scheduled her for tomorrow at 2:00. She will go for BW in the morning. Reminded her that BW needs to be done fasting.  ANA

## 2024-12-12 ENCOUNTER — OFFICE VISIT (OUTPATIENT)
Age: 74
End: 2024-12-12
Payer: COMMERCIAL

## 2024-12-12 VITALS
WEIGHT: 143.8 LBS | BODY MASS INDEX: 24.55 KG/M2 | DIASTOLIC BLOOD PRESSURE: 78 MMHG | HEIGHT: 64 IN | SYSTOLIC BLOOD PRESSURE: 126 MMHG

## 2024-12-12 DIAGNOSIS — K64.9 HEMORRHOIDS, UNSPECIFIED HEMORRHOID TYPE: Primary | ICD-10-CM

## 2024-12-12 DIAGNOSIS — K62.5 RECTAL BLEEDING: ICD-10-CM

## 2024-12-12 PROCEDURE — 46221 LIGATION OF HEMORRHOID(S): CPT | Performed by: INTERNAL MEDICINE

## 2024-12-12 NOTE — PROGRESS NOTES
[facility] - Hemorrhoid Banding Viv Corona 74 y.o. female MRN: [id number]  Encounter: [id number]    ASSESSMENT AND PLAN:    The left lateral hemorrhoid area was banded 12/12/2024. The patient was instructed to avoid constipation and straining, and educated in appropriate fiber intake.     HPI: Viv Corona is a 74 y.o. year old female who presents with rectal bleeding due to hemorrhoids.     Previous treatments include: RA  Bands were previously placed: RA   Current Fiber intake: encouraged supplementation  Complications of prior therapy include: none    The patient provided consent for banding  and was placed in the left lateral position  Rectal exam showed skin tags  The O'Ole ligator was advanced and a band was applied without difficulty   Repeat rectal exam confirmed appropriate placement  The patient was discharged home after observation in the waiting area  The exam was chaperoned by nursing staff

## 2024-12-13 ENCOUNTER — RESULTS FOLLOW-UP (OUTPATIENT)
Dept: FAMILY MEDICINE CLINIC | Facility: CLINIC | Age: 74
End: 2024-12-13

## 2024-12-13 LAB
ALBUMIN SERPL-MCNC: 4.4 G/DL (ref 3.8–4.8)
ALP SERPL-CCNC: 115 IU/L (ref 44–121)
ALT SERPL-CCNC: 48 IU/L (ref 0–32)
AST SERPL-CCNC: 73 IU/L (ref 0–40)
BILIRUB SERPL-MCNC: 1.1 MG/DL (ref 0–1.2)
BUN SERPL-MCNC: 14 MG/DL (ref 8–27)
BUN/CREAT SERPL: 18 (ref 12–28)
CALCIUM SERPL-MCNC: 9.9 MG/DL (ref 8.7–10.3)
CHLORIDE SERPL-SCNC: 99 MMOL/L (ref 96–106)
CHOLEST SERPL-MCNC: 228 MG/DL (ref 100–199)
CO2 SERPL-SCNC: 24 MMOL/L (ref 20–29)
CREAT SERPL-MCNC: 0.8 MG/DL (ref 0.57–1)
EGFR: 77 ML/MIN/1.73
EST. AVERAGE GLUCOSE BLD GHB EST-MCNC: 146 MG/DL
GLOBULIN SER-MCNC: 2.4 G/DL (ref 1.5–4.5)
GLUCOSE SERPL-MCNC: 136 MG/DL (ref 70–99)
HBA1C MFR BLD: 6.7 % (ref 4.8–5.6)
HDLC SERPL-MCNC: 57 MG/DL
LDLC SERPL CALC-MCNC: 134 MG/DL (ref 0–99)
LDLC/HDLC SERPL: 2.4 RATIO (ref 0–3.2)
POTASSIUM SERPL-SCNC: 4.6 MMOL/L (ref 3.5–5.2)
PROT SERPL-MCNC: 6.8 G/DL (ref 6–8.5)
SL AMB VLDL CHOLESTEROL CALC: 37 MG/DL (ref 5–40)
SODIUM SERPL-SCNC: 137 MMOL/L (ref 134–144)
TRIGL SERPL-MCNC: 208 MG/DL (ref 0–149)
TSH SERPL DL<=0.005 MIU/L-ACNC: 0.76 UIU/ML (ref 0.45–4.5)

## 2024-12-13 NOTE — TELEPHONE ENCOUNTER
----- Message from Jemma Dailey DO sent at 12/13/2024  9:29 AM EST -----  Labs reviewed.   Call her to reschedule appt

## 2024-12-30 ENCOUNTER — CONSULT (OUTPATIENT)
Dept: CARDIOLOGY CLINIC | Facility: CLINIC | Age: 74
End: 2024-12-30
Payer: COMMERCIAL

## 2024-12-30 VITALS
BODY MASS INDEX: 25.27 KG/M2 | HEART RATE: 79 BPM | WEIGHT: 148 LBS | HEIGHT: 64 IN | SYSTOLIC BLOOD PRESSURE: 136 MMHG | DIASTOLIC BLOOD PRESSURE: 66 MMHG

## 2024-12-30 DIAGNOSIS — Z86.79 HISTORY OF CORONARY VASOSPASM: ICD-10-CM

## 2024-12-30 DIAGNOSIS — I20.1 CORONARY VASOSPASM (HCC): ICD-10-CM

## 2024-12-30 DIAGNOSIS — E11.9 TYPE 2 DIABETES MELLITUS WITHOUT COMPLICATION, WITHOUT LONG-TERM CURRENT USE OF INSULIN (HCC): ICD-10-CM

## 2024-12-30 DIAGNOSIS — E78.2 MIXED HYPERLIPIDEMIA: ICD-10-CM

## 2024-12-30 DIAGNOSIS — I10 ESSENTIAL HYPERTENSION: ICD-10-CM

## 2024-12-30 DIAGNOSIS — R41.82 ALTERED MENTAL STATUS, UNSPECIFIED ALTERED MENTAL STATUS TYPE: Primary | ICD-10-CM

## 2024-12-30 PROCEDURE — 93000 ELECTROCARDIOGRAM COMPLETE: CPT | Performed by: INTERNAL MEDICINE

## 2024-12-30 PROCEDURE — 99204 OFFICE O/P NEW MOD 45 MIN: CPT | Performed by: INTERNAL MEDICINE

## 2024-12-30 RX ORDER — EZETIMIBE 10 MG/1
10 TABLET ORAL DAILY
Qty: 90 TABLET | Refills: 3 | Status: SHIPPED | OUTPATIENT
Start: 2024-12-30

## 2024-12-30 NOTE — PATIENT INSTRUCTIONS
Recommendations:  Start Zetia 10mg daily.   Continue remainder of medications.  No cardiac testing necessary at this time.  Follow up on an as needed basis.

## 2024-12-30 NOTE — PROGRESS NOTES
Cardiology   Viv Corona 74 y.o. female MRN: [id number]        Impression:  Coronary vasospasm - likely in setting of coronary angiography 20 yrs ago.  This can occur as a result of placing catheter in coronary artery.  No further symptoms.  No suggestion of obstructive CAD. No further testing.  Hypertension - controlled.  Dyslipidemia - not adequate control.    Recommendations:  Start Zetia 10mg daily.   Continue remainder of medications.  No cardiac testing necessary at this time.  Follow up on an as needed basis.       HPI: Viv Corona is a 74 y.o. year old female with pulmonary sarcoidosis, seizure disorder, hypertension, dyslipidemia, and questionable history of coronary vasospasm (occurred over 20 yrs ago).  Echo 11/23 - EF 60%, no valvular heart disease. No chest pain, shortness of breath, or palpitations.         Review of Systems   Constitutional: Negative.    HENT: Negative.     Eyes: Negative.    Respiratory:  Negative for chest tightness and shortness of breath.    Cardiovascular:  Negative for chest pain, palpitations and leg swelling.   Gastrointestinal: Negative.    Endocrine: Negative.    Genitourinary: Negative.    Musculoskeletal: Negative.    Skin: Negative.    Allergic/Immunologic: Negative.    Neurological: Negative.    Hematological: Negative.    Psychiatric/Behavioral: Negative.     All other systems reviewed and are negative.        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 bland. recall 5 years     Past Surgical History:   Procedure Laterality Date    BREAST BIOPSY Right     benign    CARDIAC CATHETERIZATION      CATARACT EXTRACTION, BILATERAL      COLONOSCOPY      CORNEA LACERATION REPAIR Right     EYE SURGERY  10 years ago    REFRACTIVE SURGERY      TONSILLECTOMY      UPPER GASTROINTESTINAL ENDOSCOPY      WISDOM TOOTH EXTRACTION       Social History     Substance and Sexual Activity   Alcohol Use Not Currently    Alcohol/week: 14.0 standard drinks of alcohol    Types: 14 Glasses of wine per week    Comment: Only one or two drinks if out for dinner     Social History     Substance and Sexual Activity   Drug Use Yes    Frequency: 3.0 times per week    Types: Marijuana     Social History     Tobacco Use   Smoking Status Former    Current packs/day: 0.00    Average packs/day: 0.3 packs/day for 25.2 years (6.3 ttl pk-yrs)    Types: Cigarettes    Start date: 1961    Quit date:     Years since quittin.0    Passive exposure: Past   Smokeless Tobacco Never     Family History   Problem Relation Age of Onset    Heart disease Mother     Heart disease Father     Stroke Sister     Heart disease Sister     Lung cancer Sister 65    Cancer Sister     No Known Problems Daughter     No Known Problems Maternal Grandmother     No Known Problems Maternal Grandfather     No Known Problems Paternal Grandmother     No Known Problems Paternal Grandfather     Heart disease Brother     No Known Problems Maternal Aunt     No Known Problems Maternal Aunt     No Known Problems Maternal Aunt     No Known Problems Maternal Aunt     No Known Problems Paternal Aunt     No Known Problems Paternal Aunt     Colon polyps Neg Hx     Colon cancer Neg Hx         Allergies:  Allergies   Allergen Reactions    Epinephrine Shortness Of Breath and Palpitations    Carisoprodol Other (See Comments)     Other reaction(s): SOMA (11/04/2010)      Irbesartan-Hydrochlorothiazide Other (See Comments)     Possible lichen planus    Lisinopril      Lichen planus    Other Other (See Comments)     Other reaction(s): Muscle Relaxers (09/03/2014)      Tizanidine      Altered mental status       Medications:     Current Outpatient Medications:     albuterol (PROVENTIL HFA,VENTOLIN HFA) 90 mcg/act inhaler, Inhale 2 puffs 4 (four) times a day, Disp: , Rfl:     amLODIPine (NORVASC) 10 mg tablet, TAKE 1 TABLET DAILY, Disp: 100 tablet, Rfl: 1    ascorbic acid (VITAMIN C) 500 mg tablet, Take 500 mg by mouth daily, Disp: , Rfl:     Biotin 5000 MCG CAPS, Take 5,000 mcg by mouth in the morning, Disp: , Rfl:     budesonide (PULMICORT) 0.5 mg/2 mL nebulizer solution, Take 0.5 mg by nebulization as needed in the morning. Rinse mouth after use. ., Disp: , Rfl:     calcium carbonate (OS-BRITTA) 600 MG tablet, Take 600 mg by mouth 2 (two) times a day with meals, Disp: , Rfl:     cholecalciferol (VITAMIN D3) 1,000 units tablet, Take 1,000 Units by mouth daily, Disp: , Rfl:     dexlansoprazole (DEXILANT) 60 MG capsule, Take 1 capsule (60 mg total) by mouth daily, Disp: 30 capsule, Rfl: 0    GUAIFENESIN 1200 PO, Take 1,500 mg by mouth 2 (two) times a day, Disp: , Rfl:     hydroCHLOROthiazide 25 mg tablet, TAKE 1 TABLET DAILY, Disp: 90 tablet, Rfl: 1    ipratropium-albuterol, FOR EMS ONLY, (DUO-NEB) 0.5-2.5 mg/3 mL nebulizer solution, Use 3 mL as needed, Disp: , Rfl:     Klor-Con M20 20 MEQ tablet, TAKE 1 TABLET DAILY, Disp: 90 tablet, Rfl: 3    levothyroxine 88 mcg tablet, TAKE 1 TABLET DAILY, Disp: 90 tablet, Rfl: 3    losartan (COZAAR) 100 MG tablet, TAKE 1 TABLET DAILY, Disp: 90 tablet, Rfl: 1    metFORMIN (GLUCOPHAGE-XR) 500 mg 24 hr tablet, TAKE 2 TABLETS DAILY, Disp: 90 tablet, Rfl: 7    Ozempic, 0.25  or 0.5 MG/DOSE, 2 MG/3ML injection pen, INJECT 0.5 MG UNDER THE SKIN EVERY 7 DAYS, Disp: 9 mL, Rfl: 1    Qvar RediHaler 40 MCG/ACT inhaler, Inhale 2 puffs 2 (two) times a day, Disp: , Rfl:     rosuvastatin (CRESTOR) 20 MG tablet, TAKE 1 TABLET DAILY, Disp: 90 tablet, Rfl: 3    venlafaxine (EFFEXOR-XR) 150 mg 24 hr capsule, Take 1 capsule (150 mg total) by mouth every other day, Disp: 45 capsule, Rfl: 1    venlafaxine (EFFEXOR-XR) 75 mg 24 hr capsule, Take 1 capsule (75 mg total) by mouth every other day, Disp: 45 capsule, Rfl: 1      Wt Readings from Last 3 Encounters:   12/30/24 67.1 kg (148 lb)   12/12/24 65.2 kg (143 lb 12.8 oz)   10/22/24 67.9 kg (149 lb 9.6 oz)     Temp Readings from Last 3 Encounters:   05/21/24 97.9 °F (36.6 °C) (Tympanic)   05/13/24 98.7 °F (37.1 °C) (Tympanic)   12/04/23 97.8 °F (36.6 °C) (Tympanic)     BP Readings from Last 3 Encounters:   12/30/24 136/66   12/12/24 126/78   10/22/24 148/80     Pulse Readings from Last 3 Encounters:   12/30/24 79   05/21/24 84   05/13/24 94         Physical Exam  HENT:      Head: Atraumatic.      Mouth/Throat:      Mouth: Mucous membranes are moist.   Eyes:      Extraocular Movements: Extraocular movements intact.   Cardiovascular:      Rate and Rhythm: Normal rate and regular rhythm.   Pulmonary:      Effort: Pulmonary effort is normal.   Abdominal:      General: Abdomen is flat.      Palpations: Abdomen is soft.   Musculoskeletal:         General: Normal range of motion.      Cervical back: Normal range of motion.   Skin:     General: Skin is warm.   Neurological:      General: No focal deficit present.      Mental Status: She is alert and oriented to person, place, and time.   Psychiatric:         Mood and Affect: Mood normal.         Behavior: Behavior normal.           Laboratory Studies:  CMP:  Lab Results   Component Value Date    K 4.6 12/12/2024    CL 99 12/12/2024    CO2 24 12/12/2024    BUN 14 12/12/2024    CREATININE 0.80 12/12/2024    AST 73  "(H) 12/12/2024    ALT 48 (H) 12/12/2024    EGFR 77 12/12/2024       Lipid Profile:   No results found for: \"CHOL\"  Lab Results   Component Value Date    HDL 57 12/12/2024     Lab Results   Component Value Date    LDLCALC 134 (H) 12/12/2024     Lab Results   Component Value Date    TRIG 208 (H) 12/12/2024       Cardiac testing:   EKG reviewed personally: MARSHA Francois          "

## 2025-01-16 ENCOUNTER — TELEPHONE (OUTPATIENT)
Dept: ADMINISTRATIVE | Facility: OTHER | Age: 75
End: 2025-01-16

## 2025-01-16 NOTE — TELEPHONE ENCOUNTER
01/16/25 1:07 PM    Patient contacted to bring Advance Directive, POLST, or Living Will document to next scheduled pcp visit.[facility] Department spoke with patient/ caregiver.    Thank you.  Junior Owens MA  [facility]

## 2025-01-19 ENCOUNTER — NURSE TRIAGE (OUTPATIENT)
Dept: OTHER | Facility: OTHER | Age: 75
End: 2025-01-19

## 2025-01-19 NOTE — TELEPHONE ENCOUNTER
"Difficulty urinating, burning urination; pain right side of back \"I can barely walk.\" Pain has been present since Friday. Patient tried water and cranberry juice but not feeling better. No fever or chills.     Patient states has had UTI's in past. Pain in back in constant moderate and severe.   Reason for Disposition   Patient sounds very sick or weak to the triager    Answer Assessment - Initial Assessment Questions  1. SYMPTOM: \"What's the main symptom you're concerned about?\" (e.g., frequency, incontinence)    Burning urination, difficulty urinating, back pain         2. ONSET: \"When did the  symptoms  start?\"    Friday        3. PAIN: \"Is there any pain?\" If Yes, ask: \"How bad is it?\" (Scale: 1-10; mild, moderate, severe)    Moderate to severe right flank pain           4. CAUSE: \"What do you think is causing the symptoms?\"    UTI        5. OTHER SYMPTOMS: \"Do you have any other symptoms?\" (e.g., blood in urine, fever, flank pain, pain with urination)          No fever or chills  Can hardly walk due to pain    Protocols used: Urinary Symptoms-Adult-AH    "

## 2025-01-20 ENCOUNTER — OFFICE VISIT (OUTPATIENT)
Dept: FAMILY MEDICINE CLINIC | Facility: CLINIC | Age: 75
End: 2025-01-20
Payer: COMMERCIAL

## 2025-01-20 ENCOUNTER — TELEPHONE (OUTPATIENT)
Age: 75
End: 2025-01-20

## 2025-01-20 VITALS
TEMPERATURE: 97.5 F | SYSTOLIC BLOOD PRESSURE: 109 MMHG | OXYGEN SATURATION: 95 % | HEART RATE: 73 BPM | BODY MASS INDEX: 24.68 KG/M2 | WEIGHT: 143.8 LBS | DIASTOLIC BLOOD PRESSURE: 59 MMHG

## 2025-01-20 DIAGNOSIS — I10 ESSENTIAL HYPERTENSION: ICD-10-CM

## 2025-01-20 DIAGNOSIS — F33.9 RECURRENT MAJOR DEPRESSIVE DISORDER, REMISSION STATUS UNSPECIFIED (HCC): ICD-10-CM

## 2025-01-20 DIAGNOSIS — R56.9 SEIZURE (HCC): ICD-10-CM

## 2025-01-20 DIAGNOSIS — E11.9 TYPE 2 DIABETES MELLITUS WITHOUT COMPLICATION, WITHOUT LONG-TERM CURRENT USE OF INSULIN (HCC): Primary | ICD-10-CM

## 2025-01-20 DIAGNOSIS — E78.2 MIXED HYPERLIPIDEMIA: ICD-10-CM

## 2025-01-20 DIAGNOSIS — K21.9 GASTROESOPHAGEAL REFLUX DISEASE, UNSPECIFIED WHETHER ESOPHAGITIS PRESENT: ICD-10-CM

## 2025-01-20 DIAGNOSIS — R82.998 LEUKOCYTES IN URINE: ICD-10-CM

## 2025-01-20 DIAGNOSIS — E78.5 HYPERLIPIDEMIA, UNSPECIFIED HYPERLIPIDEMIA TYPE: ICD-10-CM

## 2025-01-20 DIAGNOSIS — E87.6 HYPOKALEMIA: ICD-10-CM

## 2025-01-20 DIAGNOSIS — R30.0 DYSURIA: ICD-10-CM

## 2025-01-20 DIAGNOSIS — D80.1 HYPOGAMMAGLOBULINEMIA (HCC): ICD-10-CM

## 2025-01-20 DIAGNOSIS — E03.9 HYPOTHYROIDISM, UNSPECIFIED TYPE: ICD-10-CM

## 2025-01-20 DIAGNOSIS — I20.1 CORONARY VASOSPASM (HCC): ICD-10-CM

## 2025-01-20 LAB
SL AMB  POCT GLUCOSE, UA: NEGATIVE
SL AMB LEUKOCYTE ESTERASE,UA: ABNORMAL
SL AMB POCT BILIRUBIN,UA: NEGATIVE
SL AMB POCT BLOOD,UA: NEGATIVE
SL AMB POCT CLARITY,UA: ABNORMAL
SL AMB POCT COLOR,UA: ABNORMAL
SL AMB POCT KETONES,UA: NEGATIVE
SL AMB POCT NITRITE,UA: NEGATIVE
SL AMB POCT PH,UA: 7
SL AMB POCT SPECIFIC GRAVITY,UA: 1.02
SL AMB POCT URINE PROTEIN: ABNORMAL
SL AMB POCT UROBILINOGEN: ABNORMAL

## 2025-01-20 PROCEDURE — G2211 COMPLEX E/M VISIT ADD ON: HCPCS | Performed by: FAMILY MEDICINE

## 2025-01-20 PROCEDURE — 81002 URINALYSIS NONAUTO W/O SCOPE: CPT | Performed by: FAMILY MEDICINE

## 2025-01-20 PROCEDURE — 99214 OFFICE O/P EST MOD 30 MIN: CPT | Performed by: FAMILY MEDICINE

## 2025-01-20 RX ORDER — AMLODIPINE BESYLATE 10 MG/1
10 TABLET ORAL DAILY
Qty: 100 TABLET | Refills: 1 | Status: SHIPPED | OUTPATIENT
Start: 2025-01-20

## 2025-01-20 RX ORDER — ROSUVASTATIN CALCIUM 20 MG/1
20 TABLET, COATED ORAL DAILY
Qty: 90 TABLET | Refills: 3 | Status: SHIPPED | OUTPATIENT
Start: 2025-01-20

## 2025-01-20 RX ORDER — NITROFURANTOIN 25; 75 MG/1; MG/1
100 CAPSULE ORAL 2 TIMES DAILY
Qty: 10 CAPSULE | Refills: 0 | Status: SHIPPED | OUTPATIENT
Start: 2025-01-20 | End: 2025-01-25

## 2025-01-20 RX ORDER — HYDROCHLOROTHIAZIDE 25 MG/1
25 TABLET ORAL DAILY
Qty: 90 TABLET | Refills: 1 | Status: SHIPPED | OUTPATIENT
Start: 2025-01-20

## 2025-01-20 RX ORDER — POTASSIUM CHLORIDE 1500 MG/1
20 TABLET, EXTENDED RELEASE ORAL DAILY
Qty: 90 TABLET | Refills: 3 | Status: SHIPPED | OUTPATIENT
Start: 2025-01-20

## 2025-01-20 RX ORDER — LEVOTHYROXINE SODIUM 88 UG/1
88 TABLET ORAL DAILY
Qty: 90 TABLET | Refills: 3 | Status: SHIPPED | OUTPATIENT
Start: 2025-01-20

## 2025-01-20 RX ORDER — VENLAFAXINE HYDROCHLORIDE 75 MG/1
75 CAPSULE, EXTENDED RELEASE ORAL EVERY OTHER DAY
Qty: 45 CAPSULE | Refills: 1 | Status: SHIPPED | OUTPATIENT
Start: 2025-01-20

## 2025-01-20 RX ORDER — VENLAFAXINE HYDROCHLORIDE 150 MG/1
150 CAPSULE, EXTENDED RELEASE ORAL EVERY OTHER DAY
Qty: 45 CAPSULE | Refills: 1 | Status: SHIPPED | OUTPATIENT
Start: 2025-01-20

## 2025-01-20 RX ORDER — DEXLANSOPRAZOLE 60 MG/1
60 CAPSULE, DELAYED RELEASE ORAL DAILY
Qty: 30 CAPSULE | Refills: 0 | Status: SHIPPED | OUTPATIENT
Start: 2025-01-20

## 2025-01-20 RX ORDER — METFORMIN HYDROCHLORIDE 500 MG/1
1000 TABLET, EXTENDED RELEASE ORAL DAILY
Qty: 90 TABLET | Refills: 7 | Status: SHIPPED | OUTPATIENT
Start: 2025-01-20

## 2025-01-20 RX ORDER — EZETIMIBE 10 MG/1
10 TABLET ORAL DAILY
Start: 2025-01-20

## 2025-01-20 RX ORDER — SEMAGLUTIDE 0.68 MG/ML
0.5 INJECTION, SOLUTION SUBCUTANEOUS
Qty: 9 ML | Refills: 1 | Status: SHIPPED | OUTPATIENT
Start: 2025-01-20

## 2025-01-20 RX ORDER — LOSARTAN POTASSIUM 100 MG/1
100 TABLET ORAL DAILY
Qty: 90 TABLET | Refills: 1 | Status: SHIPPED | OUTPATIENT
Start: 2025-01-20

## 2025-01-20 NOTE — ASSESSMENT & PLAN NOTE
Depression Screening Follow-up Plan: Patient's depression screening was positive with a PHQ-9 score of 7. Patient assessed for underlying major depression. They have no active suicidal ideations. Brief counseling provided and recommend additional follow-up/re-evaluation next office visit.    She is on effexor xr 75 along with 150 mg daily.   PHQ9 score today is a 7. She feels as though she is doing well.     Orders:    venlafaxine (EFFEXOR-XR) 150 mg 24 hr capsule; Take 1 capsule (150 mg total) by mouth every other day    venlafaxine (EFFEXOR-XR) 75 mg 24 hr capsule; Take 1 capsule (75 mg total) by mouth every other day

## 2025-01-20 NOTE — ASSESSMENT & PLAN NOTE
Reviewed labs completed on 12/12/24    Her A1c is 6.7 down from 7.5 on prior labs.   Continue metformin XR and ozempic  She is having some decreased appetite which I suspect is related to the ozempic so could consider decreasing dose to the 0.25 mg dose if she continues with side effects of decreased appetite alma since her A1c is so good.   Recheck labs prior to next OV in 5 months.   Lab Results   Component Value Date    HGBA1C 6.7 (H) 12/12/2024       Orders:    Albumin / creatinine urine ratio    metFORMIN (GLUCOPHAGE-XR) 500 mg 24 hr tablet; Take 2 tablets (1,000 mg total) by mouth daily    Ozempic, 0.25 or 0.5 MG/DOSE, 2 MG/3ML injection pen; Inject 0.75 mL (0.5 mg total) under the skin every 7 days    Hemoglobin A1C; Future    Comprehensive metabolic panel; Future

## 2025-01-20 NOTE — ASSESSMENT & PLAN NOTE
"Lab Results   Component Value Date    CHOLESTEROL 228 (H) 12/12/2024    CHOLESTEROL 154 11/28/2023    CHOLESTEROL 185 07/12/2023     Lab Results   Component Value Date    HDL 57 12/12/2024    HDL 53 11/28/2023    HDL 59 07/12/2023     Lab Results   Component Value Date    TRIG 208 (H) 12/12/2024    TRIG 174 (H) 11/28/2023    TRIG 106 07/12/2023     No results found for: \"NONHDLC\"  LDL on recent labs was 134. On crestor 20 but was not taking at the time of labs.   Cardiology had added zetia 10 when he saw her in December. She is not taking it.     Orders:    ezetimibe (ZETIA) 10 mg tablet; Take 1 tablet (10 mg total) by mouth daily    Lipid Panel with Direct LDL reflex; Future    "

## 2025-01-20 NOTE — ASSESSMENT & PLAN NOTE
Hospitalized at [facility] for altered mental status 2023  Saw neurology in consultation 1/16/2024 (Dr Calloway at [place]).   Etiology of her episode is unclear at this moment: Seizure vs. TIA vs. Worsening psychiatric issues vs. Metabolic encephalopathy due Hyponatremia etc..     Per neurology consultation on 5/23/24,     She can drive again if she is seizure-free for 6 months   - I counseled her risk factors lowering seizure thresholds (sleep deprivation, illness/fever, metabolic imbalance and/or alcohol)  - Patient will return in 12 months for follow-up, or sooner as needed

## 2025-01-20 NOTE — PROGRESS NOTES
Diabetic Foot Exam    Patient's shoes and socks removed.    Right Foot/Ankle   Right Foot Inspection  Skin Exam: skin normal, skin intact and dry skin. No warmth, no callus, no erythema, no maceration, no abnormal color, no pre-ulcer, no ulcer and no callus.     Toe Exam: ROM and strength within normal limits. No swelling, no tenderness, erythema and  no right toe deformity    Sensory   Vibration: intact  Proprioception: intact  Monofilament testing: intact    Vascular  Capillary refills: < 3 seconds  The right DP pulse is 1+. The right PT pulse is 1+.     Left Foot/Ankle  Left Foot Inspection  Skin Exam: skin normal, skin intact and dry skin. No warmth, no erythema, no maceration, normal color, no pre-ulcer, no ulcer and no callus.     Toe Exam: ROM and strength within normal limits. No swelling, no tenderness, no erythema and no left toe deformity.     Sensory   Vibration: intact  Proprioception: intact  Monofilament testing: intact    Vascular  Capillary refills: < 3 seconds  The left DP pulse is 1+. The left PT pulse is 1+.     Assign Risk Category  No deformity present  No loss of protective sensation  No weak pulses  Risk: 0

## 2025-01-20 NOTE — PROGRESS NOTES
"Name: Viv Corona      : 1950      MRN: [id number]  Encounter Provider: Jemma Dailey DO  Encounter Date: 2025   Encounter department: [facility] PRIMARY CARE    Assessment & Plan  Type 2 diabetes mellitus without complication, without long-term current use of insulin (formerly [facility])  Reviewed labs completed on 24    Her A1c is 6.7 down from 7.5 on prior labs.   Continue metformin XR and ozempic  She is having some decreased appetite which I suspect is related to the ozempic so could consider decreasing dose to the 0.25 mg dose if she continues with side effects of decreased appetite alma since her A1c is so good.   Recheck labs prior to next OV in 5 months.   Lab Results   Component Value Date    HGBA1C 6.7 (H) 2024       Orders:    Albumin / creatinine urine ratio    metFORMIN (GLUCOPHAGE-XR) 500 mg 24 hr tablet; Take 2 tablets (1,000 mg total) by mouth daily    Ozempic, 0.25 or 0.5 MG/DOSE, 2 MG/3ML injection pen; Inject 0.75 mL (0.5 mg total) under the skin every 7 days    Hemoglobin A1C; Future    Comprehensive metabolic panel; Future    Hypothyroidism, unspecified type  Reviewed labs done 24. Her TSH was normal at 0.755.   Should continue same dose of levothyroxine  Refilled her levothyroxine    Orders:    levothyroxine 88 mcg tablet; Take 1 tablet (88 mcg total) by mouth daily    TSH, 3rd generation with Free T4 reflex; Future    Mixed hyperlipidemia  Lab Results   Component Value Date    CHOLESTEROL 228 (H) 2024    CHOLESTEROL 154 2023    CHOLESTEROL 185 2023     Lab Results   Component Value Date    HDL 57 2024    HDL 53 2023    HDL 59 2023     Lab Results   Component Value Date    TRIG 208 (H) 2024    TRIG 174 (H) 2023    TRIG 106 2023     No results found for: \"NONHDLC\"  LDL on recent labs was 134. On crestor 20 but was not taking at the time of labs.   Cardiology had added zetia 10 when he saw her in " "December. She is not taking it.     Orders:    ezetimibe (ZETIA) 10 mg tablet; Take 1 tablet (10 mg total) by mouth daily    Lipid Panel with Direct LDL reflex; Future    Seizure (HCC)  Hospitalized at [facility] for altered mental status 2023  Saw neurology in consultation 1/16/2024 (Dr Calloway at [place]).   Etiology of her episode is unclear at this moment: Seizure vs. TIA vs. Worsening psychiatric issues vs. Metabolic encephalopathy due Hyponatremia etc..     Per neurology consultation on 5/23/24,     She can drive again if she is seizure-free for 6 months   - I counseled her risk factors lowering seizure thresholds (sleep deprivation, illness/fever, metabolic imbalance and/or alcohol)  - Patient will return in 12 months for follow-up, or sooner as needed          Coronary vasospasm (HCC)  Patient saw cardiology in consultation in December.     \"likely in setting of coronary angiography 20 yrs ago. This can occur as a result of placing catheter in coronary artery. No further symptoms. No suggestion of obstructive CAD. No further testing. \"         Recurrent major depressive disorder, remission status unspecified (HCC)  Depression Screening Follow-up Plan: Patient's depression screening was positive with a PHQ-9 score of 7. Patient assessed for underlying major depression. They have no active suicidal ideations. Brief counseling provided and recommend additional follow-up/re-evaluation next office visit.    She is on effexor xr 75 along with 150 mg daily.   PHQ9 score today is a 7. She feels as though she is doing well.     Orders:    venlafaxine (EFFEXOR-XR) 150 mg 24 hr capsule; Take 1 capsule (150 mg total) by mouth every other day    venlafaxine (EFFEXOR-XR) 75 mg 24 hr capsule; Take 1 capsule (75 mg total) by mouth every other day    Hypogammaglobulinemia (HCC)  Per pulmonology consultation note dated 4/29/20 which was reviewed by me after receipt of transferred records on 3/11/22.   IgG deficiency  Pulmonology " "discussed benefits and risks of immunoglobulin therapy.   Decided on close observation, treating infections early (by PCP or pulmonology) and if hospitalized for pneumonia, consider IV IgG         Dysuria    Orders:    POCT urine dip    nitrofurantoin (MACROBID) 100 mg capsule; Take 1 capsule (100 mg total) by mouth 2 (two) times a day for 5 days  urine in office today showed no blood or nitrates. There was a trace of leukocytes but not sure that this is causing her pain.   Send for culture  Empirically treat with macrobid    Essential hypertension  BP at goal today  Orders:    amLODIPine (NORVASC) 10 mg tablet; Take 1 tablet (10 mg total) by mouth daily    hydroCHLOROthiazide 25 mg tablet; Take 1 tablet (25 mg total) by mouth daily    losartan (COZAAR) 100 MG tablet; Take 1 tablet (100 mg total) by mouth daily    Gastroesophageal reflux disease, unspecified whether esophagitis present  Stable on dexilant  Orders:    dexlansoprazole (DEXILANT) 60 MG capsule; Take 1 capsule (60 mg total) by mouth daily    Hypokalemia    Orders:    potassium chloride (Klor-Con M20) 20 mEq tablet; Take 1 tablet (20 mEq total) by mouth daily    Hyperlipidemia, unspecified hyperlipidemia type  Lab Results   Component Value Date    CHOLESTEROL 228 (H) 12/12/2024    CHOLESTEROL 154 11/28/2023    CHOLESTEROL 185 07/12/2023     Lab Results   Component Value Date    HDL 57 12/12/2024    HDL 53 11/28/2023    HDL 59 07/12/2023     Lab Results   Component Value Date    TRIG 208 (H) 12/12/2024    TRIG 174 (H) 11/28/2023    TRIG 106 07/12/2023     No results found for: \"NONHDLC\"  LDL on recent labs was 134. Not at goal.   On crestor 20 but was not taking when she had labs.   Cardiology had added zetia 10 when he saw her in December. She is not taking and prefers to hold off until she sees labs next time when she is in fact on the crestor.     Orders:    rosuvastatin (CRESTOR) 20 MG tablet; Take 1 tablet (20 mg total) by mouth daily    Leukocytes " in urine    Orders:    Urine culture    nitrofurantoin (MACROBID) 100 mg capsule; Take 1 capsule (100 mg total) by mouth 2 (two) times a day for 5 days         History of Present Illness     HPI  Patient is a 74 year old female with hypertension, hyperlipidemia, DM2, hypothyroidism, depression, alcohol abuse, GERD, NAFLD, seizure disorder, sarcoidosis being seen today for f/u on her chronic conditions and review of labs.     Saw  cardiology in consultation in December after having had episode of altered mental status with diagnosis of coronary atherosclerosis.   Felt that she may have had episode of coronary vasospasm related to her angiography. No further testing recommended by cardiology  They did put her on zetia but she has not started yet.     Is complaining of burning with urination, urinary urgency, chills and fatigue. Started 3 days ago. Feels similar to previous bladder infections. Some low back pain. No fever.     She changed pharmacies and needs all rx sent to mail in pharmacy today.         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 bland. recall 5 years     Past Surgical History:   Procedure Laterality Date    BREAST  BIOPSY Right     benign    CARDIAC CATHETERIZATION      CATARACT EXTRACTION, BILATERAL      COLONOSCOPY      CORNEA LACERATION REPAIR Right     EYE SURGERY  10 years ago    REFRACTIVE SURGERY      TONSILLECTOMY      UPPER GASTROINTESTINAL ENDOSCOPY      WISDOM TOOTH EXTRACTION       Family History   Problem Relation Age of Onset    Heart disease Mother     Heart disease Father     Stroke Sister     Heart disease Sister     Lung cancer Sister 65    Cancer Sister     No Known Problems Daughter     No Known Problems Maternal Grandmother     No Known Problems Maternal Grandfather     No Known Problems Paternal Grandmother     No Known Problems Paternal Grandfather     Heart disease Brother     No Known Problems Maternal Aunt     No Known Problems Maternal Aunt     No Known Problems Maternal Aunt     No Known Problems Maternal Aunt     No Known Problems Paternal Aunt     No Known Problems Paternal Aunt     Colon polyps Neg Hx     Colon cancer Neg Hx      Social History     Tobacco Use    Smoking status: Former     Current packs/day: 0.00     Average packs/day: 0.3 packs/day for 25.2 years (6.3 ttl pk-yrs)     Types: Cigarettes     Start date: 1961     Quit date:      Years since quittin.0     Passive exposure: Past    Smokeless tobacco: Never   Vaping Use    Vaping status: Never Used   Substance and Sexual Activity    Alcohol use: Not Currently     Alcohol/week: 14.0 standard drinks of alcohol     Types: 14 Glasses of wine per week     Comment: Only one or two drinks if out for dinner    Drug use: Yes     Frequency: 3.0 times per week     Types: Marijuana    Sexual activity: Not Currently     Birth control/protection: None     Current Outpatient Medications on File Prior to Visit   Medication Sig    albuterol (PROVENTIL HFA,VENTOLIN HFA) 90 mcg/act inhaler Inhale 2 puffs 4 (four) times a day    amLODIPine (NORVASC) 10 mg tablet TAKE 1 TABLET DAILY    ascorbic acid (VITAMIN C) 500 mg tablet Take 500 mg by  mouth daily    Biotin 5000 MCG CAPS Take 5,000 mcg by mouth in the morning    budesonide (PULMICORT) 0.5 mg/2 mL nebulizer solution Take 0.5 mg by nebulization as needed in the morning. Rinse mouth after use. .    calcium carbonate (OS-BRITTA) 600 MG tablet Take 600 mg by mouth 2 (two) times a day with meals    cholecalciferol (VITAMIN D3) 1,000 units tablet Take 1,000 Units by mouth daily    dexlansoprazole (DEXILANT) 60 MG capsule Take 1 capsule (60 mg total) by mouth daily    GUAIFENESIN 1200 PO Take 1,500 mg by mouth 2 (two) times a day    hydroCHLOROthiazide 25 mg tablet TAKE 1 TABLET DAILY    ipratropium-albuterol, FOR EMS ONLY, (DUO-NEB) 0.5-2.5 mg/3 mL nebulizer solution Use 3 mL as needed    Klor-Con M20 20 MEQ tablet TAKE 1 TABLET DAILY    levothyroxine 88 mcg tablet TAKE 1 TABLET DAILY    losartan (COZAAR) 100 MG tablet TAKE 1 TABLET DAILY    metFORMIN (GLUCOPHAGE-XR) 500 mg 24 hr tablet TAKE 2 TABLETS DAILY    Ozempic, 0.25 or 0.5 MG/DOSE, 2 MG/3ML injection pen INJECT 0.5 MG UNDER THE SKIN EVERY 7 DAYS    Qvar RediHaler 40 MCG/ACT inhaler Inhale 2 puffs 2 (two) times a day    rosuvastatin (CRESTOR) 20 MG tablet TAKE 1 TABLET DAILY    venlafaxine (EFFEXOR-XR) 150 mg 24 hr capsule Take 1 capsule (150 mg total) by mouth every other day    venlafaxine (EFFEXOR-XR) 75 mg 24 hr capsule Take 1 capsule (75 mg total) by mouth every other day    [DISCONTINUED] ezetimibe (ZETIA) 10 mg tablet Take 1 tablet (10 mg total) by mouth daily (Patient not taking: Reported on 1/20/2025)     Allergies   Allergen Reactions    Epinephrine Shortness Of Breath and Palpitations    Carisoprodol Other (See Comments)     Other reaction(s): SOMA (11/04/2010)      Irbesartan-Hydrochlorothiazide Other (See Comments)     Possible lichen planus    Lisinopril      Lichen planus    Other Other (See Comments)     Other reaction(s): Muscle Relaxers (09/03/2014)      Tizanidine      Altered mental status     Immunization History   Administered  Date(s) Administered    Hep A, adult 03/02/2005    INFLUENZA 02/09/2010, 10/14/2011, 09/28/2016, 10/11/2017    Influenza Split High Dose Preservative Free IM 11/20/2019    Influenza, high dose seasonal 0.7 mL 11/17/2022, 12/04/2023    Pneumococcal Conjugate Vaccine 20-valent (Pcv20), Polysace 05/17/2022    Pneumococcal Polysaccharide PPV23 01/10/2006, 01/01/2014    Td (adult), adsorbed 03/08/2002, 10/13/2003    Tdap 10/06/2010, 07/13/2021     Objective   /59   Pulse 73   Wt 65.2 kg (143 lb 12.8 oz)   LMP  (LMP Unknown)   SpO2 95%   BMI 24.68 kg/m²     Physical Exam  Vitals and nursing note reviewed.   Constitutional:       General: She is not in acute distress.     Appearance: Normal appearance. She is not ill-appearing, toxic-appearing or diaphoretic.   HENT:      Head: Normocephalic and atraumatic.   Eyes:      Conjunctiva/sclera: Conjunctivae normal.   Cardiovascular:      Rate and Rhythm: Normal rate and regular rhythm.      Pulses: Normal pulses.      Heart sounds: No murmur heard.  Pulmonary:      Effort: Pulmonary effort is normal.      Breath sounds: Normal breath sounds.   Abdominal:      General: Abdomen is flat. Bowel sounds are normal. There is no distension.      Palpations: There is no mass.      Tenderness: There is no abdominal tenderness. There is no right CVA tenderness, left CVA tenderness or guarding.   Musculoskeletal:      Cervical back: Normal range of motion.      Right lower leg: No edema.      Left lower leg: No edema.   Neurological:      General: No focal deficit present.      Mental Status: She is alert and oriented to person, place, and time.   Psychiatric:         Mood and Affect: Mood normal.       Depression Screening Follow-up Plan: Patient's depression screening was positive with a PHQ-9 score of 7. Patient assessed for underlying major depression. They have no active suicidal ideations. Brief counseling provided and recommend additional follow-up/re-evaluation next  office visit.

## 2025-01-20 NOTE — TELEPHONE ENCOUNTER
Patient is scheduled to see Dr. Dailey tomorrow. She is in pain due to UTI and is wondering if something can be send to her pharmacy. Please call to advise. I also added her to the cancellation list please reach out to her if someone cancels for today.    Thank you!!

## 2025-01-20 NOTE — ASSESSMENT & PLAN NOTE
Reviewed labs done 12/12/24. Her TSH was normal at 0.755.   Should continue same dose of levothyroxine  Refilled her levothyroxine    Orders:    levothyroxine 88 mcg tablet; Take 1 tablet (88 mcg total) by mouth daily    TSH, 3rd generation with Free T4 reflex; Future

## 2025-01-20 NOTE — ASSESSMENT & PLAN NOTE
Per pulmonology consultation note dated 4/29/20 which was reviewed by me after receipt of transferred records on 3/11/22.   IgG deficiency  Pulmonology discussed benefits and risks of immunoglobulin therapy.   Decided on close observation, treating infections early (by PCP or pulmonology) and if hospitalized for pneumonia, consider IV IgG          4 = No assist / stand by assistance

## 2025-01-20 NOTE — ASSESSMENT & PLAN NOTE
"Patient saw cardiology in consultation in December.     \"likely in setting of coronary angiography 20 yrs ago. This can occur as a result of placing catheter in coronary artery. No further symptoms. No suggestion of obstructive CAD. No further testing. \"         "

## 2025-01-20 NOTE — ASSESSMENT & PLAN NOTE
"Lab Results   Component Value Date    CHOLESTEROL 228 (H) 12/12/2024    CHOLESTEROL 154 11/28/2023    CHOLESTEROL 185 07/12/2023     Lab Results   Component Value Date    HDL 57 12/12/2024    HDL 53 11/28/2023    HDL 59 07/12/2023     Lab Results   Component Value Date    TRIG 208 (H) 12/12/2024    TRIG 174 (H) 11/28/2023    TRIG 106 07/12/2023     No results found for: \"NONHDLC\"  LDL on recent labs was 134. Not at goal.   On crestor 20 but was not taking when she had labs.   Cardiology had added zetia 10 when he saw her in December. She is not taking and prefers to hold off until she sees labs next time when she is in fact on the crestor.     Orders:    rosuvastatin (CRESTOR) 20 MG tablet; Take 1 tablet (20 mg total) by mouth daily    "

## 2025-01-20 NOTE — ASSESSMENT & PLAN NOTE
Stable on dexilant  Orders:    dexlansoprazole (DEXILANT) 60 MG capsule; Take 1 capsule (60 mg total) by mouth daily

## 2025-01-20 NOTE — ASSESSMENT & PLAN NOTE
BP at goal today  Orders:    amLODIPine (NORVASC) 10 mg tablet; Take 1 tablet (10 mg total) by mouth daily    hydroCHLOROthiazide 25 mg tablet; Take 1 tablet (25 mg total) by mouth daily    losartan (COZAAR) 100 MG tablet; Take 1 tablet (100 mg total) by mouth daily

## 2025-01-22 ENCOUNTER — RESULTS FOLLOW-UP (OUTPATIENT)
Dept: FAMILY MEDICINE CLINIC | Facility: CLINIC | Age: 75
End: 2025-01-22

## 2025-01-22 LAB
ALBUMIN/CREAT UR: 35 MG/G CREAT (ref 0–29)
BACTERIA UR CULT: NORMAL
CREAT UR-MCNC: 139.4 MG/DL
Lab: NORMAL
MICROALBUMIN UR-MCNC: 48.8 UG/ML

## 2025-01-22 NOTE — TELEPHONE ENCOUNTER
LMOM for pt to CB, please provide message in Red to patient.        ----- Message from Jemma Dailey DO sent at 1/22/2025  9:46 AM EST -----  Let patient know that her urine culture was negative for infection     (Culture shows less than 10,000 colony forming units of bacteria per   milliliter of urine. This colony count is not generally considered   to be clinically significant   )

## 2025-01-23 NOTE — TELEPHONE ENCOUNTER
Called patient, patient verbalized understanding of results from Dr Dailey.   Patient reports she will stop antibiotic as she was told by Dr Dailey if negative.     No further questions.

## 2025-02-26 ENCOUNTER — TELEPHONE (OUTPATIENT)
Dept: FAMILY MEDICINE CLINIC | Facility: CLINIC | Age: 75
End: 2025-02-26

## 2025-02-26 ENCOUNTER — OFFICE VISIT (OUTPATIENT)
Dept: FAMILY MEDICINE CLINIC | Facility: CLINIC | Age: 75
End: 2025-02-26
Payer: COMMERCIAL

## 2025-02-26 VITALS
WEIGHT: 130.6 LBS | DIASTOLIC BLOOD PRESSURE: 64 MMHG | TEMPERATURE: 97.4 F | SYSTOLIC BLOOD PRESSURE: 102 MMHG | BODY MASS INDEX: 22.42 KG/M2 | HEART RATE: 84 BPM | OXYGEN SATURATION: 94 %

## 2025-02-26 DIAGNOSIS — R05.1 ACUTE COUGH: ICD-10-CM

## 2025-02-26 DIAGNOSIS — U07.1 COVID-19: ICD-10-CM

## 2025-02-26 DIAGNOSIS — J45.901 EXACERBATION OF ASTHMA, UNSPECIFIED ASTHMA SEVERITY, UNSPECIFIED WHETHER PERSISTENT: ICD-10-CM

## 2025-02-26 DIAGNOSIS — R63.4 WEIGHT LOSS: ICD-10-CM

## 2025-02-26 DIAGNOSIS — E11.9 TYPE 2 DIABETES MELLITUS WITHOUT COMPLICATION, WITHOUT LONG-TERM CURRENT USE OF INSULIN (HCC): ICD-10-CM

## 2025-02-26 DIAGNOSIS — J18.9 PNEUMONIA OF LEFT LOWER LOBE DUE TO INFECTIOUS ORGANISM: Primary | ICD-10-CM

## 2025-02-26 DIAGNOSIS — K21.9 GASTROESOPHAGEAL REFLUX DISEASE, UNSPECIFIED WHETHER ESOPHAGITIS PRESENT: ICD-10-CM

## 2025-02-26 LAB
SARS-COV-2 AG UPPER RESP QL IA: NEGATIVE
SL AMB POCT RAPID FLU A: NEGATIVE
SL AMB POCT RAPID FLU B: NEGATIVE
VALID CONTROL: NORMAL

## 2025-02-26 PROCEDURE — 87804 INFLUENZA ASSAY W/OPTIC: CPT | Performed by: FAMILY MEDICINE

## 2025-02-26 PROCEDURE — 87811 SARS-COV-2 COVID19 W/OPTIC: CPT | Performed by: FAMILY MEDICINE

## 2025-02-26 PROCEDURE — 99214 OFFICE O/P EST MOD 30 MIN: CPT | Performed by: FAMILY MEDICINE

## 2025-02-26 PROCEDURE — G2211 COMPLEX E/M VISIT ADD ON: HCPCS | Performed by: FAMILY MEDICINE

## 2025-02-26 RX ORDER — DOXYCYCLINE HYCLATE 100 MG
100 TABLET ORAL 2 TIMES DAILY
Qty: 14 TABLET | Refills: 0 | Status: SHIPPED | OUTPATIENT
Start: 2025-02-26 | End: 2025-03-05

## 2025-02-26 RX ORDER — DEXLANSOPRAZOLE 60 MG/1
60 CAPSULE, DELAYED RELEASE ORAL DAILY
Qty: 100 CAPSULE | Refills: 1 | Status: SHIPPED | OUTPATIENT
Start: 2025-02-26

## 2025-02-26 NOTE — PROGRESS NOTES
Name: Viv Corona      : 1950      MRN: [id number]  Encounter Provider: Jemma Dailey DO  Encounter Date: 2025   Encounter department: [facility] PRIMARY CARE    Assessment & Plan  Pneumonia of left lower lobe due to infectious organism  Some rales in left base  Rapid flu in office was negative  Her covid test was negative at the 15 minute ezequiel but came up positive after she left office. I am reading that this could be false positive  Will check CXR.   She was given rx for antibiotics at time of her office visit discharge (augmentin and doxycycline)   Staff did reach out to patient regarding the ? + covid test. Await call back.   Should ideally have a covid test sent out but she is at the 5 day ezequiel right now so don't want to wait around for that to result.   If she does decide to take paxlovid, should hold the crestor and take 1/2 of her amlodipine as bp low to begin with and paxlovid can further decrease bp when in combo with calcium channel blocke.       Recheck in 1 week.     To ED if sx worsen in severity  Orders:    doxycycline hyclate (VIBRA-TABS) 100 mg tablet; Take 1 tablet (100 mg total) by mouth 2 (two) times a day for 7 days    amoxicillin-clavulanate (AUGMENTIN) 875-125 mg per tablet; Take 1 tablet by mouth every 12 (twelve) hours for 7 days    Acute cough    Orders:    Poct Covid 19 Rapid Antigen Test    POCT rapid flu A and B    XR chest pa and lateral; Future    Exacerbation of asthma, unspecified asthma severity, unspecified whether persistent         Weight loss  Patient has lost 18 lbs in the last 2 months. She is taking ozempic 0.5 mg weekly along with metformin xr 1000 mg for her diabetes.   Suspect related to her ozempic but on this for  years so not sure why all of  a sudden she is losing weight.   Colonoscopy is up to date. Was completed 3/1/23. Advised 5 year recall due to history of polyps.   Mammogram up to date.   TSH normal on recent labs.     Will  check CBC, repeat TSH and check cmp  CXR ordered for evaluation of her acute illness.   If xray and labs unremarkable, will need further workup for this weight loss with CT scan of chest, abdomen and pelvis.     Orders:    CBC and differential    Comprehensive metabolic panel    TSH, 3rd generation with Free T4 reflex    XR chest pa and lateral; Future    Type 2 diabetes mellitus without complication, without long-term current use of insulin (HCC)  On ozempic 0.5 mg along with metformin XR 1000 mg per day for her diabetes   Has lost 18 lbs in last 2 months.     Lab Results   Component Value Date    HGBA1C 6.7 (H) 12/12/2024       Orders:    Glucometer test strips    Gastroesophageal reflux disease, unspecified whether esophagitis present    Orders:    dexlansoprazole (DEXILANT) 60 MG capsule; Take 1 capsule (60 mg total) by mouth daily         History of Present Illness     HPI  Patient is a 74 year old female with hypertension, hyperlipidemia, hypothyroidism,  DM2, depression, alcohol abuse, seizure, cough variant asthma and sarcoidosis who is being seen today for complaint of fever, cough and congestion that started 5 days ago. Onset of sx was abrupt.   Tmax 102.   Feels achey and tired.   Mild headache  Cough is productive   Feels short of breath    Daughter recently sick with similar sx.   She is using both her inhaler and nebulizer  Has not had any advil or tylenol today     She is also concerned about her weight loss. Has lost 18 lbs since December.   She has been on ozempic for years and weight has always been pretty stable.   She denies any nausea or vomiting.   No abdominal pain   Does have occasional diarrhea.   She had colonoscopy in 2023 and this was normal.   Her mammogram is up to date--done 9/23/24.   She has hypothyroidism but last TSH in December was normal.   Not feeling depressed.     Follows with pulmonology, Dr Rojas for her asthma and sarcoidosis and is overdue for visit.     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 bland. recall 5 years     Past Surgical History:   Procedure Laterality Date    BREAST BIOPSY Right     benign    CARDIAC CATHETERIZATION      CATARACT EXTRACTION, BILATERAL      COLONOSCOPY      CORNEA LACERATION REPAIR Right     EYE SURGERY  10 years ago    REFRACTIVE SURGERY      TONSILLECTOMY      UPPER GASTROINTESTINAL ENDOSCOPY      WISDOM TOOTH EXTRACTION       Family History   Problem Relation Age of Onset    Heart disease Mother     Heart disease Father     Stroke Sister     Heart disease Sister     Lung cancer Sister 65    Cancer Sister     No Known Problems Daughter     No Known Problems Maternal Grandmother     No Known Problems Maternal Grandfather     No Known Problems Paternal Grandmother     No Known Problems Paternal Grandfather     Heart disease Brother     No Known Problems Maternal Aunt     No Known Problems Maternal Aunt     No Known Problems Maternal Aunt     No Known Problems Maternal Aunt     No Known Problems Paternal Aunt     No Known Problems Paternal Aunt     Colon polyps Neg Hx     Colon cancer Neg Hx      Social History     Tobacco  Use    Smoking status: Former     Current packs/day: 0.00     Average packs/day: 0.3 packs/day for 25.2 years (6.3 ttl pk-yrs)     Types: Cigarettes     Start date: 1961     Quit date:      Years since quittin.1     Passive exposure: Past    Smokeless tobacco: Never   Vaping Use    Vaping status: Never Used   Substance and Sexual Activity    Alcohol use: Not Currently     Alcohol/week: 14.0 standard drinks of alcohol     Types: 14 Glasses of wine per week     Comment: Only one or two drinks if out for dinner    Drug use: Yes     Frequency: 3.0 times per week     Types: Marijuana    Sexual activity: Not Currently     Birth control/protection: None     Current Outpatient Medications on File Prior to Visit   Medication Sig    albuterol (PROVENTIL HFA,VENTOLIN HFA) 90 mcg/act inhaler Inhale 2 puffs 4 (four) times a day    amLODIPine (NORVASC) 10 mg tablet Take 1 tablet (10 mg total) by mouth daily    ascorbic acid (VITAMIN C) 500 mg tablet Take 500 mg by mouth daily    Biotin 5000 MCG CAPS Take 5,000 mcg by mouth in the morning    budesonide (PULMICORT) 0.5 mg/2 mL nebulizer solution Take 0.5 mg by nebulization as needed in the morning. Rinse mouth after use. .    calcium carbonate (OS-BRITTA) 600 MG tablet Take 600 mg by mouth 2 (two) times a day with meals    cholecalciferol (VITAMIN D3) 1,000 units tablet Take 1,000 Units by mouth daily    dexlansoprazole (DEXILANT) 60 MG capsule Take 1 capsule (60 mg total) by mouth daily    ezetimibe (ZETIA) 10 mg tablet Take 1 tablet (10 mg total) by mouth daily    GUAIFENESIN 1200 PO Take 1,500 mg by mouth 2 (two) times a day    hydroCHLOROthiazide 25 mg tablet Take 1 tablet (25 mg total) by mouth daily    ipratropium-albuterol, FOR EMS ONLY, (DUO-NEB) 0.5-2.5 mg/3 mL nebulizer solution Use 3 mL as needed    levothyroxine 88 mcg tablet Take 1 tablet (88 mcg total) by mouth daily    losartan (COZAAR) 100 MG tablet Take 1 tablet (100 mg total) by mouth daily    metFORMIN  (GLUCOPHAGE-XR) 500 mg 24 hr tablet Take 2 tablets (1,000 mg total) by mouth daily    Ozempic, 0.25 or 0.5 MG/DOSE, 2 MG/3ML injection pen Inject 0.75 mL (0.5 mg total) under the skin every 7 days    potassium chloride (Klor-Con M20) 20 mEq tablet Take 1 tablet (20 mEq total) by mouth daily    Qvar RediHaler 40 MCG/ACT inhaler Inhale 2 puffs 2 (two) times a day    rosuvastatin (CRESTOR) 20 MG tablet Take 1 tablet (20 mg total) by mouth daily    venlafaxine (EFFEXOR-XR) 150 mg 24 hr capsule Take 1 capsule (150 mg total) by mouth every other day    venlafaxine (EFFEXOR-XR) 75 mg 24 hr capsule Take 1 capsule (75 mg total) by mouth every other day     Allergies   Allergen Reactions    Epinephrine Shortness Of Breath and Palpitations    Carisoprodol Other (See Comments)     Other reaction(s): SOMA (11/04/2010)      Irbesartan-Hydrochlorothiazide Other (See Comments)     Possible lichen planus    Lisinopril      Lichen planus    Other Other (See Comments)     Other reaction(s): Muscle Relaxers (09/03/2014)      Tizanidine      Altered mental status     Immunization History   Administered Date(s) Administered    Hep A, adult 03/02/2005    INFLUENZA 02/09/2010, 10/14/2011, 09/28/2016, 10/11/2017    Influenza Split High Dose Preservative Free IM 11/20/2019    Influenza, high dose seasonal 0.7 mL 11/17/2022, 12/04/2023    Pneumococcal Conjugate Vaccine 20-valent (Pcv20), Polysace 05/17/2022    Pneumococcal Polysaccharide PPV23 01/10/2006, 01/01/2014    Td (adult), adsorbed 03/08/2002, 10/13/2003    Tdap 10/06/2010, 07/13/2021     Objective   LMP  (LMP Unknown)     Physical Exam  Vitals and nursing note reviewed.   Constitutional:       General: She is not in acute distress.     Appearance: Normal appearance. She is ill-appearing. She is not toxic-appearing or diaphoretic.   HENT:      Head: Normocephalic and atraumatic.      Right Ear: Tympanic membrane normal.      Left Ear: Tympanic membrane normal.      Nose: Nose normal.  No rhinorrhea.      Mouth/Throat:      Mouth: Mucous membranes are dry.      Pharynx: No posterior oropharyngeal erythema.   Eyes:      Conjunctiva/sclera: Conjunctivae normal.   Cardiovascular:      Rate and Rhythm: Normal rate and regular rhythm.      Heart sounds: No murmur heard.  Pulmonary:      Effort: Pulmonary effort is normal. No respiratory distress.      Breath sounds: Rales (left base) present. No wheezing.   Abdominal:      General: Abdomen is flat. Bowel sounds are normal.      Palpations: Abdomen is soft.   Musculoskeletal:      Cervical back: Normal range of motion and neck supple.      Right lower leg: No edema.      Left lower leg: No edema.   Lymphadenopathy:      Cervical: No cervical adenopathy.   Skin:     General: Skin is warm and dry.      Findings: No rash.   Neurological:      General: No focal deficit present.      Mental Status: She is alert and oriented to person, place, and time.   Psychiatric:         Mood and Affect: Mood normal.

## 2025-02-26 NOTE — TELEPHONE ENCOUNTER
----- Message from Jemma Dailey, DO sent at 2/26/2025  3:44 PM EST -----  Call patient to let her know that covid test turned up positive just after she left office.   In that situation since it is viral. No need for the antibiotics. Still want her to have cxr today  I will call with that result.   She is eligible for tx with paxlovid if she is interested.   Let me know and I can send....

## 2025-02-26 NOTE — ASSESSMENT & PLAN NOTE
Orders:    dexlansoprazole (DEXILANT) 60 MG capsule; Take 1 capsule (60 mg total) by mouth daily

## 2025-02-26 NOTE — ASSESSMENT & PLAN NOTE
On ozempic 0.5 mg along with metformin XR 1000 mg per day for her diabetes   Has lost 18 lbs in last 2 months.     Lab Results   Component Value Date    HGBA1C 6.7 (H) 12/12/2024       Orders:    Glucometer test strips

## 2025-02-26 NOTE — TELEPHONE ENCOUNTER
I spoke with patient regarding the ? + covid test in office today  Unable to do send out test as is already on day # 5 of illness.   She is agreeable to the paxlovid   Discussed potential side effects  She will cut her amlodipine in half and hold the crestor while on paxlovid.   Hold off on the doxy and augmentin until I call tomorrow wit CXR results  To ED if sx worsen

## 2025-02-26 NOTE — TELEPHONE ENCOUNTER
A. Relayed results to (patient/patient representative as listed on communication consent form) as per provider message. Patient/Patient Representative expressed understanding and did not have any further questions.                                                             Patient had called back in, read results exactly word for word. Patient was  interested in having paxlovid sent over to [facility] pharmacy. Her daughter has plans to  medication around 5pm

## 2025-02-27 ENCOUNTER — RESULTS FOLLOW-UP (OUTPATIENT)
Dept: FAMILY MEDICINE CLINIC | Facility: CLINIC | Age: 75
End: 2025-02-27

## 2025-02-27 NOTE — TELEPHONE ENCOUNTER
I spoke with patient regarding results.   She never got rx for paxlovid. She is now beyond the 5 day ezequiel.   I recommend that she start the oral antibiotics given findings on CXR   Mucinex for cough.   F/u next week.

## 2025-03-05 ENCOUNTER — OFFICE VISIT (OUTPATIENT)
Dept: FAMILY MEDICINE CLINIC | Facility: CLINIC | Age: 75
End: 2025-03-05
Payer: COMMERCIAL

## 2025-03-05 VITALS
HEART RATE: 81 BPM | BODY MASS INDEX: 22.25 KG/M2 | SYSTOLIC BLOOD PRESSURE: 120 MMHG | DIASTOLIC BLOOD PRESSURE: 71 MMHG | WEIGHT: 129.6 LBS | TEMPERATURE: 98.1 F | OXYGEN SATURATION: 98 %

## 2025-03-05 DIAGNOSIS — I10 ESSENTIAL HYPERTENSION: ICD-10-CM

## 2025-03-05 DIAGNOSIS — R63.4 WEIGHT LOSS: ICD-10-CM

## 2025-03-05 DIAGNOSIS — J45.991 COUGH VARIANT ASTHMA: ICD-10-CM

## 2025-03-05 DIAGNOSIS — J18.9 PNEUMONIA OF LEFT LOWER LOBE DUE TO INFECTIOUS ORGANISM: Primary | ICD-10-CM

## 2025-03-05 PROCEDURE — 99214 OFFICE O/P EST MOD 30 MIN: CPT | Performed by: FAMILY MEDICINE

## 2025-03-05 PROCEDURE — G2211 COMPLEX E/M VISIT ADD ON: HCPCS | Performed by: FAMILY MEDICINE

## 2025-03-05 RX ORDER — LEVOFLOXACIN 500 MG/1
500 TABLET, FILM COATED ORAL EVERY 24 HOURS
Qty: 7 TABLET | Refills: 0 | Status: SHIPPED | OUTPATIENT
Start: 2025-03-05 | End: 2025-03-13

## 2025-03-05 RX ORDER — PREDNISONE 20 MG/1
40 TABLET ORAL DAILY
Qty: 10 TABLET | Refills: 0 | Status: SHIPPED | OUTPATIENT
Start: 2025-03-05 | End: 2025-03-10

## 2025-03-05 RX ORDER — AMLODIPINE BESYLATE 5 MG/1
5 TABLET ORAL DAILY
Start: 2025-03-05

## 2025-03-05 NOTE — ASSESSMENT & PLAN NOTE
Bp running low and she has been holding her amlodipine  Will give her decrease dose of amlodipine 5 mg   Plans to just cut hers in half until she runs out.   Orders:    amLODIPine (NORVASC) 5 mg tablet; Take 1 tablet (5 mg total) by mouth daily

## 2025-03-05 NOTE — PROGRESS NOTES
Name: Viv Corona      : 1950      MRN: [id number]  Encounter Provider: Jemma Dailey DO  Encounter Date: 3/5/2025   Encounter department: [facility] PRIMARY CARE    Assessment & Plan  Pneumonia of left lower lobe due to infectious organism  Reviewed CXR results  Completed course of doxy and augmentin and is improved but chest stll feels tight and still with rales left base so will send over levaquin  Add steroid orally and have her f/u with her lung doctor who she sees for her cough variant asthma and sarcoidosis.   I will have her return here in 2 weeks just in case she cannot get in to see pulmonology in timely manner   Discussed that if she feels more weak, short of breath or cough worsens, should go to ED   Orders:    predniSONE 20 mg tablet; Take 2 tablets (40 mg total) by mouth daily for 5 days    levofloxacin (LEVAQUIN) 500 mg tablet; Take 1 tablet (500 mg total) by mouth every 24 hours for 7 days    Essential hypertension  Bp running low and she has been holding her amlodipine  Will give her decrease dose of amlodipine 5 mg   Plans to just cut hers in half until she runs out.   Orders:    amLODIPine (NORVASC) 5 mg tablet; Take 1 tablet (5 mg total) by mouth daily    Weight loss  Patient has lost 18 lbs in the last 2 months. She was taking ozempic 0.5 mg weekly along with metformin xr 1000 mg for her diabetes.   Suspect related to her ozempic but has on this for  years so not sure why all of  a sudden she is losing weight. Was advised at last visit on  to hold this med which she has been doing. Lost another pound.   Colonoscopy is up to date. Was completed 3/1/23. Advised 5 year recall due to history of polyps.   Mammogram up to date.   TSH normal on recent labs.  I had ordered cmp, cbc and tsh at  visit but she has not gone yet. Will get this done and will get CT of chest abdomen and pelvis for further evaluation of her weight loss.   Orders:    CT chest abdomen  pelvis wo contrast; Future         History of Present Illness     HPIPatient is a 74 year old female with hypertension, hyperlipidemia, hypothyroidism,  DM2, depression, alcohol abuse, seizure, cough variant asthma and sarcoidosis who is being seen today for f/u visit.   Seen here on 2/26/25 for complaint of fever, cough and congestion that started 5 days prior to her OV. Onset of sx was abrupt.   Her flu test in office was negative.   Covid test was negative initially but soon after her visit ended the test showed + result. Unclear if this was false +. Did offer paxlovid in event that it was truly covid 19 causing sx.   She was treated with augmentin and CXR ordered. This was completed on 2/27/25 and showed chronic interstitial lung changes as well as subtle airspace disease in left lower lobe to be correlated with clinical concern for pneumonia   Advised to continue the augmentin and keep f/u for this week.   Still has lingering cough and some fatigue. Cough is productive. Clear to yellow sputum.   No fever or chills.   No shortness of breath.   Appetite is diminished.   Thinks maybe has yeast infection from the antibiotic.                           She follows with pulmonology, Chad Rojas MD for her cough variant asthma and sarcoidosis.     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 bland. recall 5 years     Past Surgical History:   Procedure Laterality Date    BREAST BIOPSY Right     benign    CARDIAC CATHETERIZATION      CATARACT EXTRACTION, BILATERAL      COLONOSCOPY      CORNEA LACERATION REPAIR Right     EYE SURGERY  10 years ago    REFRACTIVE SURGERY      TONSILLECTOMY      UPPER GASTROINTESTINAL ENDOSCOPY      WISDOM TOOTH EXTRACTION       Family History   Problem Relation Age of Onset    Heart disease Mother     Heart disease Father     Stroke Sister     Heart disease Sister     Lung cancer Sister 65    Cancer Sister     No Known Problems Daughter     No Known Problems Maternal Grandmother     No Known Problems Maternal Grandfather     No Known Problems Paternal Grandmother     No Known Problems Paternal Grandfather     Heart disease Brother     No Known Problems Maternal Aunt     No Known Problems Maternal Aunt     No Known Problems Maternal Aunt     No Known Problems Maternal Aunt     No Known Problems Paternal Aunt     No Known Problems Paternal Aunt     Colon polyps Neg Hx     Colon cancer Neg Hx      Social History     Tobacco Use    Smoking status: Former     Current packs/day: 0.00     Average packs/day: 0.3 packs/day for 25.2 years (6.3 ttl pk-yrs)     Types: Cigarettes     Start date: 1961     Quit date:      Years since quittin.2     Passive exposure: Past    Smokeless tobacco: Never   Vaping Use    Vaping status: Never Used   Substance and Sexual Activity    Alcohol use: Not Currently     Alcohol/week: 14.0 standard drinks of alcohol     Types: 14 Glasses of wine per week     Comment: Only one or two drinks if out for dinner    Drug use: Yes     Frequency: 3.0 times per week     Types: Marijuana    Sexual activity: Not Currently     Birth control/protection: None     Current Outpatient Medications on File Prior to Visit    Medication Sig    albuterol (PROVENTIL HFA,VENTOLIN HFA) 90 mcg/act inhaler Inhale 2 puffs 4 (four) times a day    amLODIPine (NORVASC) 10 mg tablet Take 1 tablet (10 mg total) by mouth daily    amoxicillin-clavulanate (AUGMENTIN) 875-125 mg per tablet Take 1 tablet by mouth every 12 (twelve) hours for 7 days    ascorbic acid (VITAMIN C) 500 mg tablet Take 500 mg by mouth daily    Biotin 5000 MCG CAPS Take 5,000 mcg by mouth in the morning    budesonide (PULMICORT) 0.5 mg/2 mL nebulizer solution Take 0.5 mg by nebulization as needed in the morning. Rinse mouth after use. .    calcium carbonate (OS-BRITTA) 600 MG tablet Take 600 mg by mouth 2 (two) times a day with meals    cholecalciferol (VITAMIN D3) 1,000 units tablet Take 1,000 Units by mouth daily    dexlansoprazole (DEXILANT) 60 MG capsule Take 1 capsule (60 mg total) by mouth daily    doxycycline hyclate (VIBRA-TABS) 100 mg tablet Take 1 tablet (100 mg total) by mouth 2 (two) times a day for 7 days    ezetimibe (ZETIA) 10 mg tablet Take 1 tablet (10 mg total) by mouth daily    GUAIFENESIN 1200 PO Take 1,500 mg by mouth 2 (two) times a day    hydroCHLOROthiazide 25 mg tablet Take 1 tablet (25 mg total) by mouth daily    ipratropium-albuterol, FOR EMS ONLY, (DUO-NEB) 0.5-2.5 mg/3 mL nebulizer solution Use 3 mL as needed    levothyroxine 88 mcg tablet Take 1 tablet (88 mcg total) by mouth daily    losartan (COZAAR) 100 MG tablet Take 1 tablet (100 mg total) by mouth daily    metFORMIN (GLUCOPHAGE-XR) 500 mg 24 hr tablet Take 2 tablets (1,000 mg total) by mouth daily    potassium chloride (Klor-Con M20) 20 mEq tablet Take 1 tablet (20 mEq total) by mouth daily    Qvar RediHaler 40 MCG/ACT inhaler Inhale 2 puffs 2 (two) times a day    rosuvastatin (CRESTOR) 20 MG tablet Take 1 tablet (20 mg total) by mouth daily    venlafaxine (EFFEXOR-XR) 150 mg 24 hr capsule Take 1 capsule (150 mg total) by mouth every other day    venlafaxine (EFFEXOR-XR) 75 mg 24 hr capsule Take  1 capsule (75 mg total) by mouth every other day     Allergies   Allergen Reactions    Epinephrine Shortness Of Breath and Palpitations    Carisoprodol Other (See Comments)     Other reaction(s): SOMA (11/04/2010)      Irbesartan-Hydrochlorothiazide Other (See Comments)     Possible lichen planus    Lisinopril      Lichen planus    Other Other (See Comments)     Other reaction(s): Muscle Relaxers (09/03/2014)      Tizanidine      Altered mental status     Immunization History   Administered Date(s) Administered    Hep A, adult 03/02/2005    INFLUENZA 02/09/2010, 10/14/2011, 09/28/2016, 10/11/2017    Influenza Split High Dose Preservative Free IM 11/20/2019    Influenza, high dose seasonal 0.7 mL 11/17/2022, 12/04/2023    Pneumococcal Conjugate Vaccine 20-valent (Pcv20), Polysace 05/17/2022    Pneumococcal Polysaccharide PPV23 01/10/2006, 01/01/2014    Td (adult), adsorbed 03/08/2002, 10/13/2003    Tdap 10/06/2010, 07/13/2021     Objective   /71   Pulse 81   Temp 98.1 °F (36.7 °C)   Wt 58.8 kg (129 lb 9.6 oz)   LMP  (LMP Unknown)   SpO2 92%   BMI 22.25 kg/m²     Physical Exam  Vitals and nursing note reviewed.   Constitutional:       General: She is not in acute distress.     Appearance: Normal appearance. She is ill-appearing. She is not toxic-appearing or diaphoretic.   HENT:      Head: Normocephalic and atraumatic.      Right Ear: Tympanic membrane normal.      Left Ear: Tympanic membrane normal.      Nose: Nose normal.      Mouth/Throat:      Mouth: Mucous membranes are moist.      Pharynx: No posterior oropharyngeal erythema.   Eyes:      Conjunctiva/sclera: Conjunctivae normal.   Cardiovascular:      Rate and Rhythm: Normal rate and regular rhythm.      Heart sounds: No murmur heard.  Pulmonary:      Effort: Pulmonary effort is normal. No respiratory distress.      Breath sounds: Wheezing and rales (left base) present.   Musculoskeletal:      Right lower leg: No edema.      Left lower leg: No edema.    Skin:     Findings: No rash.   Neurological:      General: No focal deficit present.      Mental Status: She is alert.   Psychiatric:         Mood and Affect: Mood normal.

## 2025-03-10 ENCOUNTER — RESULTS FOLLOW-UP (OUTPATIENT)
Dept: FAMILY MEDICINE CLINIC | Facility: CLINIC | Age: 75
End: 2025-03-10

## 2025-03-10 NOTE — TELEPHONE ENCOUNTER
Patient calls in from [facility], states that she is unable to make it to today appt, but is aware Dr silvestre needs to see her this week, her son cannot bring her today.     Please advise if Same day can be offered on 3/13/25 as she needs a PM appt.

## 2025-03-12 ENCOUNTER — TELEPHONE (OUTPATIENT)
Dept: ADMINISTRATIVE | Facility: OTHER | Age: 75
End: 2025-03-12

## 2025-03-12 NOTE — TELEPHONE ENCOUNTER
03/12/25 4:10 PM    Patient contacted to bring Advance Directive, POLST, or Living Will document to next scheduled pcp visit.[facility] Department left message.    Thank you.  Beryl Colby MA  [facility]

## 2025-03-12 NOTE — PROGRESS NOTES
Name: Viv Corona      : 1950      MRN: [id number]  Encounter Provider: Jemma Dailey DO  Encounter Date: 3/13/2025   Encounter department: [facility] PRIMARY CARE    Assessment & Plan  Pneumonia due to infectious organism, unspecified laterality, unspecified part of lung  Most recently seen on 3/5 and started on levaquin and prednisone as she had persisting rales in left base as well as wheezing throughout.   Improved.     She was sent to have CT of chest, abdomen and pelvis for her complaint of weight loss  Pulmonary findings as noted below.   She was able to call and have her appt moved up from late April to 25.   I will fax over the CT to his office.   I am hesitant to start a macrolide for what is seen on her CT scan since she has been on so many antibiotics in last month  Will defer to pulmonology.        Weight loss  No etiology found for this on labs done 3/5/25 and CT scan of chest, abdomen and pelvis done 3/12/25.   Both labs and imaging reviewed with patient today  CMP showed glucose of 115 and GFR of 75.   Her CBC was normal with exception of platelet count which was slightly elevated at 433, likely reactive   TSH normal.     I am having her hold her ozempic.   She has gained 6 lbs since last visit one week ago  Will continue to monitor.        Sarcoidosis         Cough variant asthma         Abnormal CT of the chest  CT of chest , abdomen and pelvis done at  [facility] on 3/12/25  Showed bilateral tree in bud opacities that are likely infectious or inflammatory, most prominent in upper lobes  Minimal subpleural scar at both lung bases. Mild peribronchial thickening involving portions of the posterior right upper lobe.   3 mm nodule posterior right upper lobe possibly related to inflammation.   NAKITA infection should be considered.          Type 2 diabetes mellitus without complication, without long-term current use of insulin (HCC)    Lab Results   Component Value Date     HGBA1C 6.7 (H) 12/12/2024   She was on ozempic and metformin when this labwork was done  She has since been advised to d/c the ozempic due to weight loss.   I will have her monitor on metformin alone for now.          Thrush    Orders:    nystatin (MYCOSTATIN) 500,000 units/5 mL suspension; Apply 5 mL (500,000 Units total) to the mouth or throat 4 (four) times a day for 10 days         History of Present Illness     HPIPatient is a 74 year old female with hypertension, hyperlipidemia, hypothyroidism,  DM2, depression, alcohol abuse, seizure, cough variant asthma and sarcoidosis who is being seen today for f/u visit.   Seen here on 2/26/25 for complaint of fever, cough and congestion that started 5 days prior to her OV. Onset of sx was abrupt.   Her flu test in office was negative.   Covid test was negative initially but soon after her visit ended the test showed + result. Unclear if this was false +. Did offer paxlovid in event that it was truly covid 19 causing sx.   She was treated with augmentin and doxycycline and CXR ordered. This was completed on 2/27/25 and showed chronic interstitial lung changes as well as subtle airspace disease in left lower lobe to be correlated with clinical concern for pneumonia   Advised to continue the augmentin and keep f/u for this week.  Seen in f/u on 3/5/25 and was improved but still with rales in left base as well as some wheezes on exam.   She was started on oral steroid and levaquin and advised to f/u with her pulmonologist. Has appt with Dr Rojas on 4/2/25    Is feeling better. Cough improved. Breathing okay. No fevers.   Throat is sore.   Still tired  Still using her inhalers as prescribed      She had also had some significant weight loss over the last few months.   Was advised to hold her ozempic.   Labs including cmp, cbc and tsh done and were all unremarkable.   We reviewed the cmp and cbc today  Her colon cancer screening is up to date.   I had ordered CT scan of  chest, abdomen and pelvis and this was done on 3/12/25. We reviewed rsults today.     Lastly, her bp was running low. Her dose of amlodipine was decreased from 10 mg to 5 mg. Feelig less dizzy with this change.     Still holding her ozempic. Gained 5 lbs. Appetite okay.     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 bland. recall 5 years     Past Surgical History:   Procedure Laterality Date    BREAST BIOPSY Right     benign    CARDIAC CATHETERIZATION      CATARACT EXTRACTION, BILATERAL      COLONOSCOPY      CORNEA LACERATION REPAIR Right     EYE SURGERY  10 years ago    REFRACTIVE SURGERY      TONSILLECTOMY      UPPER GASTROINTESTINAL ENDOSCOPY      WISDOM TOOTH EXTRACTION       Family History   Problem Relation Age of Onset    Heart disease Mother     Heart disease Father     Stroke Sister     Heart disease Sister     Lung cancer Sister 65    Cancer Sister     No Known Problems Daughter     No Known Problems Maternal Grandmother     No Known Problems Maternal Grandfather     No Known Problems Paternal Grandmother     No Known Problems Paternal Grandfather     Heart disease Brother     No Known Problems Maternal Aunt     No Known Problems Maternal Aunt      No Known Problems Maternal Aunt     No Known Problems Maternal Aunt     No Known Problems Paternal Aunt     No Known Problems Paternal Aunt     Colon polyps Neg Hx     Colon cancer Neg Hx      Social History     Tobacco Use    Smoking status: Former     Current packs/day: 0.00     Average packs/day: 0.3 packs/day for 25.2 years (6.3 ttl pk-yrs)     Types: Cigarettes     Start date: 1961     Quit date:      Years since quittin.2     Passive exposure: Past    Smokeless tobacco: Never   Vaping Use    Vaping status: Never Used   Substance and Sexual Activity    Alcohol use: Not Currently     Alcohol/week: 14.0 standard drinks of alcohol     Types: 14 Glasses of wine per week     Comment: Only one or two drinks if out for dinner    Drug use: Yes     Frequency: 3.0 times per week     Types: Marijuana    Sexual activity: Not Currently     Birth control/protection: None     Current Outpatient Medications on File Prior to Visit   Medication Sig    albuterol (PROVENTIL HFA,VENTOLIN HFA) 90 mcg/act inhaler Inhale 2 puffs 4 (four) times a day    amLODIPine (NORVASC) 5 mg tablet Take 1 tablet (5 mg total) by mouth daily    ascorbic acid (VITAMIN C) 500 mg tablet Take 500 mg by mouth daily    Biotin 5000 MCG CAPS Take 5,000 mcg by mouth in the morning    budesonide (PULMICORT) 0.5 mg/2 mL nebulizer solution Take 0.5 mg by nebulization as needed in the morning. Rinse mouth after use. .    calcium carbonate (OS-BRITTA) 600 MG tablet Take 600 mg by mouth 2 (two) times a day with meals    cholecalciferol (VITAMIN D3) 1,000 units tablet Take 1,000 Units by mouth daily    dexlansoprazole (DEXILANT) 60 MG capsule Take 1 capsule (60 mg total) by mouth daily    ezetimibe (ZETIA) 10 mg tablet Take 1 tablet (10 mg total) by mouth daily    GUAIFENESIN 1200 PO Take 1,500 mg by mouth 2 (two) times a day    hydroCHLOROthiazide 25 mg tablet Take 1 tablet (25 mg total) by mouth daily    ipratropium-albuterol, FOR EMS ONLY, (DUO-NEB)  0.5-2.5 mg/3 mL nebulizer solution Use 3 mL as needed    levofloxacin (LEVAQUIN) 500 mg tablet Take 1 tablet (500 mg total) by mouth every 24 hours for 7 days    levothyroxine 88 mcg tablet Take 1 tablet (88 mcg total) by mouth daily    losartan (COZAAR) 100 MG tablet Take 1 tablet (100 mg total) by mouth daily    metFORMIN (GLUCOPHAGE-XR) 500 mg 24 hr tablet Take 2 tablets (1,000 mg total) by mouth daily    potassium chloride (Klor-Con M20) 20 mEq tablet Take 1 tablet (20 mEq total) by mouth daily    Qvar RediHaler 40 MCG/ACT inhaler Inhale 2 puffs 2 (two) times a day    rosuvastatin (CRESTOR) 20 MG tablet Take 1 tablet (20 mg total) by mouth daily    venlafaxine (EFFEXOR-XR) 150 mg 24 hr capsule Take 1 capsule (150 mg total) by mouth every other day    venlafaxine (EFFEXOR-XR) 75 mg 24 hr capsule Take 1 capsule (75 mg total) by mouth every other day     Allergies   Allergen Reactions    Epinephrine Shortness Of Breath and Palpitations    Carisoprodol Other (See Comments)     Other reaction(s): SOMA (11/04/2010)      Irbesartan-Hydrochlorothiazide Other (See Comments)     Possible lichen planus    Lisinopril      Lichen planus    Other Other (See Comments)     Other reaction(s): Muscle Relaxers (09/03/2014)      Tizanidine      Altered mental status     Immunization History   Administered Date(s) Administered    Hep A, adult 03/02/2005    INFLUENZA 02/09/2010, 10/14/2011, 09/28/2016, 10/11/2017    Influenza Split High Dose Preservative Free IM 11/20/2019    Influenza, high dose seasonal 0.7 mL 11/17/2022, 12/04/2023    Pneumococcal Conjugate Vaccine 20-valent (Pcv20), Polysace 05/17/2022    Pneumococcal Polysaccharide PPV23 01/10/2006, 01/01/2014    Td (adult), adsorbed 03/08/2002, 10/13/2003    Tdap 10/06/2010, 07/13/2021     Objective   /79   Pulse 92   Wt 61.4 kg (135 lb 6.4 oz)   LMP  (LMP Unknown)   SpO2 96%   BMI 23.24 kg/m²     Physical Exam  Vitals and nursing note reviewed.   Constitutional:        General: She is not in acute distress.     Appearance: Normal appearance. She is not ill-appearing, toxic-appearing or diaphoretic.   HENT:      Head: Normocephalic and atraumatic.      Mouth/Throat:      Mouth: Mucous membranes are moist.      Pharynx: Posterior oropharyngeal erythema present.      Comments: + white plaque pharynx and back of tongue on right side  Eyes:      Conjunctiva/sclera: Conjunctivae normal.   Cardiovascular:      Rate and Rhythm: Normal rate and regular rhythm.      Heart sounds: No murmur heard.  Pulmonary:      Effort: Pulmonary effort is normal.      Breath sounds: No wheezing, rhonchi or rales.   Abdominal:      General: Abdomen is flat. Bowel sounds are normal.      Palpations: Abdomen is soft.   Musculoskeletal:      Cervical back: Normal range of motion and neck supple.      Right lower leg: No edema.      Left lower leg: No edema.   Lymphadenopathy:      Cervical: No cervical adenopathy.   Neurological:      General: No focal deficit present.      Mental Status: She is alert and oriented to person, place, and time. Mental status is at baseline.   Psychiatric:         Mood and Affect: Mood normal.

## 2025-03-13 ENCOUNTER — OFFICE VISIT (OUTPATIENT)
Dept: FAMILY MEDICINE CLINIC | Facility: CLINIC | Age: 75
End: 2025-03-13
Payer: COMMERCIAL

## 2025-03-13 ENCOUNTER — RESULTS FOLLOW-UP (OUTPATIENT)
Dept: FAMILY MEDICINE CLINIC | Facility: CLINIC | Age: 75
End: 2025-03-13

## 2025-03-13 VITALS
OXYGEN SATURATION: 96 % | WEIGHT: 135.4 LBS | HEART RATE: 92 BPM | BODY MASS INDEX: 23.24 KG/M2 | SYSTOLIC BLOOD PRESSURE: 130 MMHG | DIASTOLIC BLOOD PRESSURE: 79 MMHG

## 2025-03-13 DIAGNOSIS — R93.89 ABNORMAL CT OF THE CHEST: ICD-10-CM

## 2025-03-13 DIAGNOSIS — E11.9 TYPE 2 DIABETES MELLITUS WITHOUT COMPLICATION, WITHOUT LONG-TERM CURRENT USE OF INSULIN (HCC): ICD-10-CM

## 2025-03-13 DIAGNOSIS — R63.4 WEIGHT LOSS: ICD-10-CM

## 2025-03-13 DIAGNOSIS — D86.9 SARCOIDOSIS: ICD-10-CM

## 2025-03-13 DIAGNOSIS — J45.991 COUGH VARIANT ASTHMA: ICD-10-CM

## 2025-03-13 DIAGNOSIS — B37.0 THRUSH: ICD-10-CM

## 2025-03-13 DIAGNOSIS — J18.9 PNEUMONIA DUE TO INFECTIOUS ORGANISM, UNSPECIFIED LATERALITY, UNSPECIFIED PART OF LUNG: Primary | ICD-10-CM

## 2025-03-13 PROCEDURE — 99214 OFFICE O/P EST MOD 30 MIN: CPT | Performed by: FAMILY MEDICINE

## 2025-03-13 RX ORDER — NYSTATIN 100000 [USP'U]/ML
500000 SUSPENSION ORAL 4 TIMES DAILY
Qty: 200 ML | Refills: 0 | Status: SHIPPED | OUTPATIENT
Start: 2025-03-13 | End: 2025-03-23

## 2025-03-13 NOTE — ASSESSMENT & PLAN NOTE
Lab Results   Component Value Date    HGBA1C 6.7 (H) 12/12/2024   She was on ozempic and metformin when this labwork was done  She has since been advised to d/c the ozempic due to weight loss.   I will have her monitor on metformin alone for now.

## 2025-03-13 NOTE — ASSESSMENT & PLAN NOTE
CT of chest , abdomen and pelvis done at  [facility] on 3/12/25  Showed bilateral tree in bud opacities that are likely infectious or inflammatory, most prominent in upper lobes  Minimal subpleural scar at both lung bases. Mild peribronchial thickening involving portions of the posterior right upper lobe.   3 mm nodule posterior right upper lobe possibly related to inflammation.   NAKITA infection should be considered.

## 2025-03-17 ENCOUNTER — RESULTS FOLLOW-UP (OUTPATIENT)
Dept: FAMILY MEDICINE CLINIC | Facility: CLINIC | Age: 75
End: 2025-03-17

## 2025-04-21 ENCOUNTER — OFFICE VISIT (OUTPATIENT)
Dept: GASTROENTEROLOGY | Facility: CLINIC | Age: 75
End: 2025-04-21
Payer: COMMERCIAL

## 2025-04-21 VITALS
DIASTOLIC BLOOD PRESSURE: 86 MMHG | WEIGHT: 132 LBS | SYSTOLIC BLOOD PRESSURE: 160 MMHG | BODY MASS INDEX: 23.39 KG/M2 | HEIGHT: 63 IN

## 2025-04-21 DIAGNOSIS — K64.9 HEMORRHOIDS, UNSPECIFIED HEMORRHOID TYPE: Primary | ICD-10-CM

## 2025-04-21 DIAGNOSIS — K62.5 RECTAL BLEEDING: ICD-10-CM

## 2025-04-21 PROCEDURE — PBNCHG PB NO CHARGE PLACEHOLDER: Performed by: INTERNAL MEDICINE

## 2025-04-21 PROCEDURE — 46221 LIGATION OF HEMORRHOID(S): CPT | Performed by: INTERNAL MEDICINE

## 2025-04-21 NOTE — PROGRESS NOTES
Replaced by [facility] - Hemorrhoid Banding Viv Corona 74 y.o. female MRN: [id number]  Encounter: [id number]    ASSESSMENT AND PLAN:    The right posterior hemorrhoid area was banded 4/21/2025. The patient was instructed to avoid constipation and straining, and educated in appropriate fiber intake.     HPI: Viv Corona is a 74 y.o. year old female who presents with rectal bleeding due to hemorrhoids.     Previous treatments include: LL RA   Bands were previously placed: LL RA   Current Fiber intake: encouraged supplementation  Complications of prior therapy include: none    The patient provided consent for banding  and was placed in the left lateral position  Rectal exam showed skin tag  The [place] ligator was advanced and a band was applied without difficulty   Repeat rectal exam confirmed appropriate placement  The patient was discharged home after observation in the waiting area

## 2025-04-25 ENCOUNTER — TELEPHONE (OUTPATIENT)
Dept: ADMINISTRATIVE | Facility: OTHER | Age: 75
End: 2025-04-25

## 2025-04-25 NOTE — TELEPHONE ENCOUNTER
04/25/25 1:10 PM    Patient contacted to bring Advance Directive, POLST, or Living Will document to next scheduled pcp visit.[facility] Department left message.    Thank you.  Beryl Colby MA  [facility]

## 2025-04-25 NOTE — ASSESSMENT & PLAN NOTE
CT of chest , abdomen and pelvis done at  [facility] on 3/12/25  Showed bilateral tree in bud opacities that are likely infectious or inflammatory, most prominent in upper lobes  Minimal subpleural scar at both lung bases. Mild peribronchial thickening involving portions of the posterior right upper lobe.   3 mm nodule posterior right upper lobe possibly related to inflammation    Was advised f/u with her pulmonologist. She states that she had appt on 4/2/25 but pulmonology canceled due to emergency and rescheduled for mid may

## 2025-04-25 NOTE — PROGRESS NOTES
Name: Viv Corona      : 1950      MRN: [id number]  Encounter Provider: Jemma Dailey DO  Encounter Date: 2025   Encounter department: [facility] PRIMARY CARE    Assessment & Plan  Weight loss  Thankfully her weight has stabilized and she has started gaining again with holding her ozempic.   Has rx to have repeat labs in may followed by her [facility]   Continue to monitor this.        Abnormal CT of the chest  CT of chest , abdomen and pelvis done at  [facility] on 3/12/25  Showed bilateral tree in bud opacities that are likely infectious or inflammatory, most prominent in upper lobes  Minimal subpleural scar at both lung bases. Mild peribronchial thickening involving portions of the posterior right upper lobe.   3 mm nodule posterior right upper lobe possibly related to inflammation    Was advised f/u with her pulmonologist. She states that she had appt on 25 but pulmonology canceled due to emergency and rescheduled for mid may                History of Present Illness     HPI  Patient is a 74 year old female with hypertension, hyperlipidemia, DM2, hypothyroidism, osteopenia, depression, alcohol abuse, seizure disorder, sarcoidosis who is being seen today for f/u on her weight loss    Has had extensive workup for her weight loss. No etiology found for this on labs done 3/5/25 and CT scan of chest, abdomen and pelvis done 3/12/25.   At time of her last visit on 3/13/25, she was advised to hold her ozempic that she was taking for her diabetes. Her weighr at the 3/13/25 OV was 135 lbs.     Since I saw her last, she has been holding her ozempic.   Her appetite is good. No nausea, vomiting or abdominal pain. No diarrhea.   Her weight has stabilized and she has gained 10 lbs.   Is not drinking alcohol      Has not seen pulmonology as recommended. Was scheduled but pulmonologist canceled for emergency and rescheduled it.   Some allergies and not taking anything right now.   She is using her  qvar daily as rx. Has not had to use rescue inhaler.     Will be due for labs to check thyroid, A1c, etc in one month.           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 bland. recall 5 years     Past Surgical History:   Procedure Laterality Date    BREAST BIOPSY Right     benign    CARDIAC CATHETERIZATION      CATARACT EXTRACTION, BILATERAL      COLONOSCOPY      CORNEA LACERATION REPAIR Right     EYE SURGERY  10 years ago    REFRACTIVE SURGERY      TONSILLECTOMY      UPPER GASTROINTESTINAL ENDOSCOPY      WISDOM TOOTH EXTRACTION       Family History   Problem Relation Age of Onset    Heart disease Mother     Heart disease Father     Stroke Sister     Heart disease Sister     Lung cancer Sister 65    Cancer Sister     No Known Problems Daughter     No Known Problems Maternal Grandmother     No Known Problems Maternal Grandfather     No Known Problems Paternal Grandmother     No Known Problems Paternal Grandfather     Heart disease Brother     No Known Problems Maternal Aunt     No Known Problems Maternal Aunt     No Known Problems Maternal Aunt     No Known Problems Maternal Aunt     No Known Problems Paternal Aunt      No Known Problems Paternal Aunt     Colon polyps Neg Hx     Colon cancer Neg Hx      Social History     Tobacco Use    Smoking status: Former     Current packs/day: 0.00     Average packs/day: 0.3 packs/day for 25.2 years (6.3 ttl pk-yrs)     Types: Cigarettes     Start date: 1961     Quit date:      Years since quittin.3     Passive exposure: Past    Smokeless tobacco: Never   Vaping Use    Vaping status: Never Used   Substance and Sexual Activity    Alcohol use: Not Currently     Alcohol/week: 14.0 standard drinks of alcohol     Types: 14 Glasses of wine per week     Comment: Only one or two drinks if out for dinner    Drug use: Yes     Frequency: 3.0 times per week     Types: Marijuana    Sexual activity: Not Currently     Birth control/protection: None     Current Outpatient Medications on File Prior to Visit   Medication Sig    albuterol (PROVENTIL HFA,VENTOLIN HFA) 90 mcg/act inhaler Inhale 2 puffs 4 (four) times a day    amLODIPine (NORVASC) 5 mg tablet Take 1 tablet (5 mg total) by mouth daily    ascorbic acid (VITAMIN C) 500 mg tablet Take 500 mg by mouth daily    Biotin 5000 MCG CAPS Take 5,000 mcg by mouth in the morning    budesonide (PULMICORT) 0.5 mg/2 mL nebulizer solution Take 0.5 mg by nebulization as needed in the morning. Rinse mouth after use. .    calcium carbonate (OS-BRITTA) 600 MG tablet Take 600 mg by mouth 2 (two) times a day with meals    cholecalciferol (VITAMIN D3) 1,000 units tablet Take 1,000 Units by mouth daily    dexlansoprazole (DEXILANT) 60 MG capsule Take 1 capsule (60 mg total) by mouth daily    ezetimibe (ZETIA) 10 mg tablet Take 1 tablet (10 mg total) by mouth daily    GUAIFENESIN 1200 PO Take 1,500 mg by mouth 2 (two) times a day    hydroCHLOROthiazide 25 mg tablet Take 1 tablet (25 mg total) by mouth daily    ipratropium-albuterol, FOR EMS ONLY, (DUO-NEB) 0.5-2.5 mg/3 mL nebulizer solution Use 3 mL as needed    levothyroxine 88 mcg tablet Take 1 tablet (88 mcg  total) by mouth daily    losartan (COZAAR) 100 MG tablet Take 1 tablet (100 mg total) by mouth daily    metFORMIN (GLUCOPHAGE-XR) 500 mg 24 hr tablet Take 2 tablets (1,000 mg total) by mouth daily    potassium chloride (Klor-Con M20) 20 mEq tablet Take 1 tablet (20 mEq total) by mouth daily    Qvar RediHaler 40 MCG/ACT inhaler Inhale 2 puffs 2 (two) times a day    rosuvastatin (CRESTOR) 20 MG tablet Take 1 tablet (20 mg total) by mouth daily    venlafaxine (EFFEXOR-XR) 150 mg 24 hr capsule Take 1 capsule (150 mg total) by mouth every other day    venlafaxine (EFFEXOR-XR) 75 mg 24 hr capsule Take 1 capsule (75 mg total) by mouth every other day     Allergies   Allergen Reactions    Epinephrine Shortness Of Breath and Palpitations    Carisoprodol Other (See Comments)     Other reaction(s): SOMA (11/04/2010)      Irbesartan-Hydrochlorothiazide Other (See Comments)     Possible lichen planus    Lisinopril      Lichen planus    Other Other (See Comments)     Other reaction(s): Muscle Relaxers (09/03/2014)      Tizanidine      Altered mental status     Immunization History   Administered Date(s) Administered    Hep A, adult 03/02/2005    INFLUENZA 02/09/2010, 10/14/2011, 09/28/2016, 10/11/2017    Influenza Split High Dose Preservative Free IM 11/20/2019    Influenza, high dose seasonal 0.7 mL 11/17/2022, 12/04/2023    Pneumococcal Conjugate Vaccine 20-valent (Pcv20), Polysace 05/17/2022    Pneumococcal Polysaccharide PPV23 01/10/2006, 01/01/2014    Td (adult), adsorbed 03/08/2002, 10/13/2003    Tdap 10/06/2010, 07/13/2021     Objective   /66 (BP Location: Left arm, Patient Position: Sitting, Cuff Size: Standard)   Pulse 88   Temp 98.2 °F (36.8 °C) (Tympanic)   Wt 65.7 kg (144 lb 12.8 oz)   LMP  (LMP Unknown)   SpO2 93%   BMI 25.65 kg/m²     Physical Exam

## 2025-04-28 ENCOUNTER — OFFICE VISIT (OUTPATIENT)
Dept: FAMILY MEDICINE CLINIC | Facility: CLINIC | Age: 75
End: 2025-04-28
Payer: COMMERCIAL

## 2025-04-28 VITALS
WEIGHT: 144.8 LBS | SYSTOLIC BLOOD PRESSURE: 138 MMHG | HEART RATE: 88 BPM | DIASTOLIC BLOOD PRESSURE: 66 MMHG | OXYGEN SATURATION: 93 % | BODY MASS INDEX: 25.65 KG/M2 | TEMPERATURE: 98.2 F

## 2025-04-28 DIAGNOSIS — R93.89 ABNORMAL CT OF THE CHEST: ICD-10-CM

## 2025-04-28 DIAGNOSIS — I10 ESSENTIAL HYPERTENSION: ICD-10-CM

## 2025-04-28 DIAGNOSIS — R63.4 WEIGHT LOSS: Primary | ICD-10-CM

## 2025-04-28 DIAGNOSIS — K21.9 GASTROESOPHAGEAL REFLUX DISEASE, UNSPECIFIED WHETHER ESOPHAGITIS PRESENT: ICD-10-CM

## 2025-04-28 PROCEDURE — 99213 OFFICE O/P EST LOW 20 MIN: CPT | Performed by: FAMILY MEDICINE

## 2025-06-06 NOTE — PROGRESS NOTES
Patient is a 74 year old female with hypertension, HLD, GERD, NAFLD, hypothyroidism, DM2, osteopenia, depression, alcohol abuse, seizure disorder, sarcoidosis who is being seen today for annual medicare wellness.     She is up to date on colon cancer screening (3/21/23)  Mammogram is up to date (9/23/24)   Overdue for her dexa.   Due for shingrix.    CT of chest , abdomen and pelvis done at  [facility] on 3/12/25  Showed bilateral tree in bud opacities that are likely infectious or inflammatory, most prominent in upper lobes  Minimal subpleural scar at both lung bases. Mild peribronchial thickening involving portions of the posterior right upper lobe.   3 mm nodule posterior right upper lobe possibly related to inflammation     Was advised f/u with her pulmonologist. At time of last visit, told me that she was scheduled to see pulm mid may. No consult for review

## 2025-06-07 LAB
ALBUMIN SERPL-MCNC: 4.2 G/DL (ref 3.8–4.8)
ALP SERPL-CCNC: 124 IU/L (ref 44–121)
ALT SERPL-CCNC: 26 IU/L (ref 0–32)
AST SERPL-CCNC: 32 IU/L (ref 0–40)
BASOPHILS # BLD AUTO: 0.1 X10E3/UL (ref 0–0.2)
BASOPHILS NFR BLD AUTO: 1 %
BILIRUB SERPL-MCNC: 0.3 MG/DL (ref 0–1.2)
BUN SERPL-MCNC: 18 MG/DL (ref 8–27)
BUN/CREAT SERPL: 22 (ref 12–28)
CALCIUM SERPL-MCNC: 9.7 MG/DL (ref 8.7–10.3)
CHLORIDE SERPL-SCNC: 101 MMOL/L (ref 96–106)
CO2 SERPL-SCNC: 24 MMOL/L (ref 20–29)
CREAT SERPL-MCNC: 0.82 MG/DL (ref 0.57–1)
EGFR: 75 ML/MIN/1.73
EOSINOPHIL # BLD AUTO: 0.3 X10E3/UL (ref 0–0.4)
EOSINOPHIL NFR BLD AUTO: 4 %
ERYTHROCYTE [DISTWIDTH] IN BLOOD BY AUTOMATED COUNT: 11.9 % (ref 11.7–15.4)
GLOBULIN SER-MCNC: 2.7 G/DL (ref 1.5–4.5)
GLUCOSE SERPL-MCNC: 201 MG/DL (ref 70–99)
HCT VFR BLD AUTO: 41.7 % (ref 34–46.6)
HGB BLD-MCNC: 13.2 G/DL (ref 11.1–15.9)
IMM GRANULOCYTES # BLD: 0 X10E3/UL (ref 0–0.1)
IMM GRANULOCYTES NFR BLD: 0 %
LYMPHOCYTES # BLD AUTO: 2.5 X10E3/UL (ref 0.7–3.1)
LYMPHOCYTES NFR BLD AUTO: 39 %
MCH RBC QN AUTO: 31.4 PG (ref 26.6–33)
MCHC RBC AUTO-ENTMCNC: 31.7 G/DL (ref 31.5–35.7)
MCV RBC AUTO: 99 FL (ref 79–97)
MONOCYTES # BLD AUTO: 0.7 X10E3/UL (ref 0.1–0.9)
MONOCYTES NFR BLD AUTO: 11 %
NEUTROPHILS # BLD AUTO: 3 X10E3/UL (ref 1.4–7)
NEUTROPHILS NFR BLD AUTO: 45 %
PLATELET # BLD AUTO: 269 X10E3/UL (ref 150–450)
POTASSIUM SERPL-SCNC: 4.4 MMOL/L (ref 3.5–5.2)
PROT SERPL-MCNC: 6.9 G/DL (ref 6–8.5)
RBC # BLD AUTO: 4.21 X10E6/UL (ref 3.77–5.28)
SODIUM SERPL-SCNC: 139 MMOL/L (ref 134–144)
TSH SERPL DL<=0.005 MIU/L-ACNC: 0.93 UIU/ML (ref 0.45–4.5)
WBC # BLD AUTO: 6.5 X10E3/UL (ref 3.4–10.8)

## 2025-06-09 ENCOUNTER — OFFICE VISIT (OUTPATIENT)
Dept: FAMILY MEDICINE CLINIC | Facility: CLINIC | Age: 75
End: 2025-06-09
Payer: COMMERCIAL

## 2025-06-09 ENCOUNTER — RESULTS FOLLOW-UP (OUTPATIENT)
Dept: FAMILY MEDICINE CLINIC | Facility: CLINIC | Age: 75
End: 2025-06-09

## 2025-06-09 VITALS
HEIGHT: 64 IN | DIASTOLIC BLOOD PRESSURE: 68 MMHG | HEART RATE: 87 BPM | OXYGEN SATURATION: 95 % | BODY MASS INDEX: 25.71 KG/M2 | TEMPERATURE: 97 F | WEIGHT: 150.6 LBS | SYSTOLIC BLOOD PRESSURE: 122 MMHG

## 2025-06-09 DIAGNOSIS — K76.0 NAFLD (NONALCOHOLIC FATTY LIVER DISEASE): ICD-10-CM

## 2025-06-09 DIAGNOSIS — E03.9 HYPOTHYROIDISM, UNSPECIFIED TYPE: ICD-10-CM

## 2025-06-09 DIAGNOSIS — K21.9 GASTROESOPHAGEAL REFLUX DISEASE, UNSPECIFIED WHETHER ESOPHAGITIS PRESENT: ICD-10-CM

## 2025-06-09 DIAGNOSIS — I10 ESSENTIAL HYPERTENSION: ICD-10-CM

## 2025-06-09 DIAGNOSIS — Z00.00 MEDICARE ANNUAL WELLNESS VISIT, SUBSEQUENT: Primary | ICD-10-CM

## 2025-06-09 DIAGNOSIS — M85.80 OSTEOPENIA, UNSPECIFIED LOCATION: ICD-10-CM

## 2025-06-09 DIAGNOSIS — E11.9 TYPE 2 DIABETES MELLITUS WITHOUT COMPLICATION, WITHOUT LONG-TERM CURRENT USE OF INSULIN (HCC): ICD-10-CM

## 2025-06-09 DIAGNOSIS — E78.2 MIXED HYPERLIPIDEMIA: ICD-10-CM

## 2025-06-09 DIAGNOSIS — Z78.0 POST-MENOPAUSAL: ICD-10-CM

## 2025-06-09 PROBLEM — S92.414A CLOSED NONDISPLACED FRACTURE OF PROXIMAL PHALANX OF RIGHT GREAT TOE: Status: RESOLVED | Noted: 2023-07-17 | Resolved: 2025-06-09

## 2025-06-09 PROCEDURE — 99214 OFFICE O/P EST MOD 30 MIN: CPT | Performed by: FAMILY MEDICINE

## 2025-06-09 PROCEDURE — G0439 PPPS, SUBSEQ VISIT: HCPCS | Performed by: FAMILY MEDICINE

## 2025-06-09 NOTE — PROGRESS NOTES
"Name: Viv Corona      : 1950      MRN: [id number]  Encounter Provider: Jemma Dailey DO  Encounter Date: 2025   Encounter department: [facility] PRIMARY CARE  :  Assessment & Plan  Medicare annual wellness visit, subsequent         Post-menopausal    Orders:  •  DXA bone density spine hip and pelvis    Type 2 diabetes mellitus without complication, without long-term current use of insulin (HCC)  A1c in office today was 7.5 up from 6.7 on last labs.   Was previously on ozempic but has been holding due to significant weight loss she experienced on this medication.   Currently on metformin XR 1000 mg daily  Since A1c has gone up, will have her resume the ozempic at lowest (0.25 mg) dose  She does not need rx as she has stock at home   Eye exam up to date    Orders:  •  semaglutide, 0.25 or 0.5 mg/dose, (Ozempic, 0.25 or 0.5 MG/DOSE,) 2 mg/3 mL injection pen; 0.25 mg under the skin every 7 days for 4 doses (28 days), THEN 0.5 mg under the skin every 7 days    Hypothyroidism, unspecified type  Lab Results   Component Value Date    TSH 0.928 2025     Continue same dose of levothyroxine          Osteopenia, unspecified location  Due for dexa and order placed today         Mixed hyperlipidemia  Lab Results   Component Value Date    CHOLESTEROL 228 (H) 2024    CHOLESTEROL 154 2023    CHOLESTEROL 185 2023     Lab Results   Component Value Date    HDL 57 2024    HDL 53 2023    HDL 59 2023     Lab Results   Component Value Date    TRIG 208 (H) 2024    TRIG 174 (H) 2023    TRIG 106 2023     No results found for: \"NONHDLC\"           NAFLD (nonalcoholic fatty liver disease)  Lab Results   Component Value Date    SODIUM 139 2025    K 4.4 2025     2025    CO2 24 2025    BUN 18 2025    CREATININE 0.82 2025    GLUC 201 (H) 2025    AST 32 2025    ALT 26 2025    TP 6.9 2025 "    TBILI 0.3 06/06/2025    EGFR 75 06/06/2025     Liver enzymes were normal on recent labs.             Preventive health issues were discussed with patient, and age appropriate screening tests were ordered as noted in patient's After Visit Summary. Personalized health advice and appropriate referrals for health education or preventive services given if needed, as noted in patient's After Visit Summary.    History of Present Illness     HPI Patient is a 74 year old female with hypertension, HLD, GERD, NAFLD, hypothyroidism, DM2, osteopenia, depression, alcohol abuse, seizure disorder, sarcoidosis who is being seen today for annual medicare wellness.     She is up to date on colon cancer screening (3/21/23)  Mammogram is up to date (9/23/24)   Overdue for her dexa.   Due for shingrix.    CT of chest , abdomen and pelvis done at  [facility] on 3/12/25  Showed bilateral tree in bud opacities that are likely infectious or inflammatory, most prominent in upper lobes  Minimal subpleural scar at both lung bases. Mild peribronchial thickening involving portions of the posterior right upper lobe.   3 mm nodule posterior right upper lobe possibly related to inflammation     Was advised f/u with her pulmonologist. At time of last visit, told me that she was scheduled to see pulm mid may. Today, tells me that visit was rescheduled to last week of June.   Her breathing has been okay. Slight cough that she blames on allergies. Using qvar inhaler but not daily as rx.     Saw eye doctor. Was told everything was normal.     Walking dogs for exercise.     Moods are good on effexor. Alternates 150/75 every other day.     Patient Care Team:  Jemma Dailey DO as PCP - General (Family Medicine)  Jemma Dailey DO as PCP - PCP-[facility] ([facility])  Stefano Yanez MD (Gastroenterology)    Review of Systems  Medical History Reviewed by provider this encounter:  Tobacco  Allergies  Meds  Problems  Med Hx  Surg Hx  Fam Hx  Soc   Hx       Annual Wellness Visit Questionnaire   Viv is here for her Subsequent Wellness visit.     Health Risk Assessment:   Patient rates overall health as good. Patient feels that their physical health rating is slightly worse. Patient is satisfied with their life. Eyesight was rated as much better. Hearing was rated as slightly worse. Patient feels that their emotional and mental health rating is same. Patients states they are never, rarely angry. Patient states they are often unusually tired/fatigued. Pain experienced in the last 7 days has been none. Patient states that she has experienced no weight loss or gain in last 6 months.     Depression Screening:   PHQ-9 Score: 3      Fall Risk Screening:   In the past year, patient has experienced: no history of falling in past year      Urinary Incontinence Screening:   Patient has not leaked urine accidently in the last six months.     Home Safety:  Patient does not have trouble with stairs inside or outside of their home. Patient has working smoke alarms and has working carbon monoxide detector. Home safety hazards include: none.     Nutrition:   Current diet is Regular.     Medications:   Patient is currently taking over-the-counter supplements. OTC medications include: see medication list. Patient is able to manage medications.     Activities of Daily Living (ADLs)/Instrumental Activities of Daily Living (IADLs):   Walk and transfer into and out of bed and chair?: Yes  Dress and groom yourself?: Yes    Bathe or shower yourself?: Yes    Feed yourself? Yes  Do your laundry/housekeeping?: Yes  Manage your money, pay your bills and track your expenses?: Yes  Make your own meals?: Yes    Do your own shopping?: Yes    Previous Hospitalizations:   Any hospitalizations or ED visits within the last 12 months?: No      Advance Care Planning:   Living will: Yes    Durable POA for healthcare: Yes    Advanced directive: Yes      Cognitive Screening:   Provider or  "family/friend/caregiver concerned regarding cognition?: No    Preventive Screenings      Cardiovascular Screening:    General: Screening Not Indicated and History Lipid Disorder      Diabetes Screening:     General: Screening Not Indicated and History Diabetes      Colorectal Cancer Screening:     General: Screening Current      Breast Cancer Screening:     General: Screening Current      Cervical Cancer Screening:    General: Screening Not Indicated      Osteoporosis Screening:    General: Risks and Benefits Discussed    Due for: DXA Axial      Abdominal Aortic Aneurysm (AAA) Screening:        General: Screening Not Indicated      Lung Cancer Screening:     General: Screening Not Indicated    Immunizations:  - Immunizations due: Zoster (Shingrix)    Screening, Brief Intervention, and Referral to Treatment (SBIRT)     Screening  Typical number of drinks in a day: 0  Typical number of drinks in a week: 0  Interpretation: Low risk drinking behavior.    Single Item Drug Screening:  How often have you used an illegal drug (including marijuana) or a prescription medication for non-medical reasons in the past year? less than monthly    Single Item Drug Screen Score: 1  Interpretation: POSITIVE screen for possible drug use disorder    Drug Abuse Screening Test (DAST-10):  1) Have you used drugs other than those required for medical reasons? Yes  2) Do you abuse more than one drug at a time? No  3) Are you always able to stop using drugs when you want to? No  4) Have you had \"blackouts\" or \"flashbacks\" as a result of drug use? No  5) Do you ever feel bad or guilty about your drug use? No  6) Does your spouse (or parents) ever complain about your involvement with drugs? No  7) Have you neglected your family because of your use of drugs? No  8) Have you engaged in illegal activities in order to obtain drugs? No  9) Have you ever experienced withdrawal symptoms (felt sick) when you stopped taking drugs? No  10) Have you had " "medical problems as a result of your drug use (e.g., memory loss, hepatitis, convulsions, bleeding, etc.)? No    DAST-10 Score: 2  Interpretation: Low level problems related to drug abuse    Brief Intervention  Alcohol & drug use screenings were reviewed. No concerns regarding substance use disorder identified.     Social Drivers of Health     Financial Resource Strain: Low Risk  (5/17/2023)    Overall Financial Resource Strain (CARDIA)    • Difficulty of Paying Living Expenses: Not hard at all   Food Insecurity: No Food Insecurity (6/9/2025)    Nursing - Inadequate Food Risk Classification    • Worried About Running Out of Food in the Last Year: Never true    • Ran Out of Food in the Last Year: Never true   Transportation Needs: No Transportation Needs (6/9/2025)    PRAPARE - Transportation    • Lack of Transportation (Medical): No    • Lack of Transportation (Non-Medical): No   Housing Stability: Low Risk  (6/9/2025)    Housing Stability Vital Sign    • Unable to Pay for Housing in the Last Year: No    • Number of Times Moved in the Last Year: 0    • Homeless in the Last Year: No   Utilities: Not At Risk (6/9/2025)    [facility] Utilities    • Threatened with loss of utilities: No     No results found.    Objective   /68   Pulse 87   Temp (!) 97 °F (36.1 °C) (Tympanic)   Ht 5' 3.5\" (1.613 m)   Wt 68.3 kg (150 lb 9.6 oz)   LMP  (LMP Unknown)   SpO2 95%   BMI 26.26 kg/m²     Physical Exam  Constitutional:       General: She is not in acute distress.     Appearance: Normal appearance. She is not ill-appearing, toxic-appearing or diaphoretic.   HENT:      Head: Normocephalic and atraumatic.      Right Ear: Tympanic membrane normal.      Left Ear: Tympanic membrane normal.      Nose: Nose normal.      Mouth/Throat:      Mouth: Mucous membranes are moist.     Eyes:      Extraocular Movements: Extraocular movements intact.      Conjunctiva/sclera: Conjunctivae normal.      Pupils: Pupils are equal, round, and " reactive to light.     Neck:      Vascular: No carotid bruit.     Cardiovascular:      Rate and Rhythm: Normal rate and regular rhythm.      Heart sounds: No murmur heard.  Pulmonary:      Effort: Pulmonary effort is normal.      Breath sounds: Normal breath sounds.   Abdominal:      General: There is no distension.      Palpations: Abdomen is soft. There is no mass.      Tenderness: There is no abdominal tenderness.     Musculoskeletal:      Cervical back: Normal range of motion and neck supple.      Right lower leg: No edema.      Left lower leg: No edema.     Skin:     Findings: No rash.     Neurological:      General: No focal deficit present.      Mental Status: She is alert and oriented to person, place, and time.     Psychiatric:         Mood and Affect: Mood normal.

## 2025-06-09 NOTE — ASSESSMENT & PLAN NOTE
Lab Results   Component Value Date    SODIUM 139 06/06/2025    K 4.4 06/06/2025     06/06/2025    CO2 24 06/06/2025    BUN 18 06/06/2025    CREATININE 0.82 06/06/2025    GLUC 201 (H) 06/06/2025    AST 32 06/06/2025    ALT 26 06/06/2025    TP 6.9 06/06/2025    TBILI 0.3 06/06/2025    EGFR 75 06/06/2025     Liver enzymes were normal on recent labs.

## 2025-06-09 NOTE — ASSESSMENT & PLAN NOTE
"Lab Results   Component Value Date    CHOLESTEROL 228 (H) 12/12/2024    CHOLESTEROL 154 11/28/2023    CHOLESTEROL 185 07/12/2023     Lab Results   Component Value Date    HDL 57 12/12/2024    HDL 53 11/28/2023    HDL 59 07/12/2023     Lab Results   Component Value Date    TRIG 208 (H) 12/12/2024    TRIG 174 (H) 11/28/2023    TRIG 106 07/12/2023     No results found for: \"NONHDLC\"           "

## 2025-06-09 NOTE — ASSESSMENT & PLAN NOTE
A1c in office today was 7.5 up from 6.7 on last labs.   Was previously on ozempic but has been holding due to significant weight loss she experienced on this medication.   Currently on metformin XR 1000 mg daily  Since A1c has gone up, will have her resume the ozempic at lowest (0.25 mg) dose  She does not need rx as she has stock at home   Eye exam up to date    Orders:    semaglutide, 0.25 or 0.5 mg/dose, (Ozempic, 0.25 or 0.5 MG/DOSE,) 2 mg/3 mL injection pen; 0.25 mg under the skin every 7 days for 4 doses (28 days), THEN 0.5 mg under the skin every 7 days

## 2025-06-09 NOTE — PATIENT INSTRUCTIONS
Medicare Preventive Visit Patient Instructions  Thank you for completing your Welcome to Medicare Visit or Medicare Annual Wellness Visit today. Your next wellness visit will be due in one year (6/10/2026).  The screening/preventive services that you may require over the next 5-10 years are detailed below. Some tests may not apply to you based off risk factors and/or age. Screening tests ordered at today's visit but not completed yet may show as past due. Also, please note that scanned in results may not display below.  Preventive Screenings:  Service Recommendations Previous Testing/Comments   Colorectal Cancer Screening  * Colonoscopy    * Fecal Occult Blood Test (FOBT)/Fecal Immunochemical Test (FIT)  * Fecal DNA/Cologuard Test  * Flexible Sigmoidoscopy Age: 45-75 years old   Colonoscopy: every 10 years (may be performed more frequently if at higher risk)  OR  FOBT/FIT: every 1 year  OR  Cologuard: every 3 years  OR  Sigmoidoscopy: every 5 years  Screening may be recommended earlier than age 45 if at higher risk for colorectal cancer. Also, an individualized decision between you and your healthcare provider will decide whether screening between the ages of 76-85 would be appropriate. Colonoscopy: 03/21/2023  FOBT/FIT: Not on file  Cologuard: Not on file  Sigmoidoscopy: Not on file          Breast Cancer Screening Age: 40+ years old  Frequency: every 1-2 years  Not required if history of left and right mastectomy Mammogram: 09/23/2024        Cervical Cancer Screening Between the ages of 21-29, pap smear recommended once every 3 years.   Between the ages of 30-65, can perform pap smear with HPV co-testing every 5 years.   Recommendations may differ for women with a history of total hysterectomy, cervical cancer, or abnormal pap smears in past. Pap Smear: Not on file        Hepatitis C Screening Once for adults born between 1945 and 1965  More frequently in patients at high risk for Hepatitis C Hep C Antibody: Not  on file        Diabetes Screening 1-2 times per year if you're at risk for diabetes or have pre-diabetes Fasting glucose: No results in last 5 years (No results in last 5 years)  A1C: 6.7 % (12/12/2024)      Cholesterol Screening Once every 5 years if you don't have a lipid disorder. May order more often based on risk factors. Lipid panel: 12/12/2024          Other Preventive Screenings Covered by Medicare:  Abdominal Aortic Aneurysm (AAA) Screening: covered once if your at risk. You're considered to be at risk if you have a family history of AAA.  Lung Cancer Screening: covers low dose CT scan once per year if you meet all of the following conditions: (1) Age 55-77; (2) No signs or symptoms of lung cancer; (3) Current smoker or have quit smoking within the last 15 years; (4) You have a tobacco smoking history of at least 20 pack years (packs per day multiplied by number of years you smoked); (5) You get a written order from a healthcare provider.  Glaucoma Screening: covered annually if you're considered high risk: (1) You have diabetes OR (2) Family history of glaucoma OR (3)  aged 50 and older OR (4)  American aged 65 and older  Osteoporosis Screening: covered every 2 years if you meet one of the following conditions: (1) You're estrogen deficient and at risk for osteoporosis based off medical history and other findings; (2) Have a vertebral abnormality; (3) On glucocorticoid therapy for more than 3 months; (4) Have primary hyperparathyroidism; (5) On osteoporosis medications and need to assess response to drug therapy.   Last bone density test (DXA Scan): Not on file.  HIV Screening: covered annually if you're between the age of 15-65. Also covered annually if you are younger than 15 and older than 65 with risk factors for HIV infection. For pregnant patients, it is covered up to 3 times per pregnancy.    Immunizations:  Immunization Recommendations   Influenza Vaccine Annual influenza  vaccination during flu season is recommended for all persons aged >= 6 months who do not have contraindications   Pneumococcal Vaccine   * Pneumococcal conjugate vaccine = PCV13 (Prevnar 13), PCV15 (Vaxneuvance), PCV20 (Prevnar 20)  * Pneumococcal polysaccharide vaccine = PPSV23 (Pneumovax) Adults 19-65 yo with certain risk factors or if 65+ yo  If never received any pneumonia vaccine: recommend Prevnar 20 (PCV20)  Give PCV20 if previously received 1 dose of PCV13 or PPSV23   Hepatitis B Vaccine 3 dose series if at intermediate or high risk (ex: diabetes, end stage renal disease, liver disease)   Respiratory syncytial virus (RSV) Vaccine - COVERED BY MEDICARE PART D  * RSVPreF3 (Arexvy) CDC recommends that adults 60 years of age and older may receive a single dose of RSV vaccine using shared clinical decision-making (SCDM)   Tetanus (Td) Vaccine - COST NOT COVERED BY MEDICARE PART B Following completion of primary series, a booster dose should be given every 10 years to maintain immunity against tetanus. Td may also be given as tetanus wound prophylaxis.   Tdap Vaccine - COST NOT COVERED BY MEDICARE PART B Recommended at least once for all adults. For pregnant patients, recommended with each pregnancy.   Shingles Vaccine (Shingrix) - COST NOT COVERED BY MEDICARE PART B  2 shot series recommended in those 19 years and older who have or will have weakened immune systems or those 50 years and older     Health Maintenance Due:      Topic Date Due   • Hepatitis C Screening  Never done   • Breast Cancer Screening: Mammogram  09/23/2025   • Colorectal Cancer Screening  03/19/2028     Immunizations Due:      Topic Date Due   • COVID-19 Vaccine (1 - 2024-25 season) Never done   • Influenza Vaccine (Season Ended) 09/01/2025     Advance Directives   What are advance directives?  Advance directives are legal documents that state your wishes and plans for medical care. These plans are made ahead of time in case you lose your  ability to make decisions for yourself. Advance directives can apply to any medical decision, such as the treatments you want, and if you want to donate organs.   What are the types of advance directives?  There are many types of advance directives, and each state has rules about how to use them. You may choose a combination of any of the following:  Living will:  This is a written record of the treatment you want. You can also choose which treatments you do not want, which to limit, and which to stop at a certain time. This includes surgery, medicine, IV fluid, and tube feedings.   Durable power of  for healthcare (DPAHC):  This is a written record that states who you want to make healthcare choices for you when you are unable to make them for yourself. This person, called a proxy, is usually a family member or a friend. You may choose more than 1 proxy.  Do not resuscitate (DNR) order:  A DNR order is used in case your heart stops beating or you stop breathing. It is a request not to have certain forms of treatment, such as CPR. A DNR order may be included in other types of advance directives.  Medical directive:  This covers the care that you want if you are in a coma, near death, or unable to make decisions for yourself. You can list the treatments you want for each condition. Treatment may include pain medicine, surgery, blood transfusions, dialysis, IV or tube feedings, and a ventilator (breathing machine).  Values history:  This document has questions about your views, beliefs, and how you feel and think about life. This information can help others choose the care that you would choose.  Why are advance directives important?  An advance directive helps you control your care. Although spoken wishes may be used, it is better to have your wishes written down. Spoken wishes can be misunderstood, or not followed. Treatments may be given even if you do not want them. An advance directive may make it easier  for your family to make difficult choices about your care.   Weight Management   Why it is important to manage your weight:  Being overweight increases your risk of health conditions such as heart disease, high blood pressure, type 2 diabetes, and certain types of cancer. It can also increase your risk for osteoarthritis, sleep apnea, and other respiratory problems. Aim for a slow, steady weight loss. Even a small amount of weight loss can lower your risk of health problems.  How to lose weight safely:  A safe and healthy way to lose weight is to eat fewer calories and get regular exercise. You can lose up about 1 pound a week by decreasing the number of calories you eat by 500 calories each day.   Healthy meal plan for weight management:  A healthy meal plan includes a variety of foods, contains fewer calories, and helps you stay healthy. A healthy meal plan includes the following:  Eat whole-grain foods more often.  A healthy meal plan should contain fiber. Fiber is the part of grains, fruits, and vegetables that is not broken down by your body. Whole-grain foods are healthy and provide extra fiber in your diet. Some examples of whole-grain foods are whole-wheat breads and pastas, oatmeal, brown rice, and bulgur.  Eat a variety of vegetables every day.  Include dark, leafy greens such as spinach, kale, girish greens, and mustard greens. Eat yellow and orange vegetables such as carrots, sweet potatoes, and winter squash.   Eat a variety of fruits every day.  Choose fresh or canned fruit (canned in its own juice or light syrup) instead of juice. Fruit juice has very little or no fiber.  Eat low-fat dairy foods.  Drink fat-free (skim) milk or 1% milk. Eat fat-free yogurt and low-fat cottage cheese. Try low-fat cheeses such as mozzarella and other reduced-fat cheeses.  Choose meat and other protein foods that are low in fat.  Choose beans or other legumes such as split peas or lentils. Choose fish, skinless poultry  (chicken or turkey), or lean cuts of red meat (beef or pork). Before you cook meat or poultry, cut off any visible fat.   Use less fat and oil.  Try baking foods instead of frying them. Add less fat, such as margarine, sour cream, regular salad dressing and mayonnaise to foods. Eat fewer high-fat foods. Some examples of high-fat foods include french fries, doughnuts, ice cream, and cakes.  Eat fewer sweets.  Limit foods and drinks that are high in sugar. This includes candy, cookies, regular soda, and sweetened drinks.  Exercise:  Exercise at least 30 minutes per day on most days of the week. Some examples of exercise include walking, biking, dancing, and swimming. You can also fit in more physical activity by taking the stairs instead of the elevator or parking farther away from stores. Ask your healthcare provider about the best exercise plan for you.    © Copyright [employer] 2018 Information is for End User's use only and may not be sold, redistributed or otherwise used for commercial purposes. All illustrations and images included in CareNotes® are the copyrighted property of [employer] Inc[employer] or [employer]

## 2025-06-09 NOTE — ASSESSMENT & PLAN NOTE
Lab Results   Component Value Date    TSH 0.928 06/06/2025     Continue same dose of levothyroxine

## 2025-06-20 ENCOUNTER — TELEPHONE (OUTPATIENT)
Age: 75
End: 2025-06-20

## 2025-06-20 NOTE — TELEPHONE ENCOUNTER
Patient is looking for orthopedic recommendations. She states her right hip started hurting the beginning of this week and she is in a lot of pain.     Please advise.

## 2025-06-24 ENCOUNTER — DOCUMENTATION (OUTPATIENT)
Dept: FAMILY MEDICINE CLINIC | Facility: CLINIC | Age: 75
End: 2025-06-24

## 2025-06-24 ENCOUNTER — NURSE TRIAGE (OUTPATIENT)
Dept: OTHER | Facility: OTHER | Age: 75
End: 2025-06-24

## 2025-06-24 ENCOUNTER — OFFICE VISIT (OUTPATIENT)
Dept: FAMILY MEDICINE CLINIC | Facility: CLINIC | Age: 75
End: 2025-06-24
Payer: COMMERCIAL

## 2025-06-24 VITALS
TEMPERATURE: 97.3 F | DIASTOLIC BLOOD PRESSURE: 58 MMHG | SYSTOLIC BLOOD PRESSURE: 116 MMHG | WEIGHT: 146.2 LBS | HEART RATE: 83 BPM | OXYGEN SATURATION: 97 % | BODY MASS INDEX: 25.49 KG/M2

## 2025-06-24 DIAGNOSIS — M54.16 ACUTE RADICULAR LOW BACK PAIN: ICD-10-CM

## 2025-06-24 DIAGNOSIS — E87.6 HYPOKALEMIA: ICD-10-CM

## 2025-06-24 DIAGNOSIS — M54.16 ACUTE RADICULAR LOW BACK PAIN: Primary | ICD-10-CM

## 2025-06-24 DIAGNOSIS — M25.551 ACUTE RIGHT HIP PAIN: ICD-10-CM

## 2025-06-24 PROCEDURE — 99214 OFFICE O/P EST MOD 30 MIN: CPT | Performed by: FAMILY MEDICINE

## 2025-06-24 RX ORDER — METHYLPREDNISOLONE 4 MG/1
TABLET ORAL
Qty: 21 EACH | Refills: 0 | Status: SHIPPED | OUTPATIENT
Start: 2025-06-24

## 2025-06-24 RX ORDER — METHYLPREDNISOLONE 4 MG/1
TABLET ORAL
Qty: 21 EACH | Refills: 0 | Status: SHIPPED | OUTPATIENT
Start: 2025-06-24 | End: 2025-06-24 | Stop reason: SDUPTHER

## 2025-06-24 RX ORDER — POTASSIUM CHLORIDE 1500 MG/1
20 TABLET, EXTENDED RELEASE ORAL DAILY
Qty: 90 TABLET | Refills: 3 | Status: SHIPPED | OUTPATIENT
Start: 2025-06-24

## 2025-06-24 NOTE — PROGRESS NOTES
Name: Viv Corona      : 1950      MRN: [id number]  Encounter Provider: Jemma Dailey DO  Encounter Date: 2025   Encounter department: [facility] PRIMARY CARE    Assessment & Plan  Acute radicular low back pain  Complains of right hip pain that radiates down her leg.   Suspect that this pain is coming from back  Sent over rx for medrol dose pack.   Advised she avoid advil while on this med  Refer PT and check xray lumbar spine.   Orders:    methylPREDNISolone 4 MG tablet therapy pack; Use as directed on package    XR spine lumbar minimum 4 views non injury; Future    Ambulatory Referral to Physical Therapy; Future    Acute right hip pain  Pain in right hip radiating down leg which I suspect is more radicular from her back than from hip itself.   Check xray of right hip   Refer PT    Orders:    XR hip/pelv 2-3 vws right if performed; Future    Ambulatory Referral to Physical Therapy; Future    Hypokalemia  Requests refill on her potassium supplement.   Orders:    potassium chloride (Klor-Con M20) 20 mEq tablet; Take 1 tablet (20 mEq total) by mouth daily         History of Present Illness     HPI  Patient is a 74 year old female with hypertension, hyperlipidemia, GERD, fatty liver, hypothyroidism, DM2, depression, history of alcohol abuse, sarcoidosis, cough variant asthma who is being seen today for complaint of right sided hip pain. Points to right side (around waist) as location of her pain.   Pain radiates into right leg. Hurts to bear weight. No associated numbness or tingling. No weakness.     Pain started 1 weeks ago  No injury that she recalls  Pain is made worse by standing for prolonged time period.     Review of Systems  Past Medical History[1]  Past Surgical History[2]  Family History[3]  Social History[4]  Medications[5]  Allergies   Allergen Reactions    Epinephrine Shortness Of Breath and Palpitations    Carisoprodol Other (See Comments)     Other reaction(s): SOMA  (11/04/2010)      Irbesartan-Hydrochlorothiazide Other (See Comments)     Possible lichen planus    Lisinopril      Lichen planus    Other Other (See Comments)     Other reaction(s): Muscle Relaxers (09/03/2014)      Tizanidine      Altered mental status     Immunization History   Administered Date(s) Administered    Hep A, adult 03/02/2005    INFLUENZA 02/09/2010, 10/14/2011, 09/28/2016, 10/11/2017    Influenza Split High Dose Preservative Free IM 11/20/2019    Influenza, high dose seasonal 0.7 mL 11/17/2022, 12/04/2023    Pneumococcal Conjugate Vaccine 20-valent (Pcv20), Polysace 05/17/2022    Pneumococcal Polysaccharide PPV23 01/10/2006, 01/01/2014    Td (adult), adsorbed 03/08/2002, 10/13/2003    Tdap 10/06/2010, 07/13/2021     Objective   /58 (BP Location: Right arm, Patient Position: Sitting, Cuff Size: Standard)   Pulse 83   Temp (!) 97.3 °F (36.3 °C) (Tympanic)   Wt 66.3 kg (146 lb 3.2 oz)   LMP  (LMP Unknown)   SpO2 97%   BMI 25.49 kg/m²     Physical Exam  Vitals and nursing note reviewed.   Constitutional:       General: She is not in acute distress.     Appearance: Normal appearance. She is not ill-appearing, toxic-appearing or diaphoretic.      Comments: Antalgic gait     Cardiovascular:      Rate and Rhythm: Normal rate and regular rhythm.      Heart sounds: No murmur heard.  Pulmonary:      Effort: Pulmonary effort is normal.      Breath sounds: Normal breath sounds.     Musculoskeletal:      Right lower leg: No edema.      Left lower leg: No edema.        Legs:       Comments: Points to right iliac crest at waistline as where she is feeling pain.   Has minimal tenderness in that location.   She has tenderness on palpation of paravertebral muscles of lumbar spine on right.     Full range of motion, both active and passive of right hip.   Negative SLR.   Full flexion of lumbar spine. Decreased extensioin     Neurological:      General: No focal deficit present.      Mental Status: She is  alert and oriented to person, place, and time.      Motor: No weakness.      Gait: Gait abnormal.      Deep Tendon Reflexes: Reflexes normal.                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 bland. recall 5 years   [2]   Past Surgical History:  Procedure Laterality Date    BREAST BIOPSY Right     benign    CARDIAC CATHETERIZATION      CATARACT EXTRACTION, BILATERAL      COLONOSCOPY      CORNEA LACERATION REPAIR Right     EYE SURGERY  10 years ago    REFRACTIVE SURGERY      TONSILLECTOMY      UPPER GASTROINTESTINAL ENDOSCOPY      WISDOM TOOTH EXTRACTION     [3]   Family History  Problem Relation Name Age of Onset    Heart disease Mother Viv Curry     Heart disease Father Srikanth Curry Sr     Stroke Sister Rima Johnlashanda     Heart disease Sister Rima Rodriguezbenlashanda     Lung cancer Sister Rima Rodrigueznatalia 65    Cancer Sister Rima Rodrigueznatalia     No Known Problems Daughter      No Known Problems Maternal Grandmother      No Known Problems Maternal Grandfather      No Known Problems Paternal Grandmother      No Known Problems Paternal  Grandfather      Heart disease Brother Srikanth Curry Jr     No Known Problems Maternal Aunt      No Known Problems Maternal Aunt      No Known Problems Maternal Aunt      No Known Problems Maternal Aunt      No Known Problems Paternal Aunt      No Known Problems Paternal Aunt      Colon polyps Neg Hx      Colon cancer Neg Hx     [4]   Social History  Tobacco Use    Smoking status: Former     Current packs/day: 0.00     Average packs/day: 0.3 packs/day for 25.2 years (6.3 ttl pk-yrs)     Types: Cigarettes     Start date: 1961     Quit date:      Years since quittin.5     Passive exposure: Past    Smokeless tobacco: Never   Vaping Use    Vaping status: Never Used   Substance and Sexual Activity    Alcohol use: Not Currently     Alcohol/week: 14.0 standard drinks of alcohol     Types: 14 Glasses of wine per week     Comment: Only one or two drinks if out for dinner    Drug use: Yes     Frequency: 3.0 times per week     Types: Marijuana     Comment: occ    Sexual activity: Not Currently     Birth control/protection: None   [5]   Current Outpatient Medications on File Prior to Visit   Medication Sig    albuterol (PROVENTIL HFA,VENTOLIN HFA) 90 mcg/act inhaler Inhale 2 puffs in the morning and 2 puffs at noon and 2 puffs in the evening and 2 puffs before bedtime.    amLODIPine (NORVASC) 5 mg tablet Take 1 tablet (5 mg total) by mouth daily    ascorbic acid (VITAMIN C) 500 mg tablet Take 500 mg by mouth in the morning.    Biotin 5000 MCG CAPS Take 5,000 mcg by mouth in the morning    budesonide (PULMICORT) 0.5 mg/2 mL nebulizer solution Take 0.5 mg by nebulization daily as needed Rinse mouth after use.    calcium carbonate (OS-BRITTA) 600 MG tablet Take 600 mg by mouth in the morning and 600 mg in the evening. Take with meals.    cholecalciferol (VITAMIN D3) 1,000 units tablet Take 1,000 Units by mouth in the morning.    dexlansoprazole (DEXILANT) 60 MG capsule Take 1 capsule (60 mg total) by mouth daily     GUAIFENESIN 1200 PO Take 1,500 mg by mouth in the morning and 1,500 mg in the evening.    hydroCHLOROthiazide 25 mg tablet Take 1 tablet (25 mg total) by mouth daily    ipratropium-albuterol, FOR EMS ONLY, (DUO-NEB) 0.5-2.5 mg/3 mL nebulizer solution Use 3 mL as needed    levothyroxine 88 mcg tablet Take 1 tablet (88 mcg total) by mouth daily    losartan (COZAAR) 100 MG tablet Take 1 tablet (100 mg total) by mouth daily    metFORMIN (GLUCOPHAGE-XR) 500 mg 24 hr tablet Take 2 tablets (1,000 mg total) by mouth daily    Qvar RediHaler 40 MCG/ACT inhaler Inhale 2 puffs in the morning and 2 puffs before bedtime.    rosuvastatin (CRESTOR) 20 MG tablet Take 1 tablet (20 mg total) by mouth daily    semaglutide, 0.25 or 0.5 mg/dose, (Ozempic, 0.25 or 0.5 MG/DOSE,) 2 mg/3 mL injection pen 0.25 mg under the skin every 7 days for 4 doses (28 days), THEN 0.5 mg under the skin every 7 days    venlafaxine (EFFEXOR-XR) 150 mg 24 hr capsule Take 1 capsule (150 mg total) by mouth every other day    venlafaxine (EFFEXOR-XR) 75 mg 24 hr capsule Take 1 capsule (75 mg total) by mouth every other day    [DISCONTINUED] potassium chloride (Klor-Con M20) 20 mEq tablet Take 1 tablet (20 mEq total) by mouth daily

## 2025-06-24 NOTE — TELEPHONE ENCOUNTER
"Regarding: medication issue  ----- Message from Katy SALMERON sent at 6/24/2025  6:28 PM EDT -----  \"I was prescribed pain medication today and one was supposed to be sent to [facility] and it was not.\"    "

## 2025-06-24 NOTE — TELEPHONE ENCOUNTER
"REASON FOR CONVERSATION: Medication Refill    SYMPTOMS: Back/hip pain     OTHER HEALTH INFORMATION: recent office visit today     PROTOCOL DISPOSITION: Call PCP Now    CARE ADVICE PROVIDED:     PRACTICE FOLLOW-UP: Pt scripts has now been sent to the local pharmacy as she requested.      Reason for Disposition   [1] Prescription not at pharmacy AND [2] was prescribed by PCP recently  (Exception: [facility] has access to EMR and prescription is recorded there. Go to Home Care and confirm for pharmacy.)    Answer Assessment - Initial Assessment Questions  1. DRUG NAME: \"What medicine do you need to have refilled?\"      Prednisone   2. REFILLS REMAINING: \"How many refills are remaining?\" (Note: The label on the medicine or pill bottle will show how many refills are remaining. If there are no refills remaining, then a renewal may be needed.)      Was sent mail service instead of local [facility]  3. EXPIRATION DATE: \"What is the expiration date?\" (Note: The label states when the prescription will , and thus can no longer be refilled.)        4. PRESCRIBING HCP: \"Who prescribed it?\" Reason: If prescribed by specialist, call should be referred to that group.      Dr Dailey   5. SYMPTOMS: \"Do you have any symptoms?\"      For back and hip pain.    Protocols used: Medication Refill and Renewal Call-Adult-    "

## 2025-06-27 ENCOUNTER — TELEPHONE (OUTPATIENT)
Age: 75
End: 2025-06-27
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
## 2025-06-27 DIAGNOSIS — M25.562 ACUTE PAIN OF LEFT KNEE: ICD-10-CM

## 2025-06-27 NOTE — TELEPHONE ENCOUNTER
Patient contacted-xray faxed for knee. Patient reports that they received xray order, patient will get copy in case we dont receive. Patient aware to report to Urgent Care or ER if pain gets worse over the weekend.

## 2025-06-27 NOTE — TELEPHONE ENCOUNTER
Called pt, reports that she had XR imaging ordered on 6/24 completed at [facility]. Will send record release. Pt noted that she fell last night on left knee and is in a lot of pain. Reports that she is trying to avoid going to the hospital and is requesting an XR to be ordered.

## 2025-06-27 NOTE — TELEPHONE ENCOUNTER
Spoke to Ruby patient called to give fax number again for where the X-ray should be faxed to. Spoke to Ruby gave her the fax number and she is going to be sending it over right now and she will call patient back to confirm that it was faxed to [phone]

## 2025-06-27 NOTE — TELEPHONE ENCOUNTER
Brenda ([facility]) called to make sure the xray result fax came through to the office. Patient's left knee xray showed a fracture.     Brenda can be reached at: [phone]

## 2025-06-27 NOTE — TELEPHONE ENCOUNTER
Sera called and would like to speak with a nurse in the office. She said it is in regards to the results of an Xray and also would like to have a script for an Xray for her right knee. She is in a lot of pain. She explained that she fell and she messed something up. She would like for someone in the office to call her.

## 2025-06-30 NOTE — TELEPHONE ENCOUNTER
Pt wanted a MA to call her.  It's about the hip xray and also was going to make an [facility] ER follow up but soonest appt next week.  She said that was too far away.    Can someone call her back. Thanks.

## 2025-06-30 NOTE — TELEPHONE ENCOUNTER
Jemma Dailey,       6/29/25  7:24 PM  Result Note  Patient with comminuted displaced fracture of left knee on xray  Seen in ED for this and was put in knee brace. Has f/u with ortho (Dr Otto)      Call placed to patient, patient aware of results. Patient is scheduled for Ortho with [facility] Patient would like to know what is going on with her Hip--xray record requested from [place].

## 2025-06-30 NOTE — TELEPHONE ENCOUNTER
Jemma Dailey,       6/29/25  7:24 PM  Result Note  Patient with comminuted displaced fracture of left knee on xray  Seen in ED for this and was put in knee brace. Has f/u with ortho (Dr Otto)

## 2025-07-01 LAB
CHOLEST SERPL-MCNC: 189 MG/DL (ref 100–199)
HDLC SERPL-MCNC: 55 MG/DL
LDLC SERPL CALC-MCNC: 109 MG/DL (ref 0–99)
LDLC/HDLC SERPL: 2 RATIO (ref 0–3.2)
SL AMB VLDL CHOLESTEROL CALC: 25 MG/DL (ref 5–40)
TRIGL SERPL-MCNC: 145 MG/DL (ref 0–149)

## 2025-07-02 ENCOUNTER — RESULTS FOLLOW-UP (OUTPATIENT)
Dept: FAMILY MEDICINE CLINIC | Facility: CLINIC | Age: 75
End: 2025-07-02

## 2025-07-02 DIAGNOSIS — M54.16 ACUTE RADICULAR LOW BACK PAIN: Primary | ICD-10-CM

## 2025-07-02 DIAGNOSIS — M25.551 ACUTE RIGHT HIP PAIN: ICD-10-CM

## 2025-07-02 NOTE — TELEPHONE ENCOUNTER
Relayed Dr. Dailey's message. Patient is asking if  Recommends therapy.  Please call to advise.    Thank you!!

## 2025-07-03 NOTE — TELEPHONE ENCOUNTER
Called and left  for pt notifying her of provider message that PT would be helpful. Referral placed on 6/24.    ----- Message from Jemma Dailey DO sent at 7/2/2025  5:02 PM EDT -----  I think that would be helpful.   ----- Message -----  From: Delmi Barreto  Sent: 7/2/2025   4:59 PM EDT  To: Jemma Dailey DO      ----- Message -----  From: Jemma Dailey DO  Sent: 7/2/2025   3:13 PM EDT  To: [facility]    Can let patient know that the xray of her hip was normal.   ----- Message -----  From: Ruby Blood MA  Sent: 7/2/2025  10:51 AM EDT  To: Jemma Dailye DO    Records received , in bin for review--please advise to contact patient with both results  ----- Message -----  From: Ruby Blood MA  Sent: 7/2/2025  10:27 AM EDT  To: [facility]    ----- Message from Ruby Blood MA sent at 7/2/2025 10:27 AM EDT -----      ----- Message -----  From: Jemma Dailey DO  Sent: 7/2/2025  10:08 AM EDT  To: [facility]    Let patient know that xray of lumbar spine showed arthritis but nothing broken.   Have not received report of hip xray yet  ----- Message -----  From: Emir Capps  Sent: 7/2/2025   9:54 AM EDT  To: Jemma Dailey DO

## 2025-07-07 NOTE — TELEPHONE ENCOUNTER
Called and spoke to pt. Reports while she wants to start PT because her hip is causing her a lot of pain, its difficult to leave the house at this time due to her broken knee. Pt reports she lives with family and has support to keep up with [facility].

## 2025-07-22 DIAGNOSIS — E11.9 TYPE 2 DIABETES MELLITUS WITHOUT COMPLICATION, WITHOUT LONG-TERM CURRENT USE OF INSULIN (HCC): ICD-10-CM

## 2025-07-24 RX ORDER — METFORMIN HYDROCHLORIDE 500 MG/1
1000 TABLET, EXTENDED RELEASE ORAL DAILY
Qty: 60 TABLET | Refills: 0 | Status: SHIPPED | OUTPATIENT
Start: 2025-07-24